# Patient Record
Sex: FEMALE | Race: WHITE | Employment: OTHER | URBAN - METROPOLITAN AREA
[De-identification: names, ages, dates, MRNs, and addresses within clinical notes are randomized per-mention and may not be internally consistent; named-entity substitution may affect disease eponyms.]

---

## 2017-05-06 PROBLEM — F33.2 SEVERE EPISODE OF RECURRENT MAJOR DEPRESSIVE DISORDER (HCC): Status: ACTIVE | Noted: 2017-05-06

## 2017-11-17 PROBLEM — K08.89 PAIN, DENTAL: Status: ACTIVE | Noted: 2017-11-17

## 2017-11-17 PROBLEM — R52 GENERALIZED PAIN: Status: ACTIVE | Noted: 2017-11-17

## 2018-01-04 PROBLEM — R73.09 ELEVATED GLUCOSE: Status: ACTIVE | Noted: 2018-01-04

## 2018-01-23 PROBLEM — E78.2 MIXED HYPERLIPIDEMIA: Status: ACTIVE | Noted: 2018-01-23

## 2018-03-15 ENCOUNTER — TELEPHONE (OUTPATIENT)
Dept: FAMILY MEDICINE CLINIC | Age: 38
End: 2018-03-15

## 2018-03-15 NOTE — TELEPHONE ENCOUNTER
Pt states she would like to go to the hospital to medically detox. She does not wish to go to a treatment center.

## 2018-05-02 ENCOUNTER — OFFICE VISIT (OUTPATIENT)
Dept: FAMILY MEDICINE CLINIC | Age: 38
End: 2018-05-02
Payer: COMMERCIAL

## 2018-05-02 VITALS
TEMPERATURE: 98.5 F | DIASTOLIC BLOOD PRESSURE: 86 MMHG | BODY MASS INDEX: 44.41 KG/M2 | WEIGHT: 293 LBS | SYSTOLIC BLOOD PRESSURE: 120 MMHG | OXYGEN SATURATION: 97 % | HEIGHT: 68 IN | HEART RATE: 107 BPM

## 2018-05-02 DIAGNOSIS — Z72.0 TOBACCO ABUSE: ICD-10-CM

## 2018-05-02 DIAGNOSIS — E66.01 MORBID OBESITY WITH BMI OF 40.0-44.9, ADULT (HCC): ICD-10-CM

## 2018-05-02 DIAGNOSIS — F10.10 ALCOHOL ABUSE: ICD-10-CM

## 2018-05-02 DIAGNOSIS — I49.3 PVC (PREMATURE VENTRICULAR CONTRACTION): ICD-10-CM

## 2018-05-02 DIAGNOSIS — R00.0 SINUS TACHYCARDIA: Primary | ICD-10-CM

## 2018-05-02 PROCEDURE — G8417 CALC BMI ABV UP PARAM F/U: HCPCS | Performed by: FAMILY MEDICINE

## 2018-05-02 PROCEDURE — 4004F PT TOBACCO SCREEN RCVD TLK: CPT | Performed by: FAMILY MEDICINE

## 2018-05-02 PROCEDURE — 99213 OFFICE O/P EST LOW 20 MIN: CPT | Performed by: FAMILY MEDICINE

## 2018-05-02 PROCEDURE — G8427 DOCREV CUR MEDS BY ELIG CLIN: HCPCS | Performed by: FAMILY MEDICINE

## 2018-05-02 RX ORDER — METOPROLOL TARTRATE 50 MG/1
TABLET, FILM COATED ORAL
Qty: 60 TABLET | Refills: 5 | Status: SHIPPED | OUTPATIENT
Start: 2018-05-02 | End: 2018-06-25 | Stop reason: SDUPTHER

## 2018-06-25 DIAGNOSIS — R00.0 SINUS TACHYCARDIA: ICD-10-CM

## 2018-06-25 DIAGNOSIS — I49.3 PVC (PREMATURE VENTRICULAR CONTRACTION): ICD-10-CM

## 2018-06-25 RX ORDER — METOPROLOL TARTRATE 50 MG/1
TABLET, FILM COATED ORAL
Qty: 60 TABLET | Refills: 0 | Status: SHIPPED | OUTPATIENT
Start: 2018-06-25 | End: 2018-07-21 | Stop reason: SDUPTHER

## 2018-06-28 ENCOUNTER — TELEPHONE (OUTPATIENT)
Dept: FAMILY MEDICINE CLINIC | Age: 38
End: 2018-06-28

## 2018-07-21 DIAGNOSIS — R00.0 SINUS TACHYCARDIA: ICD-10-CM

## 2018-07-21 DIAGNOSIS — I49.3 PVC (PREMATURE VENTRICULAR CONTRACTION): ICD-10-CM

## 2018-07-23 RX ORDER — METOPROLOL TARTRATE 50 MG/1
TABLET, FILM COATED ORAL
Qty: 60 TABLET | Refills: 3 | Status: SHIPPED | OUTPATIENT
Start: 2018-07-23 | End: 2018-11-14 | Stop reason: SDUPTHER

## 2018-08-10 ENCOUNTER — HOSPITAL ENCOUNTER (EMERGENCY)
Age: 38
Discharge: HOME | End: 2018-08-10
Attending: EMERGENCY MEDICINE

## 2018-08-10 VITALS
DIASTOLIC BLOOD PRESSURE: 92 MMHG | OXYGEN SATURATION: 98 % | BODY MASS INDEX: 44.41 KG/M2 | RESPIRATION RATE: 16 BRPM | WEIGHT: 293 LBS | HEART RATE: 101 BPM | HEIGHT: 68 IN | SYSTOLIC BLOOD PRESSURE: 138 MMHG | TEMPERATURE: 98.6 F

## 2018-08-10 DIAGNOSIS — H18.822 CORNEAL ABRASION DUE TO CONTACT LENS, LEFT: Primary | ICD-10-CM

## 2018-08-10 PROCEDURE — 6370000000 HC RX 637 (ALT 250 FOR IP): Performed by: NURSE PRACTITIONER

## 2018-08-10 PROCEDURE — 99282 EMERGENCY DEPT VISIT SF MDM: CPT

## 2018-08-10 RX ORDER — TETRACAINE HYDROCHLORIDE 5 MG/ML
2 SOLUTION OPHTHALMIC ONCE
Status: COMPLETED | OUTPATIENT
Start: 2018-08-10 | End: 2018-08-10

## 2018-08-10 RX ORDER — TOBRAMYCIN 3 MG/ML
1 SOLUTION/ DROPS OPHTHALMIC EVERY 4 HOURS
Qty: 1 BOTTLE | Refills: 0 | Status: SHIPPED | OUTPATIENT
Start: 2018-08-10 | End: 2018-08-20

## 2018-08-10 RX ORDER — NAPROXEN 500 MG/1
500 TABLET ORAL 2 TIMES DAILY PRN
Qty: 14 TABLET | Refills: 0 | Status: SHIPPED | OUTPATIENT
Start: 2018-08-10 | End: 2018-11-14

## 2018-08-10 RX ADMIN — TETRACAINE HYDROCHLORIDE 2 DROP: 5 SOLUTION OPHTHALMIC at 16:13

## 2018-08-10 RX ADMIN — FLUORESCEIN SODIUM 1 EACH: 0.6 STRIP OPHTHALMIC at 16:13

## 2018-08-10 ASSESSMENT — PAIN DESCRIPTION - FREQUENCY: FREQUENCY: CONTINUOUS

## 2018-08-10 ASSESSMENT — PAIN DESCRIPTION - LOCATION: LOCATION: EYE

## 2018-08-10 ASSESSMENT — PAIN DESCRIPTION - PAIN TYPE: TYPE: ACUTE PAIN

## 2018-08-10 ASSESSMENT — PAIN DESCRIPTION - DESCRIPTORS: DESCRIPTORS: OTHER (COMMENT)

## 2018-08-10 ASSESSMENT — PAIN DESCRIPTION - ORIENTATION: ORIENTATION: LEFT

## 2018-08-10 ASSESSMENT — PAIN SCALES - GENERAL: PAINLEVEL_OUTOF10: 6

## 2018-08-10 ASSESSMENT — VISUAL ACUITY
OU: 20/25
OS: 20/200
OD: 20/20

## 2018-08-10 NOTE — ED PROVIDER NOTES
Time: [id number] Dr. Walt Earl into examine patient. Consult(s):   None    Procedure(s):     SLIT LAMP EXAM:  Performed By: KORY Baer CNP and Dr. Walt Earl. Left Eye: Cornea: an abrasion is present which is approximately <1mm at the 1 o'clock position. Flourescein stain: Positive. Anterior chamber: no abnormalities were observed, no hyphema and no flair. MDM:   Corneal Abrasion and a contact lens wearer. Patient states her tetanus is up-to-date. She had a 10 point abrasion noted in the left eye which will be treated with tobramycin ophthalmic drops. Patient instructed to follow-up with her eye doctor in the next 24-48 hours if symptoms do not resolve. Patient instructed to keep contact lens out of the eyes for at least 10 days or until she is reevaluated by her ophthalmologist.      Counseling: The emergency provider has spoken with the patient and discussed todays results, in addition to providing specific details for the plan of care and counseling regarding the diagnosis and prognosis. Questions are answered at this time and they are agreeable with the plan. Assessment      1. Corneal abrasion due to contact lens, left      Plan   Discharge to home  Patient condition is good    New Medications     New Prescriptions    NAPROXEN (NAPROSYN) 500 MG TABLET    Take 1 tablet by mouth 2 times daily as needed for Pain (take with food and full glass of water.)    TOBRAMYCIN (TOBREX) 0.3 % OPHTHALMIC SOLUTION    Place 1 drop into the left eye every 4 hours for 10 days     Electronically signed by KORY Baer CNP   DD: 8/10/18  **This report was transcribed using voice recognition software. Every effort was made to ensure accuracy; however, inadvertent computerized transcription errors may be present.   END OF ED PROVIDER NOTE     KORY Baer CNP  08/10/18 8243

## 2018-08-13 ENCOUNTER — CARE COORDINATION (OUTPATIENT)
Dept: CARE COORDINATION | Age: 38
End: 2018-08-13

## 2018-08-13 NOTE — CARE COORDINATION
Ambulatory Care Coordination ED Follow up Call       Reason for ED Visit:  Eye problems  Care Management Risk Score: CMRS 5  How are you feeling? :     improved  Patient Reports the following:  Patient reports her eye feels much better. Using the eye drops as ordered. Declined to be transferred to Pre-Service due to she doesn't have insurance right now. Did you call your PCP prior to going to the ED? Yes          Post Discharge Status:  What health concerns since you left the Emergency Room? None    Do you have wounds that you are caring for at home? No    Do you have a follow up appt scheduled? no     Review of Instructions:                                 Do you have any questions regarding your discharge instructions?:  No  Medications:    What questions do you have about your medications? No  Are you taking your medications as directed? If not - why? Yes   Can you afford your medications? yes  ADLS:  Do you need assistance of any kind at home? no   What assistance is needed?  none      FU appts/Provider:    No future appointments. Health Maintenance Due   Topic Date Due    HIV screen  07/07/1996    Pneumococcal med risk (1 of 1 - PPSV23) 07/07/2000    Cervical cancer screen  07/07/2002     Patient advised to contact PCP office to have HM items/records faxed to PCP Office directly?   N/A

## 2018-09-13 ENCOUNTER — HOSPITAL ENCOUNTER (INPATIENT)
Age: 38
LOS: 4 days | Discharge: HOME | DRG: 885 | End: 2018-09-17
Attending: EMERGENCY MEDICINE | Admitting: PSYCHIATRY & NEUROLOGY

## 2018-09-13 DIAGNOSIS — F10.929 ALCOHOLIC INTOXICATION WITH COMPLICATION (HCC): Primary | ICD-10-CM

## 2018-09-13 PROBLEM — F32.A DEPRESSION: Status: ACTIVE | Noted: 2018-09-13

## 2018-09-13 LAB
ACETAMINOPHEN LEVEL: <5 MCG/ML (ref 10–30)
ALBUMIN SERPL-MCNC: 4.6 G/DL (ref 3.5–5.2)
ALP BLD-CCNC: 74 U/L (ref 35–104)
ALT SERPL-CCNC: 166 U/L (ref 0–32)
AMPHETAMINE SCREEN, URINE: NOT DETECTED
ANION GAP SERPL CALCULATED.3IONS-SCNC: 25 MMOL/L (ref 7–16)
AST SERPL-CCNC: 139 U/L (ref 0–31)
BACTERIA: ABNORMAL /HPF
BARBITURATE SCREEN URINE: NOT DETECTED
BASOPHILS ABSOLUTE: 0.05 E9/L (ref 0–0.2)
BASOPHILS RELATIVE PERCENT: 0.5 % (ref 0–2)
BENZODIAZEPINE SCREEN, URINE: NOT DETECTED
BILIRUB SERPL-MCNC: 0.5 MG/DL (ref 0–1.2)
BILIRUBIN URINE: NEGATIVE
BLOOD, URINE: NEGATIVE
BUN BLDV-MCNC: 6 MG/DL (ref 6–20)
CALCIUM SERPL-MCNC: 9.4 MG/DL (ref 8.6–10.2)
CANNABINOID SCREEN URINE: NOT DETECTED
CHLORIDE BLD-SCNC: 104 MMOL/L (ref 98–107)
CHP ED QC CHECK: YES
CLARITY: CLEAR
CO2: 19 MMOL/L (ref 22–29)
COCAINE METABOLITE SCREEN URINE: NOT DETECTED
COLOR: ABNORMAL
CREAT SERPL-MCNC: 0.8 MG/DL (ref 0.5–1)
EKG ATRIAL RATE: 102 BPM
EKG P AXIS: 55 DEGREES
EKG P-R INTERVAL: 182 MS
EKG Q-T INTERVAL: 366 MS
EKG QRS DURATION: 82 MS
EKG QTC CALCULATION (BAZETT): 477 MS
EKG R AXIS: 50 DEGREES
EKG T AXIS: 49 DEGREES
EKG VENTRICULAR RATE: 102 BPM
EOSINOPHILS ABSOLUTE: 0.15 E9/L (ref 0.05–0.5)
EOSINOPHILS RELATIVE PERCENT: 1.4 % (ref 0–6)
EPITHELIAL CELLS, UA: ABNORMAL /HPF
ETHANOL: 375 MG/DL (ref 0–0.08)
ETHANOL: 66 MG/DL (ref 0–0.08)
GFR AFRICAN AMERICAN: >60
GFR NON-AFRICAN AMERICAN: >60 ML/MIN/1.73
GLUCOSE BLD-MCNC: 119 MG/DL (ref 74–109)
GLUCOSE URINE: NEGATIVE MG/DL
HCT VFR BLD CALC: 51.3 % (ref 34–48)
HEMOGLOBIN: 17.4 G/DL (ref 11.5–15.5)
IMMATURE GRANULOCYTES #: 0.1 E9/L
IMMATURE GRANULOCYTES %: 0.9 % (ref 0–5)
KETONES, URINE: NEGATIVE MG/DL
LEUKOCYTE ESTERASE, URINE: NEGATIVE
LYMPHOCYTES ABSOLUTE: 3 E9/L (ref 1.5–4)
LYMPHOCYTES RELATIVE PERCENT: 28.1 % (ref 20–42)
MCH RBC QN AUTO: 32.3 PG (ref 26–35)
MCHC RBC AUTO-ENTMCNC: 33.9 % (ref 32–34.5)
MCV RBC AUTO: 95.4 FL (ref 80–99.9)
METHADONE SCREEN, URINE: NOT DETECTED
MONOCYTES ABSOLUTE: 0.56 E9/L (ref 0.1–0.95)
MONOCYTES RELATIVE PERCENT: 5.2 % (ref 2–12)
NEUTROPHILS ABSOLUTE: 6.81 E9/L (ref 1.8–7.3)
NEUTROPHILS RELATIVE PERCENT: 63.9 % (ref 43–80)
NITRITE, URINE: NEGATIVE
OPIATE SCREEN URINE: NOT DETECTED
PDW BLD-RTO: 13.8 FL (ref 11.5–15)
PH UA: 6.5 (ref 5–9)
PHENCYCLIDINE SCREEN URINE: NOT DETECTED
PLATELET # BLD: 244 E9/L (ref 130–450)
PMV BLD AUTO: 10 FL (ref 7–12)
POTASSIUM SERPL-SCNC: 3.3 MMOL/L (ref 3.5–5)
PREGNANCY TEST URINE, POC: NORMAL
PROPOXYPHENE SCREEN: NOT DETECTED
PROTEIN UA: NEGATIVE MG/DL
RBC # BLD: 5.38 E12/L (ref 3.5–5.5)
RBC UA: ABNORMAL /HPF (ref 0–2)
SALICYLATE, SERUM: <0.3 MG/DL (ref 0–30)
SODIUM BLD-SCNC: 148 MMOL/L (ref 132–146)
SPECIFIC GRAVITY UA: <=1.005 (ref 1–1.03)
TOTAL PROTEIN: 7.9 G/DL (ref 6.4–8.3)
TRICYCLIC ANTIDEPRESSANTS SCREEN SERUM: NEGATIVE NG/ML
TSH SERPL DL<=0.05 MIU/L-ACNC: 1.16 UIU/ML (ref 0.27–4.2)
UROBILINOGEN, URINE: 0.2 E.U./DL
WBC # BLD: 10.7 E9/L (ref 4.5–11.5)
WBC UA: ABNORMAL /HPF (ref 0–5)

## 2018-09-13 PROCEDURE — 6370000000 HC RX 637 (ALT 250 FOR IP): Performed by: PHYSICIAN ASSISTANT

## 2018-09-13 PROCEDURE — 6370000000 HC RX 637 (ALT 250 FOR IP): Performed by: EMERGENCY MEDICINE

## 2018-09-13 PROCEDURE — 93005 ELECTROCARDIOGRAM TRACING: CPT | Performed by: PHYSICIAN ASSISTANT

## 2018-09-13 PROCEDURE — 6370000000 HC RX 637 (ALT 250 FOR IP): Performed by: NURSE PRACTITIONER

## 2018-09-13 PROCEDURE — 36415 COLL VENOUS BLD VENIPUNCTURE: CPT

## 2018-09-13 PROCEDURE — G0480 DRUG TEST DEF 1-7 CLASSES: HCPCS

## 2018-09-13 PROCEDURE — 80307 DRUG TEST PRSMV CHEM ANLYZR: CPT

## 2018-09-13 PROCEDURE — 81001 URINALYSIS AUTO W/SCOPE: CPT

## 2018-09-13 PROCEDURE — 84443 ASSAY THYROID STIM HORMONE: CPT

## 2018-09-13 PROCEDURE — 1240000000 HC EMOTIONAL WELLNESS R&B

## 2018-09-13 PROCEDURE — 80053 COMPREHEN METABOLIC PANEL: CPT

## 2018-09-13 PROCEDURE — 85025 COMPLETE CBC W/AUTO DIFF WBC: CPT

## 2018-09-13 PROCEDURE — 99284 EMERGENCY DEPT VISIT MOD MDM: CPT

## 2018-09-13 RX ORDER — CLONAZEPAM 1 MG/1
1 TABLET ORAL 2 TIMES DAILY PRN
Status: ON HOLD | COMMUNITY
End: 2018-09-17 | Stop reason: HOSPADM

## 2018-09-13 RX ORDER — MULTIVITAMIN WITH FOLIC ACID 400 MCG
1 TABLET ORAL DAILY
Status: DISCONTINUED | OUTPATIENT
Start: 2018-09-13 | End: 2018-09-17 | Stop reason: HOSPADM

## 2018-09-13 RX ORDER — METOPROLOL TARTRATE 50 MG/1
50 TABLET, FILM COATED ORAL ONCE
Status: COMPLETED | OUTPATIENT
Start: 2018-09-13 | End: 2018-09-13

## 2018-09-13 RX ORDER — PRAZOSIN HYDROCHLORIDE 1 MG/1
1 CAPSULE ORAL NIGHTLY
COMMUNITY
End: 2018-11-14 | Stop reason: ALTCHOICE

## 2018-09-13 RX ORDER — LORAZEPAM 2 MG/ML
1 INJECTION INTRAMUSCULAR
Status: DISCONTINUED | OUTPATIENT
Start: 2018-09-13 | End: 2018-09-17 | Stop reason: HOSPADM

## 2018-09-13 RX ORDER — FOLIC ACID 1 MG/1
1 TABLET ORAL DAILY
Status: DISCONTINUED | OUTPATIENT
Start: 2018-09-13 | End: 2018-09-17 | Stop reason: HOSPADM

## 2018-09-13 RX ORDER — HALOPERIDOL 5 MG/ML
10 INJECTION INTRAMUSCULAR EVERY 6 HOURS PRN
Status: DISCONTINUED | OUTPATIENT
Start: 2018-09-13 | End: 2018-09-17 | Stop reason: HOSPADM

## 2018-09-13 RX ORDER — ALPRAZOLAM 0.5 MG/1
0.5 TABLET ORAL 3 TIMES DAILY PRN
Status: ON HOLD | COMMUNITY
End: 2018-09-17 | Stop reason: HOSPADM

## 2018-09-13 RX ORDER — BENZTROPINE MESYLATE 1 MG/ML
2 INJECTION INTRAMUSCULAR; INTRAVENOUS 2 TIMES DAILY PRN
Status: DISCONTINUED | OUTPATIENT
Start: 2018-09-13 | End: 2018-09-17 | Stop reason: HOSPADM

## 2018-09-13 RX ORDER — LORAZEPAM 1 MG/1
4 TABLET ORAL
Status: DISCONTINUED | OUTPATIENT
Start: 2018-09-13 | End: 2018-09-17 | Stop reason: HOSPADM

## 2018-09-13 RX ORDER — MAGNESIUM HYDROXIDE/ALUMINUM HYDROXICE/SIMETHICONE 120; 1200; 1200 MG/30ML; MG/30ML; MG/30ML
30 SUSPENSION ORAL PRN
Status: DISCONTINUED | OUTPATIENT
Start: 2018-09-13 | End: 2018-09-17 | Stop reason: HOSPADM

## 2018-09-13 RX ORDER — HYDROXYZINE HYDROCHLORIDE 10 MG/1
10 TABLET, FILM COATED ORAL 2 TIMES DAILY
Status: ON HOLD | COMMUNITY
End: 2018-09-17 | Stop reason: HOSPADM

## 2018-09-13 RX ORDER — LORAZEPAM 2 MG/ML
2 INJECTION INTRAMUSCULAR
Status: DISCONTINUED | OUTPATIENT
Start: 2018-09-13 | End: 2018-09-17 | Stop reason: HOSPADM

## 2018-09-13 RX ORDER — NICOTINE 21 MG/24HR
1 PATCH, TRANSDERMAL 24 HOURS TRANSDERMAL DAILY
Status: DISCONTINUED | OUTPATIENT
Start: 2018-09-13 | End: 2018-09-17 | Stop reason: HOSPADM

## 2018-09-13 RX ORDER — PROPRANOLOL HYDROCHLORIDE 20 MG/1
20 TABLET ORAL 2 TIMES DAILY
Status: ON HOLD | COMMUNITY
End: 2018-09-16 | Stop reason: HOSPADM

## 2018-09-13 RX ORDER — LORAZEPAM 2 MG/ML
4 INJECTION INTRAMUSCULAR
Status: DISCONTINUED | OUTPATIENT
Start: 2018-09-13 | End: 2018-09-17 | Stop reason: HOSPADM

## 2018-09-13 RX ORDER — HYDROXYZINE PAMOATE 50 MG/1
50 CAPSULE ORAL EVERY 6 HOURS PRN
Status: DISCONTINUED | OUTPATIENT
Start: 2018-09-13 | End: 2018-09-17 | Stop reason: HOSPADM

## 2018-09-13 RX ORDER — TRAZODONE HYDROCHLORIDE 50 MG/1
50 TABLET ORAL NIGHTLY PRN
Status: DISCONTINUED | OUTPATIENT
Start: 2018-09-13 | End: 2018-09-17 | Stop reason: HOSPADM

## 2018-09-13 RX ORDER — LORAZEPAM 1 MG/1
2 TABLET ORAL
Status: DISCONTINUED | OUTPATIENT
Start: 2018-09-13 | End: 2018-09-17 | Stop reason: HOSPADM

## 2018-09-13 RX ORDER — ACETAMINOPHEN 325 MG/1
650 TABLET ORAL EVERY 4 HOURS PRN
Status: DISCONTINUED | OUTPATIENT
Start: 2018-09-13 | End: 2018-09-15

## 2018-09-13 RX ORDER — LORAZEPAM 1 MG/1
3 TABLET ORAL
Status: DISCONTINUED | OUTPATIENT
Start: 2018-09-13 | End: 2018-09-17 | Stop reason: HOSPADM

## 2018-09-13 RX ORDER — POTASSIUM CHLORIDE 20 MEQ/1
40 TABLET, EXTENDED RELEASE ORAL ONCE
Status: COMPLETED | OUTPATIENT
Start: 2018-09-13 | End: 2018-09-13

## 2018-09-13 RX ORDER — THIAMINE MONONITRATE (VIT B1) 100 MG
100 TABLET ORAL DAILY
Status: DISCONTINUED | OUTPATIENT
Start: 2018-09-13 | End: 2018-09-17 | Stop reason: HOSPADM

## 2018-09-13 RX ORDER — LORAZEPAM 2 MG/ML
3 INJECTION INTRAMUSCULAR
Status: DISCONTINUED | OUTPATIENT
Start: 2018-09-13 | End: 2018-09-17 | Stop reason: HOSPADM

## 2018-09-13 RX ORDER — OLANZAPINE 10 MG/1
10 TABLET ORAL
Status: ACTIVE | OUTPATIENT
Start: 2018-09-13 | End: 2018-09-13

## 2018-09-13 RX ADMIN — MULTIVITAMIN TABLET 1 TABLET: TABLET at 17:37

## 2018-09-13 RX ADMIN — POTASSIUM CHLORIDE 40 MEQ: 20 TABLET, EXTENDED RELEASE ORAL at 02:48

## 2018-09-13 RX ADMIN — FOLIC ACID 1 MG: 1 TABLET ORAL at 17:37

## 2018-09-13 RX ADMIN — METOPROLOL TARTRATE 50 MG: 50 TABLET ORAL at 13:15

## 2018-09-13 RX ADMIN — TRAZODONE HYDROCHLORIDE 50 MG: 50 TABLET ORAL at 21:04

## 2018-09-13 RX ADMIN — HYDROXYZINE PAMOATE 50 MG: 50 CAPSULE ORAL at 21:03

## 2018-09-13 RX ADMIN — THIAMINE HCL TAB 100 MG 100 MG: 100 TAB at 17:37

## 2018-09-13 ASSESSMENT — SLEEP AND FATIGUE QUESTIONNAIRES
DO YOU HAVE DIFFICULTY SLEEPING: YES
DIFFICULTY STAYING ASLEEP: YES
SLEEP PATTERN: DIFFICULTY FALLING ASLEEP;NIGHTMARES/TERRORS;DISTURBED/INTERRUPTED SLEEP
DIFFICULTY FALLING ASLEEP: YES
AVERAGE NUMBER OF SLEEP HOURS: 6
DO YOU USE A SLEEP AID: YES
RESTFUL SLEEP: NO
DIFFICULTY ARISING: YES

## 2018-09-13 ASSESSMENT — PAIN SCALES - GENERAL: PAINLEVEL_OUTOF10: 0

## 2018-09-13 ASSESSMENT — LIFESTYLE VARIABLES: HISTORY_ALCOHOL_USE: YES

## 2018-09-13 NOTE — ED PROVIDER NOTES
that she drinks alcohol. She reports that she does not use drugs. Family History: family history includes Breast Cancer (age of onset: 22) in her maternal aunt; COPD in her maternal grandfather; Cancer (age of onset: 36) in her maternal aunt; Cancer (age of onset: 61) in her father; Heart Attack (age of onset: 48) in her paternal grandfather; Heart Attack (age of onset: 54) in her father. The patients home medications have been reviewed. Allergies: Patient has no known allergies. ---------------------------------------------------PHYSICAL EXAM--------------------------------------    Constitutional/General: Alert and oriented to person and place, anxious on exam, otherwise stable appearing, non toxic in NAD. Obvious smell of alcohol on her breath. Head: NC/AT  Eyes: PERRL, EOMI  Ears and Nose: No discharge or bleeding  Mouth: Oropharynx clear, mucosa moist, handling secretions, no trismus  Neck: Supple, full ROM, non tender to palpation in the midline, non tender in midline posteriorly. Pulmonary: Lungs clear to auscultation bilaterally, no obvious wheezes or rhonchi noted. Not in respiratory distress  Cardiovascular:  Regular rate. Regular rhythm. No obvious murmur noted. 2+ distal pulses  Chest: no chest wall tenderness  Abdomen: Soft. Obese,  Non tender. Non distended. +BS. No rebound or guarding. Musculoskeletal: Moves all extremities x 4. Warm and well perfused, no clubbing, cyanosis, or edema. Capillary refill <3 seconds  Skin: warm and dry. No rashes. Neurologic: GCS 15, CN 2-12 grossly intact, no focal deficits, symmetric strength 5/5 in the upper and lower extremities bilaterally  Psych: Normal Affect    -------------------------------------------------- RESULTS -------------------------------------------------  I have personally reviewed all laboratory and imaging results for this patient. Results are listed below.      LABS:  Results for orders placed or performed during the hospital encounter of 09/13/18   CBC Auto Differential   Result Value Ref Range    WBC 10.7 4.5 - 11.5 E9/L    RBC 5.38 3.50 - 5.50 E12/L    Hemoglobin 17.4 (H) 11.5 - 15.5 g/dL    Hematocrit 51.3 (H) 34.0 - 48.0 %    MCV 95.4 80.0 - 99.9 fL    MCH 32.3 26.0 - 35.0 pg    MCHC 33.9 32.0 - 34.5 %    RDW 13.8 11.5 - 15.0 fL    Platelets 784 757 - 124 E9/L    MPV 10.0 7.0 - 12.0 fL    Neutrophils % 63.9 43.0 - 80.0 %    Immature Granulocytes % 0.9 0.0 - 5.0 %    Lymphocytes % 28.1 20.0 - 42.0 %    Monocytes % 5.2 2.0 - 12.0 %    Eosinophils % 1.4 0.0 - 6.0 %    Basophils % 0.5 0.0 - 2.0 %    Neutrophils # 6.81 1.80 - 7.30 E9/L    Immature Granulocytes # 0.10 E9/L    Lymphocytes # 3.00 1.50 - 4.00 E9/L    Monocytes # 0.56 0.10 - 0.95 E9/L    Eosinophils # 0.15 0.05 - 0.50 E9/L    Basophils # 0.05 0.00 - 0.20 E9/L   Comprehensive Metabolic Panel   Result Value Ref Range    Sodium 148 (H) 132 - 146 mmol/L    Potassium 3.3 (L) 3.5 - 5.0 mmol/L    Chloride 104 98 - 107 mmol/L    CO2 19 (L) 22 - 29 mmol/L    Anion Gap 25 (H) 7 - 16 mmol/L    Glucose 119 (H) 74 - 109 mg/dL    BUN 6 6 - 20 mg/dL    CREATININE 0.8 0.5 - 1.0 mg/dL    GFR Non-African American >60 >=60 mL/min/1.73    GFR African American >60     Calcium 9.4 8.6 - 10.2 mg/dL    Total Protein 7.9 6.4 - 8.3 g/dL    Alb 4.6 3.5 - 5.2 g/dL    Total Bilirubin 0.5 0.0 - 1.2 mg/dL    Alkaline Phosphatase 74 35 - 104 U/L     (H) 0 - 32 U/L     (H) 0 - 31 U/L   Urinalysis with Microscopic   Result Value Ref Range    Color, UA Straw Straw/Yellow    Clarity, UA Clear Clear    Glucose, Ur Negative Negative mg/dL    Bilirubin Urine Negative Negative    Ketones, Urine Negative Negative mg/dL    Specific Gravity, UA <=1.005 1.005 - 1.030    Blood, Urine Negative Negative    pH, UA 6.5 5.0 - 9.0    Protein, UA Negative Negative mg/dL    Urobilinogen, Urine 0.2 <2.0 E.U./dL    Nitrite, Urine Negative Negative    Leukocyte Esterase, Urine Negative Negative    WBC, UA 45.61 kg/m²   Oxygen Saturation Interpretation: Normal    The patients available past medical records and past encounters were reviewed. ------------------------------ ED COURSE/MEDICAL DECISION MAKING----------------------  Medications   potassium chloride (KLOR-CON M) extended release tablet 40 mEq (not administered)         Medical Decision Making:    Patient to ER, pink slipped by [facility] police for apparent ? Homicidal ideation. Re-Evaluations:             Re-evaluation. Patients symptoms stable  Patient labs, urine and EKG noted. Alcohol level 375. Patient cooperative. Pink slip in chart. Will observe. Dose of oral Potassium ordered. Consultations:             Social work to see patient once alcohol level decreases. This patient's ED course included: a personal history and physicial examination, re-evaluation prior to disposition, multiple bedside re-evaluations and complex medical decision making and emergency management    This patient has remained hemodynamically stable during their ED course. Counseling: The emergency provider has spoken with the patient and discussed todays results, in addition to providing specific details for the plan of care and counseling regarding the diagnosis and prognosis. Questions are answered at this time and they are agreeable with the plan.     --------------------------------- IMPRESSION AND DISPOSITION ---------------------------------    IMPRESSION  1.  Alcoholic intoxication with complication ([facility] [address]        DISPOSITION  Disposition: as per social work  Patient condition is stable    Patient seen and discussed with Dr. Rod Jefferson, PACatherineC  09/13/18 [id number]
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
## 2018-09-13 NOTE — ED NOTES
Patient out of room and ambulatory to phone after mother called nurses station.  This rn gave patient phone and then patient proceeded to say she needed to call her \"[place]\" patient offered breakfast she stated that she is fat she is always hungry but she doesn't want to eat     Sarah Bloom RN  09/13/18 [id number]

## 2018-09-13 NOTE — ED NOTES
Pt states \"I feel suicidal, but i'm not suicidal\".  Pt states she has thoughts of killing herself, but would never act on them due to her two children     Ramo Lal RN  09/13/18 [id number]
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
## 2018-09-13 NOTE — ED NOTES
Patient remains sleeping and resting comfortably.  No acute distress noted       Josiane Panchal RN  09/13/18 8235

## 2018-09-13 NOTE — ED NOTES
Pt awake at this time. requesting metoprolol. This RN explained to pt that she cannot have BP meds at this time d/t BP the patient. PT states her heart is racing, VSS at this time.  PT offered PO fluids but declined     Lisbeth Henning, [address] [place] [address]  09/13/18 3024

## 2018-09-14 PROBLEM — F33.2 MAJOR DEPRESSIVE DISORDER, RECURRENT, SEVERE W/O PSYCHOTIC BEHAVIOR (HCC): Status: ACTIVE | Noted: 2018-09-14

## 2018-09-14 PROBLEM — E87.29 HIGH ANION GAP METABOLIC ACIDOSIS: Status: RESOLVED | Noted: 2018-09-14 | Resolved: 2018-09-14

## 2018-09-14 PROBLEM — E87.6 HYPOKALEMIA: Status: ACTIVE | Noted: 2018-09-14

## 2018-09-14 PROBLEM — E87.29 HIGH ANION GAP METABOLIC ACIDOSIS: Status: ACTIVE | Noted: 2018-09-14

## 2018-09-14 PROBLEM — E87.0 HYPERNATREMIA: Status: ACTIVE | Noted: 2018-09-14

## 2018-09-14 PROBLEM — I10 HTN (HYPERTENSION): Status: ACTIVE | Noted: 2018-09-14

## 2018-09-14 PROBLEM — E87.0 HYPERNATREMIA: Status: RESOLVED | Noted: 2018-09-14 | Resolved: 2018-09-14

## 2018-09-14 LAB
ALBUMIN SERPL-MCNC: 3.9 G/DL (ref 3.5–5.2)
ALP BLD-CCNC: 60 U/L (ref 35–104)
ALT SERPL-CCNC: 105 U/L (ref 0–32)
ANION GAP SERPL CALCULATED.3IONS-SCNC: 15 MMOL/L (ref 7–16)
AST SERPL-CCNC: 62 U/L (ref 0–31)
BILIRUB SERPL-MCNC: 1 MG/DL (ref 0–1.2)
BUN BLDV-MCNC: 8 MG/DL (ref 6–20)
CALCIUM SERPL-MCNC: 8.9 MG/DL (ref 8.6–10.2)
CHLORIDE BLD-SCNC: 104 MMOL/L (ref 98–107)
CHOLESTEROL, TOTAL: 169 MG/DL (ref 0–199)
CO2: 24 MMOL/L (ref 22–29)
CREAT SERPL-MCNC: 0.8 MG/DL (ref 0.5–1)
GFR AFRICAN AMERICAN: >60
GFR NON-AFRICAN AMERICAN: >60 ML/MIN/1.73
GLUCOSE BLD-MCNC: 88 MG/DL (ref 74–109)
HBA1C MFR BLD: 5.5 % (ref 4–5.6)
HDLC SERPL-MCNC: 56 MG/DL
LDL CHOLESTEROL CALCULATED: 93 MG/DL (ref 0–99)
MAGNESIUM: 2 MG/DL (ref 1.6–2.6)
POTASSIUM REFLEX MAGNESIUM: 3.4 MMOL/L (ref 3.5–5)
SODIUM BLD-SCNC: 143 MMOL/L (ref 132–146)
TOTAL PROTEIN: 6.3 G/DL (ref 6.4–8.3)
TRIGL SERPL-MCNC: 100 MG/DL (ref 0–149)
VLDLC SERPL CALC-MCNC: 20 MG/DL

## 2018-09-14 PROCEDURE — 1240000000 HC EMOTIONAL WELLNESS R&B

## 2018-09-14 PROCEDURE — 80061 LIPID PANEL: CPT

## 2018-09-14 PROCEDURE — 6370000000 HC RX 637 (ALT 250 FOR IP): Performed by: PSYCHIATRY & NEUROLOGY

## 2018-09-14 PROCEDURE — 6370000000 HC RX 637 (ALT 250 FOR IP): Performed by: CLINICAL NURSE SPECIALIST

## 2018-09-14 PROCEDURE — 83735 ASSAY OF MAGNESIUM: CPT

## 2018-09-14 PROCEDURE — 6370000000 HC RX 637 (ALT 250 FOR IP): Performed by: FAMILY MEDICINE

## 2018-09-14 PROCEDURE — 83036 HEMOGLOBIN GLYCOSYLATED A1C: CPT

## 2018-09-14 PROCEDURE — 80053 COMPREHEN METABOLIC PANEL: CPT

## 2018-09-14 PROCEDURE — 6370000000 HC RX 637 (ALT 250 FOR IP): Performed by: EMERGENCY MEDICINE

## 2018-09-14 PROCEDURE — 99222 1ST HOSP IP/OBS MODERATE 55: CPT | Performed by: PSYCHIATRY & NEUROLOGY

## 2018-09-14 PROCEDURE — 36415 COLL VENOUS BLD VENIPUNCTURE: CPT

## 2018-09-14 PROCEDURE — 6370000000 HC RX 637 (ALT 250 FOR IP): Performed by: NURSE PRACTITIONER

## 2018-09-14 RX ORDER — DIVALPROEX SODIUM 250 MG/1
250 TABLET, DELAYED RELEASE ORAL 2 TIMES DAILY
Status: DISCONTINUED | OUTPATIENT
Start: 2018-09-14 | End: 2018-09-15

## 2018-09-14 RX ORDER — POTASSIUM CHLORIDE 20 MEQ/1
40 TABLET, EXTENDED RELEASE ORAL ONCE
Status: COMPLETED | OUTPATIENT
Start: 2018-09-14 | End: 2018-09-14

## 2018-09-14 RX ORDER — PRAZOSIN HYDROCHLORIDE 1 MG/1
1 CAPSULE ORAL NIGHTLY
Status: DISCONTINUED | OUTPATIENT
Start: 2018-09-14 | End: 2018-09-17 | Stop reason: HOSPADM

## 2018-09-14 RX ORDER — METOPROLOL TARTRATE 50 MG/1
50 TABLET, FILM COATED ORAL 2 TIMES DAILY
Status: DISCONTINUED | OUTPATIENT
Start: 2018-09-14 | End: 2018-09-17 | Stop reason: HOSPADM

## 2018-09-14 RX ADMIN — THIAMINE HCL TAB 100 MG 100 MG: 100 TAB at 09:02

## 2018-09-14 RX ADMIN — DIVALPROEX SODIUM 250 MG: 250 TABLET, DELAYED RELEASE ORAL at 20:05

## 2018-09-14 RX ADMIN — PRAZOSIN HYDROCHLORIDE 1 MG: 1 CAPSULE ORAL at 21:36

## 2018-09-14 RX ADMIN — MULTIVITAMIN TABLET 1 TABLET: TABLET at 09:02

## 2018-09-14 RX ADMIN — DIVALPROEX SODIUM 250 MG: 250 TABLET, DELAYED RELEASE ORAL at 13:18

## 2018-09-14 RX ADMIN — METOPROLOL TARTRATE 50 MG: 50 TABLET, FILM COATED ORAL at 09:02

## 2018-09-14 RX ADMIN — FOLIC ACID 1 MG: 1 TABLET ORAL at 09:02

## 2018-09-14 RX ADMIN — METOPROLOL TARTRATE 50 MG: 50 TABLET, FILM COATED ORAL at 01:41

## 2018-09-14 RX ADMIN — POTASSIUM CHLORIDE 40 MEQ: 20 TABLET, EXTENDED RELEASE ORAL at 17:32

## 2018-09-14 RX ADMIN — METOPROLOL TARTRATE 50 MG: 50 TABLET, FILM COATED ORAL at 20:05

## 2018-09-14 RX ADMIN — LORAZEPAM 2 MG: 1 TABLET ORAL at 00:55

## 2018-09-14 RX ADMIN — TRAZODONE HYDROCHLORIDE 50 MG: 50 TABLET ORAL at 21:36

## 2018-09-14 ASSESSMENT — SLEEP AND FATIGUE QUESTIONNAIRES
DO YOU HAVE DIFFICULTY SLEEPING: YES
DIFFICULTY FALLING ASLEEP: YES
AVERAGE NUMBER OF SLEEP HOURS: 6
DIFFICULTY STAYING ASLEEP: YES
SLEEP PATTERN: DIFFICULTY FALLING ASLEEP;DISTURBED/INTERRUPTED SLEEP;NIGHTMARES/TERRORS
RESTFUL SLEEP: NO
DO YOU USE A SLEEP AID: YES
DIFFICULTY ARISING: YES

## 2018-09-14 ASSESSMENT — PAIN SCALES - GENERAL: PAINLEVEL_OUTOF10: 0

## 2018-09-14 ASSESSMENT — LIFESTYLE VARIABLES: HISTORY_ALCOHOL_USE: YES

## 2018-09-14 NOTE — PLAN OF CARE
Problem: Anger Management/Homicidal Ideation:  Goal: Absence of homicidal ideation  Absence of homicidal ideation   Outcome: Met This Shift  PT. DENIES ACTIVE HOMICIDAL IDEATIONS. Problem: Altered Mood, Depressive Behavior:  Goal: Ability to disclose and discuss suicidal ideas will improve  Ability to disclose and discuss suicidal ideas will improve   Outcome: Met This Shift  PT. DENIED SUICIDAL IDEATIONS ON A.M. ASSESSMENT.

## 2018-09-14 NOTE — PROGRESS NOTES
PT. HAS BEEN UP UNIT. REPORTS MILD WITHDRAWAL SYMPTOMS. PLEASANT AND IN GOOD CONTROL. DENIES SUICIDAL IDEATION. DENIES HOMICIDAL IDEATION. ATTENDS GROUPS. MEDICATION COMPLIANT.
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
## 2018-09-14 NOTE — PLAN OF CARE
[facility]  Initial Interdisciplinary Treatment Plan NOTE    Review Date & Time: 9/14/18 1130    Patient was in treatment team    Admission Type:   Admission Type: Involuntary    Reason for admission:  Reason for Admission: \"I got pink slipped. \" pr pt.       Estimated Length of Stay Update:   3 to 5 days  Estimated Discharge Date Update:  Monday    PATIENT STRENGTHS:  Patient Strengths Strengths: Communication, Positive Support, Connection to output provider, Employment, Social Skills, Medication Compliance  Patient Strengths and Limitations:Limitations: Tendency to isolate self, Inappropriate/potentially harmful leisure interests  Addictive Behavior:Addictive Behavior  In the past 3 months, have you felt or has someone told you that you have a problem with:  : Eating (too much/too little)  Do you have a history of Chemical Use?: No  Do you have a history of Alcohol Use?: Yes  Do you have a history of Street Drug Abuse?: No  Histroy of Prescripton Drug Abuse?: No  Medical Problems:  Past Medical History:   Diagnosis Date    [facility] alcohol intoxication ([facility] [address]     Acute bronchitis     Alcohol abuse     Anxiety     Anxiety and depression     Benzodiazepine abuse     Breast lump in female     Right    Corneal abrasion     Depression     Elevated glucose level 11/3/2016    Elevated liver enzymes     Fatty liver 11/23/2016    From ultrasound of 11/23/16    Gastroenteritis     GERD (gastroesophageal reflux disease) 2010    Heart palpitations     Hypokalemia     Migraine     Miscarriage 2012    Morbid obesity with BMI of 40.0-44.9, adult ([address]     MRSA infection of labia 2011    MVP (mitral valve prolapse) 2004    per DILIA    PCOS (polycystic ovarian syndrome) 2008    Pilonidal cyst     Sinus tachycardia     Tobacco abuse        EDUCATION:   Learner Progress Toward Treatment Goals: Reviewed results and recommendations of this team    Method: Small group    Outcome: Verbalized

## 2018-09-14 NOTE — CONSULTS
found to be negative for contributing factors    REVIEW OF SYSTEMS:     Pertinent positives as noted in the HPI. All other systems reviewed and negative. PHYSICAL EXAM:  BP (!) 133/92   Pulse 84   Temp 98.5 °F (36.9 °C) (Oral)   Resp 18   Ht 5' 8\" (1.727 m)   Wt 300 lb (136.1 kg)   SpO2 96%   BMI 45.61 kg/m²   General appearance: No apparent distress, appears stated age and cooperative. obese  HEENT: Normal cephalic, atraumatic without obvious deformity. Pupils equal, round, and reactive to light. Extra ocular muscles intact. Conjunctivae/corneas clear. Neck: Supple, with full range of motion. No jugular venous distention. Trachea midline. Respiratory:  Normal respiratory effort. Clear to auscultation, bilaterally without Rales/Wheezes/Rhonchi. Cardiovascular: Regular rate and rhythm with normal S1/S2 without murmurs, rubs or gallops. Brisk capillary refill. 2+ lower extremity pulses (dorsalis pedis). Abdomen: Soft, non-tender, non-distended with normal bowel sounds. Musculoskeletal: No clubbing, cyanosis or edema bilaterally. Full range of motion without deformity. Skin: Normal skin color. No rashes or lesions. Neurologic:  Neurovascularly intact without any focal sensory/motor deficits. Psychiatric: Alert and oriented, thought content appropriate, normal insight      Labs:     Recent Labs      09/13/18 0115   WBC  10.7   HGB  17.4*   HCT  51.3*   PLT  244     Recent Labs      09/13/18 0115 09/14/18   0732   NA  148*  143   K  3.3*  3.4*   CL  104  104   CO2  19*  24   BUN  6  8   CREATININE  0.8  0.8   CALCIUM  9.4  8.9     Recent Labs      09/13/18 0115 09/14/18   0732   AST  139*  62*   ALT  166*  105*   BILITOT  0.5  1.0   ALKPHOS  74  60     No results for input(s): INR in the last 72 hours. No results for input(s): Aislinn Doll in the last 72 hours.     Urinalysis:      Lab Results   Component Value Date    NITRU Negative 09/13/2018    WBCUA 0-1 09/13/2018    WBCUA NONE above.       Assessment/Plan  Active Hospital Problems    Diagnosis    HTN (hypertension) [I10]    Major depressive disorder, recurrent, severe w/o psychotic behavior ([facility] [address] [F33.2]    Hypokalemia [E87.6]    Depression [F32.9]    Elevated LFTs [R79.89]    Morbid obesity with BMI of 40.0-44.9, adult (HCC) [E66.01, Z68.41]    Alcohol abuse [F10.10]     HTN  - resume home medications  - monitor BP    Hypernatremia  - monitor labs  - resolved    Hypokalemia  - monitor labs  - supplement PRN    Morbid obesity  - lifestyle modifications    Elevated LFTs  - alcoholic  - fatty liver  - monitor labs    Alcohol abuse  - CIWA protocol    High anion gap metabolic acidosis  - 25 on admission  - resolved    Depression  - management per primary team    Karen Manuel D.O. Chief Hospitalist - [facility]  Please contact me via [employer]    NOTE: This report was transcribed using voice recognition software. Every effort was made to ensure accuracy; however, inadvertent computerized transcription errors may be present.

## 2018-09-14 NOTE — CONSULTS
Es   Seterg    TRANSESOPHAGEAL ECHOCARDIOGRAM  2004    showed MVP         Current Facility-Administered Medications:     metoprolol tartrate (LOPRESSOR) tablet 50 mg, 50 mg, Oral, BID, Mary Jo Moore MD, 50 mg at 09/14/18 0902    prazosin (MINIPRESS) capsule 1 mg, 1 mg, Oral, Nightly, Shirley Alvarez MD    divalproex (DEPAKOTE) DR tablet 250 mg, 250 mg, Oral, BID, Shirley Alvarez MD, 250 mg at 09/14/18 1318    potassium chloride (KLOR-CON M) extended release tablet 40 mEq, 40 mEq, Oral, Once, Charles Triana APRN - CNS    nicotine (NICODERM CQ) 21 MG/24HR 1 patch, 1 patch, Transdermal, Daily, Riley Breeding, DO, 1 patch at 09/14/18 0900    acetaminophen (TYLENOL) tablet 650 mg, 650 mg, Oral, Q4H PRN, [facility], APRN - CNP    hydrOXYzine (VISTARIL) capsule 50 mg, 50 mg, Oral, Q6H PRN, [facility], APRN - CNP, 50 mg at 09/13/18 2103    haloperidol lactate (HALDOL) injection 10 mg, 10 mg, Intramuscular, Q6H PRN, [facility], APRN - CNP    traZODone (DESYREL) tablet 50 mg, 50 mg, Oral, Nightly PRN, [facility] - CNP, 50 mg at 09/13/18 2104    benztropine mesylate (COGENTIN) injection 2 mg, 2 mg, Intramuscular, BID PRN, [facility], APRN - CNP    magnesium hydroxide (MILK OF MAGNESIA) 400 MG/5ML suspension 30 mL, 30 mL, Oral, Daily PRN, [facility], APRN - CNP    aluminum & magnesium hydroxide-simethicone (MAALOX) 200-200-20 MG/5ML suspension 30 mL, 30 mL, Oral, PRN, [facility], APRN - CNP    folic acid (FOLVITE) tablet 1 mg, 1 mg, Oral, Daily, [facility], APRN - CNP, 1 mg at 09/14/18 0902    multivitamin 1 tablet, 1 tablet, Oral, Daily, [facility], APRN - CNP, 1 tablet at 09/14/18 0902    vitamin B-1 (THIAMINE) tablet 100 mg, 100 mg, Oral, Daily, Monae Álvarez, APRN - CNP, 100 mg at 09/14/18 0902    LORazepam (ATIVAN) injection 1 mg, 1 mg, Intramuscular, Q1H PRN **OR** LORazepam (ATIVAN) tablet 2 mg, 2 mg, Oral, Q1H PRN, 2 mg at 09/14/18 0055 **OR** LORazepam (ATIVAN) injection 2 mg, 2 mg, Intramuscular, Q1H PRN **OR** LORazepam (ATIVAN) tablet 3 mg, 3 mg, Oral, Q1H PRN **OR** LORazepam (ATIVAN) injection 3 mg, 3 mg, Intramuscular, Q1H PRN **OR** LORazepam (ATIVAN) tablet 4 mg, 4 mg, Oral, Q1H PRN **OR** LORazepam (ATIVAN) injection 4 mg, 4 mg, Intramuscular, Q1H PRNMonae, APRN - CNP    Allergies as of 09/13/2018    (No Known Allergies)       Social History     Social History    Marital status:      Spouse name: N/A    Number of children: N/A    Years of education: N/A     Occupational History    nursing student      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[place], [place] [address] History Main Topics    Smoking status: Current Every Day Smoker     Packs/day: 0.50     Types: Cigarettes    Smokeless tobacco: Never Used      Comment: started smoking age 14 and smoked up to 2 ppd since    Alcohol use 0.0 oz/week      Comment: 375ml vodka every two days    Drug use: No      Comment: denies    Sexual activity: Yes     Partners: Male     Other Topics Concern    Not on file     Social History Narrative    Lives with daughter, son in [place] for nursing school       Family History   Problem Relation Age of Onset    Heart Attack Father 54    Cancer Father 61        Lung    Heart Attack Paternal Grandfather 48    Cancer Maternal Aunt 36        Bone    Breast Cancer Maternal Aunt 25    COPD Maternal Grandfather        REVIEW OF SYSTEMS:     CONSTITUTIONAL:  negative for  fevers, chills, sweats, Positive fatigue  EYES:  negative for  double vision, blurred vision and blind spots  HEENT:  negative for  tinnitus, earaches, nasal congestion and epistaxis  RESPIRATORY:  negative for  dry cough, cough with sputum, dyspnea, wheezing and hemoptysis  CARDIOVASCULAR: as per HPI  GASTROINTESTINAL:  negative for nausea, vomiting, diarrhea, constipation, pruritus and jaundice  GENITOURINARY:  negative for frequency, dysuria, nocturia, urinary incontinence and hesitancy  HEMATOLOGIC/LYMPHATIC: negative for easy bruising, bleeding, lymphadenopathy and petechiae  ALLERGIC/IMMUNOLOGIC:  negative for urticaria, hay fever and angioedema  ENDOCRINE:  negative for heat intolerance, cold intolerance, tremor, hair loss and diabetic symptoms including neither polyuria nor polydipsia nor blurred vision  MUSCULOSKELETAL:  negative for  myalgias, arthralgias, joint swelling, stiff joints and decreased range of motion  NEUROLOGICAL:  negative for memory problems, speech problems, visual disturbance, dysphagia, weakness and numbness      PHYSICAL EXAM:   CONSTITUTIONAL:  awake, alert, cooperative, no apparent distress, and appears stated age  EYES:  lids and lashes normal and pupils equal, round and reactive to light, anicteric sclerae  HEAD:  normocepalic, without obvious abnormality, atraumatic, pink, moist mucous membranes. NECK:  Supple, symmetrical, trachea midline, no adenopathy, thyroid symmetric, not enlarged and no tenderness, skin normal  HEMATOLOGIC/LYMPHATICS:  no cervical lymphadenopathy and no supraclavicular lymphadenopathy  LUNGS:  No increased work of breathing, good air exchange, clear to auscultation bilaterally, no crackles or wheezing  CARDIOVASCULAR:  Normal apical impulse, regular rate and rhythm, normal S1 and S2, no S3 or S4, and no murmur noted and no JVD, no carotid bruit, no pedal edema, good carotid upstroke bilaterally. ABDOMEN:  Soft, nontender, no masses, no hepatomegaly or splenomegaly, BS+  CHEST: nontender to palpation, expands symmetrically  MUSCULOSKELETAL:  No clubbing no cyanosis. there is no redness, warmth, or swelling of the joints  full range of motion noted  NEUROLOGIC:  Alert, awake,oriented x3, no focal neurologic deficit was appreciated  SKIN:  no bruising or bleeding, normal skin color, texture, turgor and no redness, warmth, or swelling        BP (!) 133/92   Pulse 84   Temp 98.5 °F (36.9 °C) (Oral)   Resp 18   Ht 5' 8\" (1.727 m)   Wt 300 lb (136.1 kg)   SpO2 96%
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
## 2018-09-14 NOTE — PROGRESS NOTES
Patient attended community meeting/morning stretch.  Patient identified goal for the day as: 'Be complacent and compliant\"

## 2018-09-14 NOTE — PLAN OF CARE
Problem: Depressive Behavior With or Without Suicide Precautions:  Goal: Able to verbalize acceptance of life and situations over which he or she has no control  Able to verbalize acceptance of life and situations over which he or she has no control   Outcome: Ongoing    Goal: Absence of self-harm  Absence of self-harm   Outcome: Met This Shift  Up and about. Social with select peers. Pleasant. Receptive to interaction with good eye contact. Denies harmful thoughts or hallucinations. States the doctor took her off all her meds and started her on depakote. Asked for and received written literature on depakote. Doesn't feel she needs a mood stabilizer as she feels her mood is OK til she starts drinking. Plans to attend AA meetings after she is discharged. 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 Presently denies s/s withdrawal, states she feels a little off after taking her first dose of depakote, aware that will change as she adjusts to the med.  Will continue to monitor and assess

## 2018-09-15 LAB
ALBUMIN SERPL-MCNC: 3.8 G/DL (ref 3.5–5.2)
ALP BLD-CCNC: 62 U/L (ref 35–104)
ALT SERPL-CCNC: 116 U/L (ref 0–32)
ANION GAP SERPL CALCULATED.3IONS-SCNC: 17 MMOL/L (ref 7–16)
AST SERPL-CCNC: 85 U/L (ref 0–31)
BILIRUB SERPL-MCNC: 1 MG/DL (ref 0–1.2)
BUN BLDV-MCNC: 8 MG/DL (ref 6–20)
CALCIUM SERPL-MCNC: 9.1 MG/DL (ref 8.6–10.2)
CHLORIDE BLD-SCNC: 103 MMOL/L (ref 98–107)
CO2: 23 MMOL/L (ref 22–29)
CREAT SERPL-MCNC: 0.8 MG/DL (ref 0.5–1)
GFR AFRICAN AMERICAN: >60
GFR NON-AFRICAN AMERICAN: >60 ML/MIN/1.73
GLUCOSE BLD-MCNC: 101 MG/DL (ref 74–109)
POTASSIUM REFLEX MAGNESIUM: 3.7 MMOL/L (ref 3.5–5)
SODIUM BLD-SCNC: 143 MMOL/L (ref 132–146)
TOTAL PROTEIN: 6.3 G/DL (ref 6.4–8.3)

## 2018-09-15 PROCEDURE — 6370000000 HC RX 637 (ALT 250 FOR IP): Performed by: FAMILY MEDICINE

## 2018-09-15 PROCEDURE — 36415 COLL VENOUS BLD VENIPUNCTURE: CPT

## 2018-09-15 PROCEDURE — 6370000000 HC RX 637 (ALT 250 FOR IP): Performed by: NURSE PRACTITIONER

## 2018-09-15 PROCEDURE — 80053 COMPREHEN METABOLIC PANEL: CPT

## 2018-09-15 PROCEDURE — 99232 SBSQ HOSP IP/OBS MODERATE 35: CPT | Performed by: PSYCHIATRY & NEUROLOGY

## 2018-09-15 PROCEDURE — 1240000000 HC EMOTIONAL WELLNESS R&B

## 2018-09-15 PROCEDURE — 6370000000 HC RX 637 (ALT 250 FOR IP): Performed by: PSYCHIATRY & NEUROLOGY

## 2018-09-15 PROCEDURE — 6370000000 HC RX 637 (ALT 250 FOR IP): Performed by: EMERGENCY MEDICINE

## 2018-09-15 RX ORDER — IBUPROFEN 600 MG/1
600 TABLET ORAL EVERY 6 HOURS PRN
Status: DISCONTINUED | OUTPATIENT
Start: 2018-09-15 | End: 2018-09-17 | Stop reason: HOSPADM

## 2018-09-15 RX ORDER — DIVALPROEX SODIUM 250 MG/1
500 TABLET, DELAYED RELEASE ORAL 2 TIMES DAILY
Status: DISCONTINUED | OUTPATIENT
Start: 2018-09-15 | End: 2018-09-16

## 2018-09-15 RX ADMIN — DIVALPROEX SODIUM 500 MG: 250 TABLET, DELAYED RELEASE ORAL at 20:28

## 2018-09-15 RX ADMIN — HYDROXYZINE PAMOATE 50 MG: 50 CAPSULE ORAL at 22:53

## 2018-09-15 RX ADMIN — TRAZODONE HYDROCHLORIDE 50 MG: 50 TABLET ORAL at 20:28

## 2018-09-15 RX ADMIN — DIVALPROEX SODIUM 250 MG: 250 TABLET, DELAYED RELEASE ORAL at 09:08

## 2018-09-15 RX ADMIN — PRAZOSIN HYDROCHLORIDE 1 MG: 1 CAPSULE ORAL at 20:28

## 2018-09-15 RX ADMIN — METOPROLOL TARTRATE 50 MG: 50 TABLET, FILM COATED ORAL at 20:28

## 2018-09-15 RX ADMIN — FOLIC ACID 1 MG: 1 TABLET ORAL at 09:08

## 2018-09-15 RX ADMIN — IBUPROFEN 600 MG: 600 TABLET, FILM COATED ORAL at 20:28

## 2018-09-15 RX ADMIN — METOPROLOL TARTRATE 50 MG: 50 TABLET, FILM COATED ORAL at 09:08

## 2018-09-15 RX ADMIN — MULTIVITAMIN TABLET 1 TABLET: TABLET at 09:08

## 2018-09-15 RX ADMIN — THIAMINE HCL TAB 100 MG 100 MG: 100 TAB at 09:08

## 2018-09-15 RX ADMIN — ACETAMINOPHEN 650 MG: 325 TABLET, FILM COATED ORAL at 13:08

## 2018-09-15 ASSESSMENT — PAIN DESCRIPTION - LOCATION: LOCATION: BACK;HEAD

## 2018-09-15 ASSESSMENT — PAIN SCALES - GENERAL
PAINLEVEL_OUTOF10: 4
PAINLEVEL_OUTOF10: 3
PAINLEVEL_OUTOF10: 6
PAINLEVEL_OUTOF10: 6

## 2018-09-15 ASSESSMENT — PAIN DESCRIPTION - ORIENTATION: ORIENTATION: ANTERIOR;LOWER

## 2018-09-15 ASSESSMENT — PAIN DESCRIPTION - FREQUENCY: FREQUENCY: CONTINUOUS

## 2018-09-15 ASSESSMENT — PAIN DESCRIPTION - PAIN TYPE: TYPE: ACUTE PAIN

## 2018-09-15 ASSESSMENT — PAIN DESCRIPTION - DESCRIPTORS: DESCRIPTORS: ACHING;CRAMPING;DISCOMFORT

## 2018-09-15 ASSESSMENT — PAIN DESCRIPTION - ONSET: ONSET: GRADUAL

## 2018-09-15 NOTE — PROGRESS NOTES
**OR** LORazepam **OR** LORazepam **OR** LORazepam    No intake or output data in the 24 hours ending 09/15/18 1522    Exam:    BP (!) 125/92   Pulse 88   Temp 98.2 °F (36.8 °C) (Temporal)   Resp 16   Ht 5' 8\" (1.727 m)   Wt 300 lb (136.1 kg)   SpO2 96%   BMI 45.61 kg/m²     General appearance: No apparent distress, appears stated age and cooperative. HEENT: Pupils equal, round, and reactive to light. Conjunctivae/corneas clear. Neck: Supple, with full range of motion. No jugular venous distention. Trachea midline. Respiratory:  Normal respiratory effort. Cardiovascular: Regular rate and rhythm   Abdomen: Soft, non-tender, non-distended   Musculoskeletal: No clubbing, cyanosis or edema bilaterally. Full range of motion without deformity. Skin: Skin color, texture, turgor normal.  No rashes or lesions. Neurologic:  Neurovascularly intact without any focal sensory/motor deficits. Psychiatric: Alert and oriented, thought content appropriate, normal insight  Capillary Refill: Brisk,< 3 seconds   Peripheral Pulses: +2 palpable, equal bilaterally       Labs:   Recent Labs      09/13/18 0115   WBC  10.7   HGB  17.4*   HCT  51.3*   PLT  244     Recent Labs      09/13/18 0115 09/14/18   0732  09/15/18   0712   NA  148*  143  143   K  3.3*  3.4*  3.7   CL  104  104  103   CO2  19*  24  23   BUN  6  8  8   CREATININE  0.8  0.8  0.8   CALCIUM  9.4  8.9  9.1     Recent Labs      09/13/18 0115 09/14/18   0732  09/15/18   0712   AST  139*  62*  85*   ALT  166*  105*  116*   BILITOT  0.5  1.0  1.0   ALKPHOS  74  60  62     No results for input(s): INR in the last 72 hours. No results for input(s): Deward Feil in the last 72 hours.     Assessment/Plan:    Active Hospital Problems    Diagnosis Date Noted    HTN (hypertension) [I10] 09/14/2018    Major depressive disorder, recurrent, severe w/o psychotic behavior ([facility] [facility] [F33.2] 09/14/2018    Hypokalemia [E87.6] 09/14/2018    Depression [F32.9]

## 2018-09-15 NOTE — PROGRESS NOTES
DATE OF SERVICE:     9/15/2018    Sangeetha Tejada seen today for the purpose of continuation of care. Nursing, social work reports, laboratory studies and vital signs are reviewed. Patient chief complaint today is:             [x] Depression      [x] Anxiety        [] Psychosis         [] Suicidal/Homicidal                         [] Delusions           [] Aggression          Subjective: Today patient states that \"I feel good. I didn't sleep well last night, but my anxiety is better. I am not needing anything more than what is prescribed. \"  Patient denies SI, HI, or AVH. Sleep:  [] Good [] Fair  [x] Poor  Appetite:  [] Good [x] Fair  [] Poor    Depression:  [] Mild [] Moderate [x] Severe                [x] Constant [] Sporadic     Anxiety: [] Mild [] Moderate [x] Severe    [x] Constant [] Sporadic     Delusions: [] Mild [] Moderate [] Severe     [] Constant [] Sporadic     [] Paranoid [] Somatic [] Grandiose     Hallucinations: [] Mild [] Moderate [] Severe     [] Constant [] Sporadic    [] Auditory  [] Visual [] Tactile       Suicidal: [] Constant [] Sporadic  Homicidal: [] Constant [] Sporadic    Unscheduled Medications     [] Patient Receiving Emergency Medications \" Chemical Restraint\"   [] Requesting PRN medications for anxiety    Medical Review of Systems:     All other than marked systmes have been reviewed and are all negative.     Constitutional Symptoms: []  fever []  Chills  Skin Symptoms: [] rash []  Pruritus   Eye Symptoms: [] Vision unchanged []  recent vision problems[] blurred vision   Respiratory Symptoms:[] shortness of breath [] cough  Cardiovascular Symptoms:  [] chest pain   [] palpitations   Gastrointestinal Symptoms: []  abdominal pain []  nausea []  vomiting []  diarrhea  Genitourinary Symptoms: []  dysuria  []  hematuria   Musculoskeletal Symptoms: []  back pain []  muscle pain []  joint pain  Neurologic Symptoms: []  headache []  dizziness  Hematolymphoid Symptoms: [] Adenopathy [] Bruises   [] Schimosis       Psychiatric Review of systems  Delusions:  [] Denies [] Endorses   Withdrawals:  [] Denies [] Endorses    Hallucinations: [] Denies [] Endorses    Extra Pyramidal Symptoms: [] Denies [] Endorses      BP (!) 125/92   Pulse 88   Temp 98.2 °F (36.8 °C) (Temporal)   Resp 16   Ht 5' 8\" (1.727 m)   Wt 300 lb (136.1 kg)   SpO2 96%   BMI 45.61 kg/m²     Mental Status Examination:    Cognition:      [x] Alert  [x] Awake  [x] Oriented  [x] Person  [x] Place [x] Time      [] drowsy  [] tired  [] lethargic  [] distractable  [] Other     Attention/Concentration:   [x] Attentive  [] Distracted        Memory Recent and Remote: [x] Intact   [] Impaired [] Partially Impaired     Language: [] Able to recognize and name objects          [] Unable to recognize and name Objects    Fund of Knowledge:  [] Poor []  Fair  [] Good    Speech: [x] Normal  [] Soft  [] Slow  [] Fast [] Pressured            [] Loud [] Dysarthria  [] Incoherent    Appearance: [] Well Groomed  [x] Casual Dressed  [] Unkept  [] Disheveled          [] Normal weight[] Thin  [] Overweight  [] Obese           Attitude: [] Positive  [] Hostile  [] Demanding  [] Guarded  [] Defensive         [x] Cooperative  []  Uncooperative      Behavior:  [x] Normal Gait  [] Walks with Assistance  [] Marva Chair    [] Walks with [employer]  [] In Hospital Bed  [] Sitting in Chair    Muscle-Skeletal:  [x] Normal Muscle Tone [] Muscle Atrophy       [] Abnormal Muscle Movement     Eye Contact:  [x] Good eye contact  [] Intermittent Eye Contact  [] Poor Eye Contact     Mood: [x] Depressed  [x] Anxious  [] Irritated  [] Euthymic   [] Angry [] Restless    Affect:  [x] Congruent  [] Incongruent  [] Labile  [] Constricted  [] Flat  [] Bizarre     Thought Process and Association:  [] Logical [] Illogical       [x] Linear and Goal Directed  [] Tangential  [] Circumstantial     Thought Content:  [x] Denies [] Endorses [] Suicidal [] Homicidal  []

## 2018-09-16 PROCEDURE — 6370000000 HC RX 637 (ALT 250 FOR IP): Performed by: EMERGENCY MEDICINE

## 2018-09-16 PROCEDURE — 1240000000 HC EMOTIONAL WELLNESS R&B

## 2018-09-16 PROCEDURE — 6370000000 HC RX 637 (ALT 250 FOR IP): Performed by: PSYCHIATRY & NEUROLOGY

## 2018-09-16 PROCEDURE — 99231 SBSQ HOSP IP/OBS SF/LOW 25: CPT | Performed by: PSYCHIATRY & NEUROLOGY

## 2018-09-16 PROCEDURE — 6370000000 HC RX 637 (ALT 250 FOR IP): Performed by: NURSE PRACTITIONER

## 2018-09-16 PROCEDURE — 6370000000 HC RX 637 (ALT 250 FOR IP): Performed by: FAMILY MEDICINE

## 2018-09-16 RX ORDER — DIVALPROEX SODIUM 500 MG/1
1500 TABLET, EXTENDED RELEASE ORAL NIGHTLY
Status: DISCONTINUED | OUTPATIENT
Start: 2018-09-16 | End: 2018-09-17 | Stop reason: HOSPADM

## 2018-09-16 RX ADMIN — DIVALPROEX SODIUM 1500 MG: 500 TABLET, EXTENDED RELEASE ORAL at 20:30

## 2018-09-16 RX ADMIN — METOPROLOL TARTRATE 50 MG: 50 TABLET, FILM COATED ORAL at 20:30

## 2018-09-16 RX ADMIN — DIVALPROEX SODIUM 500 MG: 250 TABLET, DELAYED RELEASE ORAL at 08:27

## 2018-09-16 RX ADMIN — MULTIVITAMIN TABLET 1 TABLET: TABLET at 08:27

## 2018-09-16 RX ADMIN — PRAZOSIN HYDROCHLORIDE 1 MG: 1 CAPSULE ORAL at 20:30

## 2018-09-16 RX ADMIN — TRAZODONE HYDROCHLORIDE 50 MG: 50 TABLET ORAL at 20:30

## 2018-09-16 RX ADMIN — IBUPROFEN 600 MG: 600 TABLET, FILM COATED ORAL at 09:03

## 2018-09-16 RX ADMIN — HYDROXYZINE PAMOATE 50 MG: 50 CAPSULE ORAL at 20:31

## 2018-09-16 RX ADMIN — IBUPROFEN 600 MG: 600 TABLET, FILM COATED ORAL at 17:16

## 2018-09-16 RX ADMIN — ALUMINUM HYDROXIDE, MAGNESIUM HYDROXIDE, AND SIMETHICONE 30 ML: 200; 200; 20 SUSPENSION ORAL at 09:07

## 2018-09-16 RX ADMIN — METOPROLOL TARTRATE 50 MG: 50 TABLET, FILM COATED ORAL at 08:27

## 2018-09-16 RX ADMIN — THIAMINE HCL TAB 100 MG 100 MG: 100 TAB at 08:27

## 2018-09-16 RX ADMIN — ALUMINUM HYDROXIDE, MAGNESIUM HYDROXIDE, AND SIMETHICONE 30 ML: 200; 200; 20 SUSPENSION ORAL at 17:16

## 2018-09-16 RX ADMIN — FOLIC ACID 1 MG: 1 TABLET ORAL at 08:27

## 2018-09-16 ASSESSMENT — PAIN SCALES - GENERAL
PAINLEVEL_OUTOF10: 4
PAINLEVEL_OUTOF10: 7
PAINLEVEL_OUTOF10: 7

## 2018-09-16 NOTE — PLAN OF CARE
Problem: Anger Management/Homicidal Ideation:  Goal: Able to display appropriate communication and problem solving  Able to display appropriate communication and problem solving   Outcome: Ongoing      Comments: Pt denies suicidal ideations, homicidal ideations and hallucinations. Pt presents brightened on the unit. Pt social with select peers. Pt appetite appropriate. Pt stated \"This food makes me bloated but it is good. \" Pt has no complaints or concerns. Cooperative.
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
## 2018-09-16 NOTE — PROGRESS NOTES
DATE OF SERVICE:     9/16/2018    Shanice Lopez seen today for the purpose of continuation of care. Nursing, social work reports, laboratory studies and vital signs are reviewed. Patient chief complaint today is:             [x] Depression      [x] Anxiety        [] Psychosis         [] Suicidal/Homicidal                         [] Delusions           [] Aggression          Subjective: Today patient states that \"I slept the best I have since I got here last night. \"  Patient states she is doing well, just adjusting to the medications. Denies SI, HI, or AVH. Patient states that she feels the anxiety is improving. Sleep:  [] Good [x] Fair  [] Poor  Appetite:  [] Good [x] Fair  [] Poor    Depression:  [] Mild [x] Moderate [] Severe                [x] Constant [] Sporadic     Anxiety: [] Mild [x] Moderate [] Severe    [x] Constant [] Sporadic     Delusions: [] Mild [] Moderate [] Severe     [] Constant [] Sporadic     [] Paranoid [] Somatic [] Grandiose     Hallucinations: [] Mild [] Moderate [] Severe     [] Constant [] Sporadic    [] Auditory  [] Visual [] Tactile       Suicidal: [] Constant [] Sporadic  Homicidal: [] Constant [] Sporadic    Unscheduled Medications     [] Patient Receiving Emergency Medications \" Chemical Restraint\"   [] Requesting PRN medications for anxiety    Medical Review of Systems:     All other than marked systmes have been reviewed and are all negative.     Constitutional Symptoms: []  fever []  Chills  Skin Symptoms: [] rash []  Pruritus   Eye Symptoms: [] Vision unchanged []  recent vision problems[] blurred vision   Respiratory Symptoms:[] shortness of breath [] cough  Cardiovascular Symptoms:  [] chest pain   [] palpitations   Gastrointestinal Symptoms: []  abdominal pain []  nausea []  vomiting []  diarrhea  Genitourinary Symptoms: []  dysuria  []  hematuria   Musculoskeletal Symptoms: []  back pain []  muscle pain []  joint pain  Neurologic Symptoms: []  headache [] [] Suicidal [] Homicidal  [] Delusional      [] Paranoid  [] Somatic  [] Grandiose    Perception: [x]  None  [] Auditory   [] Visual  [] tactile   [] olfactory  [] Illusions         Insight: [] Intact  [] Fair  [x] Limited    Judgement:  [] Intact  [] Fair  [x] Limited      Assessment/Plan:        Patient Active Problem List   Diagnosis Code    Tobacco abuse Z72.0    Anxiety and depression F41.9, F32.9    Sinus tachycardia R00.0    Morbid obesity with BMI of 40.0-44.9, adult (HCC) E66.01, Z68.41    Alcohol abuse F10.10    GERD (gastroesophageal reflux disease) K21.9    Elevated LFTs R79.89    PVC (premature ventricular contraction) I49.3    PAC (premature atrial contraction) I49.1    Fatty liver K76.0    Severe episode of recurrent major depressive disorder, without psychotic features ([facility]) F33.2    Pain, dental K08.89    Generalized pain R52    Elevated glucose R73.09    Mixed hyperlipidemia E78.2    Depression F32.9    HTN (hypertension) I10    Major depressive disorder, recurrent, severe w/o psychotic behavior ([facility] [address] F33.2    Hypokalemia E87.6         Plan:    []  Patient is refusing medications  [x] Improving as expected   [] Not improving as expected   [] Worsening    []  At Baseline       Reason for more than one antipsychotic:  [x] N/A  [] 3 failed monotherapy(drugs tried):  [] Cross over to a new antipsychotic  [] Taper to monotherapy from polypharmacy  [] Augmentation of Clozapine therapy due to treatment resistance to single therapy      Signed:  Abbie Staples  9/16/2018  9:49 AM

## 2018-09-17 VITALS
SYSTOLIC BLOOD PRESSURE: 129 MMHG | HEART RATE: 86 BPM | TEMPERATURE: 98.4 F | HEIGHT: 68 IN | WEIGHT: 293 LBS | BODY MASS INDEX: 44.41 KG/M2 | OXYGEN SATURATION: 97 % | RESPIRATION RATE: 16 BRPM | DIASTOLIC BLOOD PRESSURE: 86 MMHG

## 2018-09-17 PROCEDURE — 6370000000 HC RX 637 (ALT 250 FOR IP): Performed by: EMERGENCY MEDICINE

## 2018-09-17 PROCEDURE — 6370000000 HC RX 637 (ALT 250 FOR IP): Performed by: FAMILY MEDICINE

## 2018-09-17 PROCEDURE — 6370000000 HC RX 637 (ALT 250 FOR IP): Performed by: NURSE PRACTITIONER

## 2018-09-17 PROCEDURE — 99238 HOSP IP/OBS DSCHRG MGMT 30/<: CPT | Performed by: PSYCHIATRY & NEUROLOGY

## 2018-09-17 RX ORDER — NICOTINE 21 MG/24HR
1 PATCH, TRANSDERMAL 24 HOURS TRANSDERMAL DAILY
Qty: 30 PATCH | Refills: 0 | Status: SHIPPED | OUTPATIENT
Start: 2018-09-17 | End: 2018-11-14

## 2018-09-17 RX ORDER — DIVALPROEX SODIUM 500 MG/1
1500 TABLET, EXTENDED RELEASE ORAL NIGHTLY
Qty: 30 TABLET | Refills: 0 | Status: SHIPPED | OUTPATIENT
Start: 2018-09-17 | End: 2018-11-14 | Stop reason: ALTCHOICE

## 2018-09-17 RX ADMIN — METOPROLOL TARTRATE 50 MG: 50 TABLET, FILM COATED ORAL at 10:01

## 2018-09-17 RX ADMIN — FOLIC ACID 1 MG: 1 TABLET ORAL at 10:02

## 2018-09-17 RX ADMIN — THIAMINE HCL TAB 100 MG 100 MG: 100 TAB at 10:02

## 2018-09-17 RX ADMIN — MULTIVITAMIN TABLET 1 TABLET: TABLET at 10:02

## 2018-09-17 RX ADMIN — IBUPROFEN 600 MG: 600 TABLET, FILM COATED ORAL at 10:03

## 2018-09-17 ASSESSMENT — PAIN SCALES - GENERAL
PAINLEVEL_OUTOF10: 2
PAINLEVEL_OUTOF10: 0

## 2018-09-17 NOTE — DISCHARGE SUMMARY
R73.09    Mixed hyperlipidemia E78.2    Depression F32.9    HTN (hypertension) I10    Major depressive disorder, recurrent, severe w/o psychotic behavior ([facility] [address] F33.2    Hypokalemia E87.6       Education and Follow-up:  Counseled:  [x] Patient     [] Family    [] Guardian      SignedMable Bowen   9/17/2018   9:55 AM

## 2018-09-17 NOTE — PROGRESS NOTES
Patient attended community meeting/morning stretch.  Patient identified goal for the day as: \"Make others smile and laugh\"

## 2018-09-17 NOTE — PROGRESS NOTES
[facility]  Discharge Note    Pt discharged with followings belongings:   Dentures: None  Vision - Corrective Lenses: None  Hearing Aid: None  Jewelry: None  Body Piercings Removed: N/A  Clothing: Pants, Shirt, Undergarments (Comment), Other (Comment) (bra,ankle bracelet or some type of bracelet, hosp. slippers    .)  Were All Patient Medications Collected?: Not Applicable  Other Valuables: Other (Comment)   Valuables sent home with pt. From locker along with filled medication. Patient left department with Departure Mode: By self via Mobility at Departure: Ambulatory, discharged to Discharged to: Private Residence. Patient education on aftercare instructions:  Give, along with suicide crisis management plan. Information faxed to [address] [place] [address] by . Patient verbalize understanding of AVS:   Yes with stated potential to comply to same.    Status EXAM upon discharge:  Status and Exam  Normal: yes  Facial Expression: Appropriate  Affect: Bright  Level of Consciousness: Alert  Mood:Normal: yes  Mood: pleasant, calm, cooperative  Motor Activity:Normal: Yes  Interview Behavior: Cooperative  Preception: Sharon to Person, Ramiro Peon to Time, Sharon to Place, Sharon to Situation  Attention:Normal: Yes  Thought Processes: organized  Thought Content:Normal: Yes  Hallucinations: None  Delusions: No  Memory:Normal: Yes  Insight and Judgment: improved  Present Suicidal Ideation: No  Present Homicidal Ideation: No    Sebastian Amor, RN

## 2018-09-17 NOTE — PLAN OF CARE
Problem: Altered Mood, Depressive Behavior:  Goal: Able to verbalize and/or display a decrease in depressive symptoms  Able to verbalize and/or display a decrease in depressive symptoms   Outcome: Ongoing  Pt resting in bed apparently asleep with easy even respirations at HS q 15 min electronic rounding. RN hourly rounds provided to pt this shift.

## 2018-09-27 LAB
EKG ATRIAL RATE: 68 BPM
EKG P AXIS: 27 DEGREES
EKG P-R INTERVAL: 178 MS
EKG Q-T INTERVAL: 434 MS
EKG QRS DURATION: 82 MS
EKG QTC CALCULATION (BAZETT): 461 MS
EKG R AXIS: 26 DEGREES
EKG T AXIS: 36 DEGREES
EKG VENTRICULAR RATE: 68 BPM

## 2018-11-14 ENCOUNTER — OFFICE VISIT (OUTPATIENT)
Dept: FAMILY MEDICINE CLINIC | Age: 38
End: 2018-11-14
Payer: COMMERCIAL

## 2018-11-14 VITALS
OXYGEN SATURATION: 98 % | TEMPERATURE: 99.2 F | HEART RATE: 92 BPM | SYSTOLIC BLOOD PRESSURE: 120 MMHG | HEIGHT: 68 IN | DIASTOLIC BLOOD PRESSURE: 76 MMHG | WEIGHT: 293 LBS | BODY MASS INDEX: 44.41 KG/M2

## 2018-11-14 DIAGNOSIS — I49.3 PVC (PREMATURE VENTRICULAR CONTRACTION): Primary | ICD-10-CM

## 2018-11-14 DIAGNOSIS — Z86.69 HISTORY OF MIGRAINE HEADACHES: ICD-10-CM

## 2018-11-14 DIAGNOSIS — R00.0 SINUS TACHYCARDIA: ICD-10-CM

## 2018-11-14 PROCEDURE — 99213 OFFICE O/P EST LOW 20 MIN: CPT | Performed by: FAMILY MEDICINE

## 2018-11-14 RX ORDER — METOPROLOL TARTRATE 50 MG/1
TABLET, FILM COATED ORAL
Qty: 60 TABLET | Refills: 6 | Status: SHIPPED | OUTPATIENT
Start: 2018-11-14 | End: 2019-05-10

## 2018-11-14 RX ORDER — ELETRIPTAN HYDROBROMIDE 40 MG/1
40 TABLET, FILM COATED ORAL
Qty: 6 TABLET | Refills: 1 | Status: SHIPPED | OUTPATIENT
Start: 2018-11-14 | End: 2019-12-14

## 2018-11-14 NOTE — PROGRESS NOTES
Allergies:  Patient has no known allergies. Social History:   TOBACCO:   reports that she has been smoking Cigarettes. She has been smoking about 0.50 packs per day. She has never used smokeless tobacco.  ETOH:   reports that she drinks alcohol. Family History:   Family History   Problem Relation Age of Onset    Heart Attack Father 54    Cancer Father 61        Lung    Heart Attack Paternal Grandfather 48    Cancer Maternal Aunt 36        Bone    Breast Cancer Maternal Aunt 25    COPD Maternal Grandfather        REVIEW OF SYSTEMS:     Review of Systems - General ROS: negative for - fever  Psychological ROS: denies depression and denies suicidal ideation--states she stopped drinking alcohol   Cardiovascular ROS: denies chest pain or palpitations   Neurological ROS: no TIA or stroke symptoms; she has chronic migraines - usually in the back of her head   Relieved with medication and rest.  Denies any associated neurological symptoms with the migraines       Physical Exam   /76 (Site: Right Upper Arm, Position: Sitting, Cuff Size: Large Adult)   Pulse 92   Temp 99.2 °F (37.3 °C) (Oral)   Ht 5' 8\" (1.727 m)   Wt (!) 305 lb (138.3 kg)   SpO2 98%   BMI 46.38 kg/m²   Physical Examination: General appearance - alert, well appearing, and in no distress  Mental status - normal mood, behavior, speech, dress, motor activity, and thought processes  Eyes - pupils equal and reactive, extraocular eye movements intact  Ears - bilateral TM's and external ear canals normal  Nose - normal and patent, no erythema, discharge or polyps  Mouth - mucous membranes moist, pharynx normal without lesions  Chest - clear to auscultation, no wheezes, rales or rhonchi, symmetric air entry  Heart - normal rate and regular rhythm, S1 and S2 normal      Dougherty was seen today for migraine medication. she has used the Relpax for some time with relief of her migraine symptoms.  She denies any side effects with use of the

## 2018-11-25 DIAGNOSIS — Z86.69 HISTORY OF MIGRAINE HEADACHES: ICD-10-CM

## 2018-11-26 RX ORDER — ELETRIPTAN HYDROBROMIDE 40 MG/1
TABLET, FILM COATED ORAL
Qty: 6 TABLET | Refills: 0 | OUTPATIENT
Start: 2018-11-26

## 2018-12-19 ENCOUNTER — INITIAL CONSULT (OUTPATIENT)
Dept: BARIATRICS/WEIGHT MGMT | Age: 38
End: 2018-12-19
Payer: COMMERCIAL

## 2018-12-19 VITALS
HEART RATE: 77 BPM | RESPIRATION RATE: 18 BRPM | WEIGHT: 293 LBS | SYSTOLIC BLOOD PRESSURE: 115 MMHG | TEMPERATURE: 97.7 F | HEIGHT: 68 IN | DIASTOLIC BLOOD PRESSURE: 77 MMHG | BODY MASS INDEX: 44.41 KG/M2

## 2018-12-19 DIAGNOSIS — E46 MALNUTRITION, UNSPECIFIED TYPE (HCC): ICD-10-CM

## 2018-12-19 DIAGNOSIS — K21.9 GASTROESOPHAGEAL REFLUX DISEASE WITHOUT ESOPHAGITIS: ICD-10-CM

## 2018-12-19 DIAGNOSIS — K30 INDIGESTION: ICD-10-CM

## 2018-12-19 DIAGNOSIS — E66.01 MORBID OBESITY DUE TO EXCESS CALORIES (HCC): Primary | ICD-10-CM

## 2018-12-19 PROCEDURE — 99204 OFFICE O/P NEW MOD 45 MIN: CPT | Performed by: SURGERY

## 2018-12-19 PROCEDURE — 99203 OFFICE O/P NEW LOW 30 MIN: CPT

## 2018-12-19 NOTE — PATIENT INSTRUCTIONS
What is the next step to proceed with weight loss surgery? Please be aware that any co-pays or deductibles may be requested prior to testing and / or procedures. You will need to schedule a psychological evaluation for weight loss surgery. Patients will be required to complete all psychological testing as required by the psychologist. Patients must follow all of the psychologist's recommendations before weight loss surgery can be scheduled. The evaluation must be done a standard way for weight loss surgery. We strongly recommend that you contact one of our preferred providers listed below to arrange this:    Dr. Jun James, PhD [address]        [phone]      Dr. Viktor Foote, PhD   [facility]. [place], [place]         [phone]    You will also need to plan on attending a 2 hour nutrition class at the [facility] prior to your surgery. We will schedule this for you when we schedule your surgery. Please remember to have your labs drawn prior to your scheduled appointment to review the results of your testing. Please remember, that while we will submit your case to insurance for surgery authorization, it is your responsibility to know if your plan covers weight loss surgery and keep up-to-date with changes to your insurance coverage. We will do everything possible to help you get approved for weight loss surgery, but cannot guarantee an approval.     Please note that you will not be submitted to your insurance company until all   pre-operative testing requirements are met.

## 2018-12-20 ENCOUNTER — TELEPHONE (OUTPATIENT)
Dept: FAMILY MEDICINE CLINIC | Age: 38
End: 2018-12-20

## 2018-12-26 ENCOUNTER — INITIAL CONSULT (OUTPATIENT)
Dept: BARIATRICS/WEIGHT MGMT | Age: 38
End: 2018-12-26
Payer: COMMERCIAL

## 2018-12-26 ENCOUNTER — PREP FOR PROCEDURE (OUTPATIENT)
Dept: BARIATRICS/WEIGHT MGMT | Age: 38
End: 2018-12-26

## 2018-12-26 VITALS — HEIGHT: 68 IN | BODY MASS INDEX: 44.41 KG/M2 | WEIGHT: 293 LBS

## 2018-12-26 DIAGNOSIS — Z71.3 NUTRITIONAL COUNSELING: Primary | ICD-10-CM

## 2018-12-26 PROCEDURE — 99999 PR OFFICE/OUTPT VISIT,PROCEDURE ONLY: CPT

## 2018-12-26 RX ORDER — 0.9 % SODIUM CHLORIDE 0.9 %
10 VIAL (ML) INJECTION PRN
Status: CANCELLED | OUTPATIENT
Start: 2018-12-26

## 2018-12-26 RX ORDER — SODIUM CHLORIDE, SODIUM LACTATE, POTASSIUM CHLORIDE, CALCIUM CHLORIDE 600; 310; 30; 20 MG/100ML; MG/100ML; MG/100ML; MG/100ML
INJECTION, SOLUTION INTRAVENOUS CONTINUOUS
Status: CANCELLED | OUTPATIENT
Start: 2018-12-26

## 2018-12-26 RX ORDER — 0.9 % SODIUM CHLORIDE 0.9 %
10 VIAL (ML) INJECTION EVERY 12 HOURS SCHEDULED
Status: CANCELLED | OUTPATIENT
Start: 2018-12-26

## 2018-12-26 NOTE — PATIENT INSTRUCTIONS
[facility]  Dietary Initial Appointment Patient Instructions    By: Harjeet Hartman RD / MOHAN  [phone]      At your initial dietary appointment you have received the following information packets:    Please be aware at each visit you have been instructed that in order for your insurance company to approve your surgery you must show a consistent weight loss of 2 lbs or greater at each visit. We can not guarantee an approval by your insurance company we can only provide the information given to us it is up to you the patient to show compliancy to your insurance company. If you do gain weight during your supervised weight loss counseling sessions insurance companies starting in 2018 are denying patients for not showing consistent weight loss results when part of a supervised weight loss counseling program. Pt was instructed on 12/26/18. Pt verbalized understanding. · Low-Fat Diet  - Please be sure to begin your low-fat diet from today's date until your scheduled surgery date. If you are not at goal weight by your history and physical appointment with your surgeon your surgery will be cancelled. · Goal Weight: 284 lb  (BMI 43.1)  · Low-Fat Diet Contract - Patient Acknowledge and Signed  · Supplement List - Please be sure to be saving to purchase your 3 month supply of supplements. If you do not bring supplements to your history and physical appointment your surgery will be cancelled. · Supplement Contract - Patient Acknowledge and Signed  · Please be sure to take a daily Multi-vitamin from now until surgery to reduce your incidence of infection. · If your insurance company requires additional dietary counseling you will be scheduled to see the dietitian the following month. ·  If your psychological evaluation is completed and all your dietary requirements for your individual insurance company have been completed you will be submitted to insurance.  Please make

## 2018-12-28 ENCOUNTER — ANESTHESIA EVENT (OUTPATIENT)
Dept: ENDOSCOPY | Age: 38
End: 2018-12-28
Payer: COMMERCIAL

## 2018-12-28 ENCOUNTER — HOSPITAL ENCOUNTER (OUTPATIENT)
Age: 38
Setting detail: OUTPATIENT SURGERY
Discharge: HOME | End: 2018-12-28
Attending: SURGERY | Admitting: SURGERY
Payer: COMMERCIAL

## 2018-12-28 ENCOUNTER — ANESTHESIA (OUTPATIENT)
Dept: ENDOSCOPY | Age: 38
End: 2018-12-28
Payer: COMMERCIAL

## 2018-12-28 ENCOUNTER — TELEPHONE (OUTPATIENT)
Dept: FAMILY MEDICINE CLINIC | Age: 38
End: 2018-12-28

## 2018-12-28 ENCOUNTER — HOSPITAL ENCOUNTER (OUTPATIENT)
Dept: ULTRASOUND IMAGING | Age: 38
Discharge: HOME | End: 2018-12-28
Payer: COMMERCIAL

## 2018-12-28 VITALS
SYSTOLIC BLOOD PRESSURE: 107 MMHG | HEIGHT: 68 IN | WEIGHT: 293 LBS | BODY MASS INDEX: 44.41 KG/M2 | OXYGEN SATURATION: 98 % | HEART RATE: 70 BPM | DIASTOLIC BLOOD PRESSURE: 64 MMHG | RESPIRATION RATE: 18 BRPM | TEMPERATURE: 97.4 F

## 2018-12-28 VITALS
SYSTOLIC BLOOD PRESSURE: 113 MMHG | OXYGEN SATURATION: 92 % | DIASTOLIC BLOOD PRESSURE: 81 MMHG | RESPIRATION RATE: 12 BRPM

## 2018-12-28 DIAGNOSIS — E55.9 VITAMIN D DEFICIENCY: Primary | ICD-10-CM

## 2018-12-28 DIAGNOSIS — K30 INDIGESTION: ICD-10-CM

## 2018-12-28 DIAGNOSIS — E46 MALNUTRITION, UNSPECIFIED TYPE (HCC): ICD-10-CM

## 2018-12-28 DIAGNOSIS — E66.01 MORBID OBESITY DUE TO EXCESS CALORIES (HCC): ICD-10-CM

## 2018-12-28 LAB
ALBUMIN SERPL-MCNC: 4.2 G/DL (ref 3.5–5.2)
ALP BLD-CCNC: 67 U/L (ref 35–104)
ALT SERPL-CCNC: 25 U/L (ref 0–32)
ANION GAP SERPL CALCULATED.3IONS-SCNC: 10 MMOL/L (ref 7–16)
AST SERPL-CCNC: 17 U/L (ref 0–31)
BILIRUB SERPL-MCNC: 0.9 MG/DL (ref 0–1.2)
BUN BLDV-MCNC: 16 MG/DL (ref 6–20)
CALCIUM SERPL-MCNC: 9.2 MG/DL (ref 8.6–10.2)
CHLORIDE BLD-SCNC: 103 MMOL/L (ref 98–107)
CHOLESTEROL, TOTAL: 180 MG/DL (ref 0–199)
CO2: 29 MMOL/L (ref 22–29)
CREAT SERPL-MCNC: 0.8 MG/DL (ref 0.5–1)
FERRITIN: 101 NG/ML
FOLATE: 4.9 NG/ML (ref 4.8–24.2)
GFR AFRICAN AMERICAN: >60
GFR NON-AFRICAN AMERICAN: >60 ML/MIN/1.73
GLUCOSE BLD-MCNC: 92 MG/DL (ref 74–99)
HBA1C MFR BLD: 5.1 % (ref 4–5.6)
HCG(URINE) PREGNANCY TEST: NEGATIVE
HCT VFR BLD CALC: 45.1 % (ref 34–48)
HEMOGLOBIN: 15.1 G/DL (ref 11.5–15.5)
MCH RBC QN AUTO: 31.3 PG (ref 26–35)
MCHC RBC AUTO-ENTMCNC: 33.5 % (ref 32–34.5)
MCV RBC AUTO: 93.4 FL (ref 80–99.9)
PDW BLD-RTO: 13.2 FL (ref 11.5–15)
PLATELET # BLD: 223 E9/L (ref 130–450)
PMV BLD AUTO: 10.6 FL (ref 7–12)
POTASSIUM SERPL-SCNC: 3.8 MMOL/L (ref 3.5–5)
RBC # BLD: 4.83 E12/L (ref 3.5–5.5)
SODIUM BLD-SCNC: 142 MMOL/L (ref 132–146)
TOTAL PROTEIN: 7.1 G/DL (ref 6.4–8.3)
TRIGL SERPL-MCNC: 127 MG/DL (ref 0–149)
VITAMIN B-12: 272 PG/ML (ref 211–946)
VITAMIN D 25-HYDROXY: 19 NG/ML (ref 30–100)
WBC # BLD: 10.3 E9/L (ref 4.5–11.5)

## 2018-12-28 PROCEDURE — 83036 HEMOGLOBIN GLYCOSYLATED A1C: CPT

## 2018-12-28 PROCEDURE — 3609012400 HC EGD TRANSORAL BIOPSY SINGLE/MULTIPLE: Performed by: SURGERY

## 2018-12-28 PROCEDURE — 84630 ASSAY OF ZINC: CPT

## 2018-12-28 PROCEDURE — 6360000002 HC RX W HCPCS: Performed by: NURSE ANESTHETIST, CERTIFIED REGISTERED

## 2018-12-28 PROCEDURE — 3700000000 HC ANESTHESIA ATTENDED CARE: Performed by: SURGERY

## 2018-12-28 PROCEDURE — 82746 ASSAY OF FOLIC ACID SERUM: CPT

## 2018-12-28 PROCEDURE — 7100000010 HC PHASE II RECOVERY - FIRST 15 MIN: Performed by: SURGERY

## 2018-12-28 PROCEDURE — 2709999900 HC NON-CHARGEABLE SUPPLY: Performed by: SURGERY

## 2018-12-28 PROCEDURE — 81025 URINE PREGNANCY TEST: CPT

## 2018-12-28 PROCEDURE — 82465 ASSAY BLD/SERUM CHOLESTEROL: CPT

## 2018-12-28 PROCEDURE — 82728 ASSAY OF FERRITIN: CPT

## 2018-12-28 PROCEDURE — 7100000011 HC PHASE II RECOVERY - ADDTL 15 MIN: Performed by: SURGERY

## 2018-12-28 PROCEDURE — 99024 POSTOP FOLLOW-UP VISIT: CPT | Performed by: SURGERY

## 2018-12-28 PROCEDURE — 80053 COMPREHEN METABOLIC PANEL: CPT

## 2018-12-28 PROCEDURE — 2580000003 HC RX 258: Performed by: SURGERY

## 2018-12-28 PROCEDURE — 84478 ASSAY OF TRIGLYCERIDES: CPT

## 2018-12-28 PROCEDURE — 82607 VITAMIN B-12: CPT

## 2018-12-28 PROCEDURE — 84425 ASSAY OF VITAMIN B-1: CPT

## 2018-12-28 PROCEDURE — 36415 COLL VENOUS BLD VENIPUNCTURE: CPT

## 2018-12-28 PROCEDURE — 88305 TISSUE EXAM BY PATHOLOGIST: CPT

## 2018-12-28 PROCEDURE — 43239 EGD BIOPSY SINGLE/MULTIPLE: CPT | Performed by: SURGERY

## 2018-12-28 PROCEDURE — 82306 VITAMIN D 25 HYDROXY: CPT

## 2018-12-28 PROCEDURE — 3700000001 HC ADD 15 MINUTES (ANESTHESIA): Performed by: SURGERY

## 2018-12-28 PROCEDURE — 76705 ECHO EXAM OF ABDOMEN: CPT

## 2018-12-28 PROCEDURE — 85027 COMPLETE CBC AUTOMATED: CPT

## 2018-12-28 RX ORDER — 0.9 % SODIUM CHLORIDE 0.9 %
10 VIAL (ML) INJECTION EVERY 12 HOURS SCHEDULED
Status: DISCONTINUED | OUTPATIENT
Start: 2018-12-28 | End: 2018-12-28 | Stop reason: HOSPADM

## 2018-12-28 RX ORDER — PROPOFOL 10 MG/ML
INJECTION, EMULSION INTRAVENOUS CONTINUOUS PRN
Status: DISCONTINUED | OUTPATIENT
Start: 2018-12-28 | End: 2018-12-28 | Stop reason: SDUPTHER

## 2018-12-28 RX ORDER — FENTANYL CITRATE 50 UG/ML
INJECTION, SOLUTION INTRAMUSCULAR; INTRAVENOUS PRN
Status: DISCONTINUED | OUTPATIENT
Start: 2018-12-28 | End: 2018-12-28 | Stop reason: SDUPTHER

## 2018-12-28 RX ORDER — 0.9 % SODIUM CHLORIDE 0.9 %
10 VIAL (ML) INJECTION PRN
Status: DISCONTINUED | OUTPATIENT
Start: 2018-12-28 | End: 2018-12-28 | Stop reason: HOSPADM

## 2018-12-28 RX ORDER — SODIUM CHLORIDE, SODIUM LACTATE, POTASSIUM CHLORIDE, CALCIUM CHLORIDE 600; 310; 30; 20 MG/100ML; MG/100ML; MG/100ML; MG/100ML
INJECTION, SOLUTION INTRAVENOUS CONTINUOUS
Status: DISCONTINUED | OUTPATIENT
Start: 2018-12-28 | End: 2018-12-28 | Stop reason: HOSPADM

## 2018-12-28 RX ADMIN — PROPOFOL 100 MCG/KG/MIN: 10 INJECTION, EMULSION INTRAVENOUS at 09:22

## 2018-12-28 RX ADMIN — SODIUM CHLORIDE, POTASSIUM CHLORIDE, SODIUM LACTATE AND CALCIUM CHLORIDE: 600; 310; 30; 20 INJECTION, SOLUTION INTRAVENOUS at 08:43

## 2018-12-28 RX ADMIN — FENTANYL CITRATE 100 MCG: 50 INJECTION, SOLUTION INTRAMUSCULAR; INTRAVENOUS at 09:16

## 2018-12-28 ASSESSMENT — ENCOUNTER SYMPTOMS: SHORTNESS OF BREATH: 1

## 2018-12-28 ASSESSMENT — PAIN SCALES - GENERAL
PAINLEVEL_OUTOF10: 0
PAINLEVEL_OUTOF10: 0

## 2018-12-28 ASSESSMENT — PAIN - FUNCTIONAL ASSESSMENT: PAIN_FUNCTIONAL_ASSESSMENT: 0-10

## 2018-12-28 NOTE — ANESTHESIA PRE PROCEDURE
Last 3 Encounters:   12/27/18 297 lb (134.7 kg)   12/26/18 297 lb (134.7 kg)   12/19/18 297 lb (134.7 kg)     Body mass index is 45.16 kg/m². CBC:   Lab Results   Component Value Date    WBC 10.7 09/13/2018    RBC 5.38 09/13/2018    HGB 17.4 09/13/2018    HCT 51.3 09/13/2018    MCV 95.4 09/13/2018    RDW 13.8 09/13/2018     09/13/2018       CMP:   Lab Results   Component Value Date     09/15/2018    K 3.7 09/15/2018     09/15/2018    CO2 23 09/15/2018    BUN 8 09/15/2018    CREATININE 0.8 09/15/2018    GFRAA >60 09/15/2018    LABGLOM >60 09/15/2018    GLUCOSE 101 09/15/2018    GLUCOSE 94 08/19/2011    PROT 6.3 09/15/2018    CALCIUM 9.1 09/15/2018    BILITOT 1.0 09/15/2018    ALKPHOS 62 09/15/2018    AST 85 09/15/2018     09/15/2018       POC Tests: No results for input(s): POCGLU, POCNA, POCK, POCCL, POCBUN, POCHEMO, POCHCT in the last 72 hours. Coags:   Lab Results   Component Value Date    PROTIME 10.2 08/03/2011    INR 0.8 08/03/2011    APTT 27.5 08/03/2011       HCG (If Applicable):   Lab Results   Component Value Date    PREGTESTUR Neg 09/13/2018    PREGSERUM NEGATIVE 04/25/2011    HCG <2.0 02/19/2011        ABGs: No results found for: PHART, PO2ART, YTY2GRR, DFP7FOY, BEART, W7QXFRRK     Type & Screen (If Applicable):  No results found for: LABABO, [address]    Anesthesia Evaluation  Patient summary reviewed  Airway: Mallampati: I  TM distance: >3 FB   Neck ROM: full  Mouth opening: > = 3 FB Dental:          Pulmonary: breath sounds clear to auscultation  (+) shortness of breath:                            ROS comment: H/O Acute Bronchitis. Current Smoker . 5 ppd  Tobacco abuse. Abuse of ETOH with acute intoxication. Abuse of Benzodiazepine.    Cardiovascular:    (+) hypertension:, valvular problems/murmurs: MVP, dysrhythmias (Irregular Heart Beats with palpitation.): ventricular tachycardia, BECERRA:,       ECG reviewed  Rhythm: regular  Rate: normal  Echocardiogram reviewed

## 2018-12-30 LAB — ZINC: 86 UG/DL (ref 60–120)

## 2018-12-31 LAB — VITAMIN B1 WHOLE BLOOD: 103 NMOL/L (ref 70–180)

## 2019-01-12 ENCOUNTER — HOSPITAL ENCOUNTER (EMERGENCY)
Dept: HOSPITAL 83 - ED | Age: 39
Discharge: HOME | End: 2019-01-12
Payer: COMMERCIAL

## 2019-01-12 VITALS — WEIGHT: 290 LBS | BODY MASS INDEX: 43.95 KG/M2 | HEIGHT: 67.99 IN

## 2019-01-12 DIAGNOSIS — G43.909: Primary | ICD-10-CM

## 2019-01-12 DIAGNOSIS — Z88.8: ICD-10-CM

## 2019-01-12 LAB
ALBUMIN SERPL-MCNC: 3.8 GM/DL (ref 3.1–4.5)
ALP SERPL-CCNC: 87 U/L (ref 45–117)
ALT SERPL W P-5'-P-CCNC: 32 U/L (ref 12–78)
AST SERPL-CCNC: 16 IU/L (ref 3–35)
BASOPHILS # BLD AUTO: 0 10*3/UL (ref 0–0.1)
BASOPHILS NFR BLD AUTO: 0.2 % (ref 0–1)
BUN SERPL-MCNC: 17 MG/DL (ref 7–24)
CHLORIDE SERPL-SCNC: 106 MMOL/L (ref 98–107)
CREAT SERPL-MCNC: 0.92 MG/DL (ref 0.55–1.02)
EOSINOPHIL # BLD AUTO: 0.3 10*3/UL (ref 0–0.4)
EOSINOPHIL # BLD AUTO: 2.4 % (ref 1–4)
ERYTHROCYTE [DISTWIDTH] IN BLOOD BY AUTOMATED COUNT: 13.2 % (ref 0–14.5)
HCT VFR BLD AUTO: 46.5 % (ref 37–47)
HGB BLD-MCNC: 15.6 G/DL (ref 12–16)
LYMPHOCYTES # BLD AUTO: 2.3 10*3/UL (ref 1.3–4.4)
LYMPHOCYTES NFR BLD AUTO: 21.7 % (ref 27–41)
MCH RBC QN AUTO: 31.1 PG (ref 27–31)
MCHC RBC AUTO-ENTMCNC: 33.5 G/DL (ref 33–37)
MCV RBC AUTO: 92.8 FL (ref 81–99)
MONOCYTES # BLD AUTO: 0.8 10*3/UL (ref 0.1–1)
MONOCYTES NFR BLD MANUAL: 7.2 % (ref 3–9)
NEUT #: 7.1 10*3/UL (ref 2.3–7.9)
NEUT %: 68 % (ref 47–73)
NRBC BLD QL AUTO: 0 % (ref 0–0)
PLATELET # BLD AUTO: 250 10*3/UL (ref 130–400)
PMV BLD AUTO: 10.2 FL (ref 9.6–12.3)
POTASSIUM SERPL-SCNC: 3.8 MMOL/L (ref 3.5–5.1)
PROT SERPL-MCNC: 7.4 GM/DL (ref 6.4–8.2)
RBC # BLD AUTO: 5.01 10*6/UL (ref 4.1–5.1)
SODIUM SERPL-SCNC: 141 MMOL/L (ref 136–145)
WBC NRBC COR # BLD AUTO: 10.4 10*3/UL (ref 4.8–10.8)

## 2019-01-29 ENCOUNTER — INITIAL CONSULT (OUTPATIENT)
Dept: BARIATRICS/WEIGHT MGMT | Age: 39
End: 2019-01-29
Payer: COMMERCIAL

## 2019-01-29 VITALS — WEIGHT: 293 LBS | BODY MASS INDEX: 44.55 KG/M2

## 2019-01-29 DIAGNOSIS — Z71.3 DIETARY COUNSELING: Primary | ICD-10-CM

## 2019-01-29 PROCEDURE — 99999 PR OFFICE/OUTPT VISIT,PROCEDURE ONLY: CPT | Performed by: SURGERY

## 2019-02-22 ENCOUNTER — TELEPHONE (OUTPATIENT)
Dept: FAMILY MEDICINE CLINIC | Age: 39
End: 2019-02-22

## 2019-02-22 DIAGNOSIS — N30.90 CYSTITIS: Primary | ICD-10-CM

## 2019-02-22 RX ORDER — CEFDINIR 300 MG/1
300 CAPSULE ORAL 2 TIMES DAILY
Qty: 10 CAPSULE | Refills: 0 | Status: SHIPPED | OUTPATIENT
Start: 2019-02-22 | End: 2019-02-27

## 2019-02-26 ENCOUNTER — INITIAL CONSULT (OUTPATIENT)
Dept: BARIATRICS/WEIGHT MGMT | Age: 39
End: 2019-02-26
Payer: COMMERCIAL

## 2019-02-26 DIAGNOSIS — Z71.3 DIETARY COUNSELING: Primary | ICD-10-CM

## 2019-02-26 DIAGNOSIS — Z01.818 PRE-OP EXAMINATION: Primary | ICD-10-CM

## 2019-02-26 PROCEDURE — 99999 PR OFFICE/OUTPT VISIT,PROCEDURE ONLY: CPT | Performed by: SURGERY

## 2019-02-27 ENCOUNTER — TELEPHONE (OUTPATIENT)
Dept: BARIATRICS/WEIGHT MGMT | Age: 39
End: 2019-02-27

## 2019-02-28 ENCOUNTER — TELEPHONE (OUTPATIENT)
Dept: ADMINISTRATIVE | Age: 39
End: 2019-02-28

## 2019-03-01 VITALS — WEIGHT: 292 LBS | BODY MASS INDEX: 44.4 KG/M2

## 2019-03-05 ENCOUNTER — TELEPHONE (OUTPATIENT)
Dept: BARIATRICS/WEIGHT MGMT | Age: 39
End: 2019-03-05

## 2019-03-08 ENCOUNTER — TELEPHONE (OUTPATIENT)
Dept: BARIATRICS/WEIGHT MGMT | Age: 39
End: 2019-03-08

## 2019-03-22 PROBLEM — L40.9 PSORIASIS OF VULVA: Status: ACTIVE | Noted: 2019-03-22

## 2019-03-26 ENCOUNTER — INITIAL CONSULT (OUTPATIENT)
Dept: BARIATRICS/WEIGHT MGMT | Age: 39
End: 2019-03-26
Payer: COMMERCIAL

## 2019-03-26 DIAGNOSIS — Z71.3 NUTRITIONAL COUNSELING: Primary | ICD-10-CM

## 2019-03-26 PROCEDURE — 99999 PR OFFICE/OUTPT VISIT,PROCEDURE ONLY: CPT | Performed by: SURGERY

## 2019-04-05 VITALS — BODY MASS INDEX: 44.7 KG/M2 | WEIGHT: 293 LBS

## 2019-04-05 NOTE — PROGRESS NOTES
WEIGHT:  294 lb (133.4 kg)    Pt was in th office for his/her 4th weight Loss appointment. Pt is aware he/she must meet his/her pre-op weight loss goal in order to proceed with weight loss surgery. Hugo / Luke faxed a copy of what was reviewed with the pt to her PCP. Pt verbalized understanding. Hugo// Luke enc the following at today's visit for successful post-surgical Weight Maintenance    1. Weigh yourself daily and record it. 2. Keep documented food records daily   3. Just be more active in day to day routine   4. Higher protein intake and a higher fiber intake. Not a high protein or a high fiber diet just a higher intake. 5.Eliminate empty calorie consumption    Hugo Butler addressed the following with the pt:  - Hugo Butler enc pt to comply with nutrition recommendations  - Hugo / Ld enc Participation in support group meetings  - Hugo Butler enc pt to go back to maintenance of food records and weight monitoring records   - Hugo Butler reviewed the importance of adequate sleep and stress management  - Hugo Butler reviewed nonfood strategies to cope with emotions and stress  - Hugo Butler encouraged pt to practice the following: Mindful eating: Eating slowly: Focusing   on the eating experience without distraction  - Hugo Butler enc. pt to pay attention to hunger and fullness  - Hugo / Luke enc meal planning  - Hugo Rios pt to chose nutrient dense whole foods instead of soft, high calorie foods  - Hugo / Luke enc not dr Shen Brandon large amounts of fluids with or immediately after meals    Portion control ,meal planning and avoiding empty calorie consumption. Hugo / Luke reviewed insurance company weight loss requirement on 3/26/19. Pt verbalized understanding. Please be aware at each visit you have been instructed that in order for your insurance company to approve your surgery you must show a consistent weight loss of 2 lbs or greater at each visit.  We can not guarantee an approval by your insurance company we can only provide the information given to us

## 2019-04-19 ENCOUNTER — OFFICE VISIT (OUTPATIENT)
Dept: BARIATRICS/WEIGHT MGMT | Age: 39
End: 2019-04-19
Payer: COMMERCIAL

## 2019-04-19 DIAGNOSIS — E55.9 VITAMIN D DEFICIENCY: Primary | ICD-10-CM

## 2019-04-19 PROCEDURE — G8417 CALC BMI ABV UP PARAM F/U: HCPCS | Performed by: INTERNAL MEDICINE

## 2019-04-19 PROCEDURE — G8428 CUR MEDS NOT DOCUMENT: HCPCS | Performed by: INTERNAL MEDICINE

## 2019-04-19 PROCEDURE — 99211 OFF/OP EST MAY X REQ PHY/QHP: CPT

## 2019-04-19 PROCEDURE — 99201 PR OFFICE OUTPATIENT NEW 10 MINUTES: CPT | Performed by: INTERNAL MEDICINE

## 2019-04-19 PROCEDURE — 4004F PT TOBACCO SCREEN RCVD TLK: CPT | Performed by: INTERNAL MEDICINE

## 2019-04-19 RX ORDER — CHOLECALCIFEROL (VITAMIN D3) 1250 MCG
CAPSULE ORAL
Qty: 12 CAPSULE | Refills: 0 | Status: SHIPPED | OUTPATIENT
Start: 2019-04-19 | End: 2019-12-14 | Stop reason: ALTCHOICE

## 2019-04-19 NOTE — PATIENT INSTRUCTIONS
Begin taking Vitamin D3 50,000 IU, one capsule once each week for 12 weeks. Return to see Dr. Jimbo Matthews in 13 weeks  Repeat Vit D 25OH level 2-3 days prior to appointment with Dr. Jimbo Matthews  Do not have the above lab drawn on the same day as you take one of the vitamin D capsules.

## 2019-04-19 NOTE — PROGRESS NOTES
Date of Encounter: 4/19/19    Chief Complaint: Low vitamin D    HPI:     Duncan Willis presents to the clinic today for evaluation and treatment of a low vitamin D level. She is preparing for a bariatric procedure and needs her level normalized prior to proceeding. Lab and treatment history are as follows:   Date     Vit D-OH level    Treatment prescribed    12/28/2018     19   None yet    PE:     LMP 03/05/2019     Pt is WN, WD, and in NAD    A/P:   1. Low Vitamin D - uncontrolled   Vitamin D3 50k IU once each week for 12 weeks.     2.  Follow up          Return to see me in 13 weeks          Repeat Vit D 25OH level 2-3 days prior to appt with me    Diego Thomas MD  Endocrinology/Obesity  4/19/19

## 2019-04-30 ENCOUNTER — TELEPHONE (OUTPATIENT)
Dept: BARIATRICS/WEIGHT MGMT | Age: 39
End: 2019-04-30

## 2019-04-30 NOTE — TELEPHONE ENCOUNTER
Pt was a no show at the nutrition class on 4/23/19.   Called and spoke with pt and scheduled her for 5/21/19 at 1:30 PM.

## 2019-05-01 ENCOUNTER — HOSPITAL ENCOUNTER (EMERGENCY)
Age: 39
Discharge: HOME | End: 2019-05-01
Attending: EMERGENCY MEDICINE
Payer: COMMERCIAL

## 2019-05-01 ENCOUNTER — APPOINTMENT (OUTPATIENT)
Dept: GENERAL RADIOLOGY | Age: 39
End: 2019-05-01
Payer: COMMERCIAL

## 2019-05-01 VITALS
BODY MASS INDEX: 43.19 KG/M2 | HEART RATE: 79 BPM | DIASTOLIC BLOOD PRESSURE: 83 MMHG | WEIGHT: 285 LBS | SYSTOLIC BLOOD PRESSURE: 125 MMHG | RESPIRATION RATE: 14 BRPM | HEIGHT: 68 IN | OXYGEN SATURATION: 98 % | TEMPERATURE: 99.5 F

## 2019-05-01 DIAGNOSIS — R42 DIZZINESS: Primary | ICD-10-CM

## 2019-05-01 LAB
ALBUMIN SERPL-MCNC: 4.4 G/DL (ref 3.5–5.2)
ALP BLD-CCNC: 70 U/L (ref 35–104)
ALT SERPL-CCNC: 78 U/L (ref 0–32)
ANION GAP SERPL CALCULATED.3IONS-SCNC: 12 MMOL/L (ref 7–16)
AST SERPL-CCNC: 40 U/L (ref 0–31)
BASOPHILS ABSOLUTE: 0.05 E9/L (ref 0–0.2)
BASOPHILS RELATIVE PERCENT: 0.5 % (ref 0–2)
BILIRUB SERPL-MCNC: 0.6 MG/DL (ref 0–1.2)
BUN BLDV-MCNC: 12 MG/DL (ref 6–20)
CALCIUM SERPL-MCNC: 9.7 MG/DL (ref 8.6–10.2)
CHLORIDE BLD-SCNC: 104 MMOL/L (ref 98–107)
CO2: 25 MMOL/L (ref 22–29)
CREAT SERPL-MCNC: 0.8 MG/DL (ref 0.5–1)
EOSINOPHILS ABSOLUTE: 0.23 E9/L (ref 0.05–0.5)
EOSINOPHILS RELATIVE PERCENT: 2.1 % (ref 0–6)
GFR AFRICAN AMERICAN: >60
GFR NON-AFRICAN AMERICAN: >60 ML/MIN/1.73
GLUCOSE BLD-MCNC: 104 MG/DL (ref 74–99)
HCT VFR BLD CALC: 48.1 % (ref 34–48)
HEMOGLOBIN: 15.8 G/DL (ref 11.5–15.5)
IMMATURE GRANULOCYTES #: 0.07 E9/L
IMMATURE GRANULOCYTES %: 0.7 % (ref 0–5)
LYMPHOCYTES ABSOLUTE: 2.09 E9/L (ref 1.5–4)
LYMPHOCYTES RELATIVE PERCENT: 19.5 % (ref 20–42)
MCH RBC QN AUTO: 31.9 PG (ref 26–35)
MCHC RBC AUTO-ENTMCNC: 32.8 % (ref 32–34.5)
MCV RBC AUTO: 97.2 FL (ref 80–99.9)
MONOCYTES ABSOLUTE: 1.04 E9/L (ref 0.1–0.95)
MONOCYTES RELATIVE PERCENT: 9.7 % (ref 2–12)
NEUTROPHILS ABSOLUTE: 7.24 E9/L (ref 1.8–7.3)
NEUTROPHILS RELATIVE PERCENT: 67.5 % (ref 43–80)
PDW BLD-RTO: 14.8 FL (ref 11.5–15)
PLATELET # BLD: 213 E9/L (ref 130–450)
PMV BLD AUTO: 10.3 FL (ref 7–12)
POTASSIUM SERPL-SCNC: 4.1 MMOL/L (ref 3.5–5)
RBC # BLD: 4.95 E12/L (ref 3.5–5.5)
SODIUM BLD-SCNC: 141 MMOL/L (ref 132–146)
TOTAL PROTEIN: 7.2 G/DL (ref 6.4–8.3)
TROPONIN: <0.01 NG/ML (ref 0–0.03)
WBC # BLD: 10.7 E9/L (ref 4.5–11.5)

## 2019-05-01 PROCEDURE — 71045 X-RAY EXAM CHEST 1 VIEW: CPT

## 2019-05-01 PROCEDURE — 2580000003 HC RX 258: Performed by: EMERGENCY MEDICINE

## 2019-05-01 PROCEDURE — 93005 ELECTROCARDIOGRAM TRACING: CPT | Performed by: EMERGENCY MEDICINE

## 2019-05-01 PROCEDURE — 85025 COMPLETE CBC W/AUTO DIFF WBC: CPT

## 2019-05-01 PROCEDURE — 84484 ASSAY OF TROPONIN QUANT: CPT

## 2019-05-01 PROCEDURE — 80053 COMPREHEN METABOLIC PANEL: CPT

## 2019-05-01 PROCEDURE — 99284 EMERGENCY DEPT VISIT MOD MDM: CPT

## 2019-05-01 RX ORDER — 0.9 % SODIUM CHLORIDE 0.9 %
1000 INTRAVENOUS SOLUTION INTRAVENOUS ONCE
Status: COMPLETED | OUTPATIENT
Start: 2019-05-01 | End: 2019-05-01

## 2019-05-01 RX ORDER — SODIUM CHLORIDE 0.9 % (FLUSH) 0.9 %
10 SYRINGE (ML) INJECTION PRN
Status: DISCONTINUED | OUTPATIENT
Start: 2019-05-01 | End: 2019-05-01 | Stop reason: HOSPADM

## 2019-05-01 RX ORDER — THIAMINE HYDROCHLORIDE 100 MG/ML
100 INJECTION, SOLUTION INTRAMUSCULAR; INTRAVENOUS ONCE
Status: DISCONTINUED | OUTPATIENT
Start: 2019-05-01 | End: 2019-05-01

## 2019-05-01 RX ORDER — FOLIC ACID 5 MG/ML
1 INJECTION, SOLUTION INTRAMUSCULAR; INTRAVENOUS; SUBCUTANEOUS ONCE
Status: DISCONTINUED | OUTPATIENT
Start: 2019-05-01 | End: 2019-05-01

## 2019-05-01 RX ADMIN — SODIUM CHLORIDE 1000 ML: 9 INJECTION, SOLUTION INTRAVENOUS at 17:44

## 2019-05-01 ASSESSMENT — ENCOUNTER SYMPTOMS
BLOOD IN STOOL: 0
NAUSEA: 0
VISUAL CHANGE: 0
DIARRHEA: 0

## 2019-05-01 NOTE — ED PROVIDER NOTES
67-year-old female who is known history of alcohol abuse. She had been sober up until March 7. And then states she drank 4 bottles of vodka per week until 5 days ago. She was concerned because she is alert grocery store and became dizzy and then tried to drive to class in [place] and did not feel right she wanted to come for further evaluation at the emergency department. She denies chest pain denies shortness of breath denies nausea vomiting diarrhea or other symptoms. She states she wants to make sure that she is safe and not having acute withdrawals. The history is provided by the patient. Dizziness   Quality:  Lightheadedness  Severity:  Mild  Onset quality:  Gradual  Duration:  2 hours  Timing:  Intermittent  Progression:  Waxing and waning  Chronicity:  New  Context: physical activity    Relieved by:  Nothing  Worsened by:  Nothing  Ineffective treatments:  None tried  Associated symptoms: no blood in stool, no chest pain, no diarrhea, no headaches, no nausea, no palpitations, no syncope, no tinnitus, no vision changes and no weakness        Review of Systems   HENT: Negative for tinnitus. Cardiovascular: Negative for chest pain, palpitations and syncope. Gastrointestinal: Negative for blood in stool, diarrhea and nausea. Neurological: Positive for dizziness. Negative for weakness and headaches. Physical Exam    Procedures    MDM    ED Course as of May 01 1827   Wed May 01, 2019   1826 Patient seen and examined workup was initiated patient's symptoms do not appear to be alcohol withdrawal like symptoms. Orthostatics were checked and found to be negative the patient had reassuring vitals. She was provided him intravenous fluids and patient says symptoms were improved.  She is felt safe for discharge this time and will follow up with her primary care physician.    [CF]      ED Course User Index  [CF] Maricarmen Keys DO     --------------------------------------------- PAST HISTORY ---------------------------------------------  Past Medical History:  has a past medical history of Acute alcohol intoxication ([facility] Acute bronchitis, Alcohol abuse, Anxiety and depression, Benzodiazepine abuse ([facility] Breast lump in female, Corneal abrasion, Ear ache, Elevated glucose level, Elevated liver enzymes, Fatty liver, Fatty liver disease, nonalcoholic, Gastroenteritis, GERD (gastroesophageal reflux disease), Heart palpitations, History of stress test, Hypokalemia, Irregular heartbeat, Migraine, Migraines, Miscarriage, Morbid obesity with BMI of 40.0-44.9, adult ([facility] MRSA infection of labia, MVP (mitral valve prolapse), Panic attacks, PCOS (polycystic ovarian syndrome), Pilonidal cyst, Rapid heart beat, Sinus tachycardia, SOBOE (shortness of breath on exertion), Tobacco abuse, and Vitamin D deficiency. Past Surgical History:  has a past surgical history that includes Tonsillectomy (1991); Breast lumpectomy (Right, 1995); Pilonidal cyst excision (2000); transesophageal echocardiogram (2004); Colonoscopy (11/02/2011); and Upper gastrointestinal endoscopy (N/A, 12/28/2018). Social History:  reports that she has been smoking cigarettes. She has been smoking about 0.50 packs per day. She has never used smokeless tobacco. She reports that she drinks alcohol. She reports that she does not use drugs. Family History: family history includes Breast Cancer (age of onset: 22) in her maternal aunt; COPD in her maternal grandfather; Cancer (age of onset: 36) in her maternal aunt; Cancer (age of onset: 61) in her father; Heart Attack (age of onset: 48) in her paternal grandfather; Heart Attack (age of onset: 54) in her father. The patients home medications have been reviewed.     Allergies: Reglan [metoclopramide]    -------------------------------------------------- RESULTS -------------------------------------------------  Labs:  Results for orders placed or performed during the hospital encounter of 05/01/19   CBC Auto Differential   Result Value Ref Range    WBC 10.7 4.5 - 11.5 E9/L    RBC 4.95 3.50 - 5.50 E12/L    Hemoglobin 15.8 (H) 11.5 - 15.5 g/dL    Hematocrit 48.1 (H) 34.0 - 48.0 %    MCV 97.2 80.0 - 99.9 fL    MCH 31.9 26.0 - 35.0 pg    MCHC 32.8 32.0 - 34.5 %    RDW 14.8 11.5 - 15.0 fL    Platelets 025 471 - 444 E9/L    MPV 10.3 7.0 - 12.0 fL    Neutrophils % 67.5 43.0 - 80.0 %    Immature Granulocytes % 0.7 0.0 - 5.0 %    Lymphocytes % 19.5 (L) 20.0 - 42.0 %    Monocytes % 9.7 2.0 - 12.0 %    Eosinophils % 2.1 0.0 - 6.0 %    Basophils % 0.5 0.0 - 2.0 %    Neutrophils # 7.24 1.80 - 7.30 E9/L    Immature Granulocytes # 0.07 E9/L    Lymphocytes # 2.09 1.50 - 4.00 E9/L    Monocytes # 1.04 (H) 0.10 - 0.95 E9/L    Eosinophils # 0.23 0.05 - 0.50 E9/L    Basophils # 0.05 0.00 - 0.20 E9/L   Comprehensive Metabolic Panel   Result Value Ref Range    Sodium 141 132 - 146 mmol/L    Potassium 4.1 3.5 - 5.0 mmol/L    Chloride 104 98 - 107 mmol/L    CO2 25 22 - 29 mmol/L    Anion Gap 12 7 - 16 mmol/L    Glucose 104 (H) 74 - 99 mg/dL    BUN 12 6 - 20 mg/dL    CREATININE 0.8 0.5 - 1.0 mg/dL    GFR Non-African American >60 >=60 mL/min/1.73    GFR African American >60     Calcium 9.7 8.6 - 10.2 mg/dL    Total Protein 7.2 6.4 - 8.3 g/dL    Alb 4.4 3.5 - 5.2 g/dL    Total Bilirubin 0.6 0.0 - 1.2 mg/dL    Alkaline Phosphatase 70 35 - 104 U/L    ALT 78 (H) 0 - 32 U/L    AST 40 (H) 0 - 31 U/L   Troponin   Result Value Ref Range    Troponin <0.01 0.00 - 0.03 ng/mL   EKG 12 Lead   Result Value Ref Range    Ventricular Rate 76 BPM    Atrial Rate 76 BPM    P-R Interval 148 ms    QRS Duration 80 ms    Q-T Interval 394 ms    QTc Calculation (Bazett) 443 ms    P Axis -5 degrees    R Axis 21 degrees    T Axis 9 degrees       Radiology:  XR CHEST PORTABLE   Final Result   Normal chest.          ------------------------- NURSING NOTES AND VITALS REVIEWED ---------------------------  Date / Time Roomed:  5/1/2019  4:44 PM  ED Bed

## 2019-05-01 NOTE — LETTER
[facility] Emergency Department  [address] [place] [id number]  Phone: [phone]               May 1, 2019    Patient: Cassie Hawkins   YOB: 1980   Date of Visit: 5/1/2019       To Whom It May Concern:    Laurice Dakins was seen and treated in our emergency department on 5/1/2019. She may return to school on 5/2/19.       Sincerely,       Lavone Sandifer, DO         Signature:__________________________________

## 2019-05-02 LAB
EKG ATRIAL RATE: 76 BPM
EKG P AXIS: -5 DEGREES
EKG P-R INTERVAL: 148 MS
EKG Q-T INTERVAL: 394 MS
EKG QRS DURATION: 80 MS
EKG QTC CALCULATION (BAZETT): 443 MS
EKG R AXIS: 21 DEGREES
EKG T AXIS: 9 DEGREES
EKG VENTRICULAR RATE: 76 BPM

## 2019-05-02 PROCEDURE — 93010 ELECTROCARDIOGRAM REPORT: CPT | Performed by: INTERNAL MEDICINE

## 2019-05-10 ENCOUNTER — OFFICE VISIT (OUTPATIENT)
Dept: CARDIOLOGY CLINIC | Age: 39
End: 2019-05-10
Payer: COMMERCIAL

## 2019-05-10 VITALS
WEIGHT: 292.8 LBS | HEART RATE: 81 BPM | SYSTOLIC BLOOD PRESSURE: 120 MMHG | DIASTOLIC BLOOD PRESSURE: 60 MMHG | HEIGHT: 68 IN | BODY MASS INDEX: 44.38 KG/M2 | RESPIRATION RATE: 16 BRPM

## 2019-05-10 DIAGNOSIS — R00.0 SINUS TACHYCARDIA: ICD-10-CM

## 2019-05-10 DIAGNOSIS — I49.3 PVC (PREMATURE VENTRICULAR CONTRACTION): ICD-10-CM

## 2019-05-10 DIAGNOSIS — I51.9 HEART PROBLEM: Primary | ICD-10-CM

## 2019-05-10 PROCEDURE — 4004F PT TOBACCO SCREEN RCVD TLK: CPT | Performed by: INTERNAL MEDICINE

## 2019-05-10 PROCEDURE — 93000 ELECTROCARDIOGRAM COMPLETE: CPT | Performed by: INTERNAL MEDICINE

## 2019-05-10 PROCEDURE — G8417 CALC BMI ABV UP PARAM F/U: HCPCS | Performed by: INTERNAL MEDICINE

## 2019-05-10 PROCEDURE — G8427 DOCREV CUR MEDS BY ELIG CLIN: HCPCS | Performed by: INTERNAL MEDICINE

## 2019-05-10 PROCEDURE — 99204 OFFICE O/P NEW MOD 45 MIN: CPT | Performed by: INTERNAL MEDICINE

## 2019-05-10 RX ORDER — METOPROLOL TARTRATE 75 MG/1
TABLET, FILM COATED ORAL
Qty: 270 TABLET | Refills: 3 | Status: SHIPPED | OUTPATIENT
Start: 2019-05-10

## 2019-05-10 NOTE — PROGRESS NOTES
CHIEF COMPLAINT: Pre-op    HISTORY OF PRESENT ILLNESS: Patient is a 41 y.o. female seen at the request of No primary care provider on file. .      For bariatric surgery. No CP or SOB.      Past Medical History:   Diagnosis Date    Acute alcohol intoxication ([address]     Acute bronchitis     Alcohol abuse     Anxiety and depression     Benzodiazepine abuse ([address]     Breast lump in female     Right    Corneal abrasion     Ear ache     Elevated glucose level 11/3/2016    Elevated liver enzymes     Fatty liver 11/23/2016    From ultrasound of 11/23/16    Fatty liver disease, nonalcoholic 81/76/6503    Gastroenteritis     GERD (gastroesophageal reflux disease) 2010    Heart palpitations     History of stress test 02/08/2018    Hypokalemia     Irregular heartbeat     Migraine     Migraines     With Menstruation cycles    Miscarriage 2012    Morbid obesity with BMI of 40.0-44.9, adult ([address]     MRSA infection of labia 2011    MVP (mitral valve prolapse) 2004    per [facility]    Panic attacks     PCOS (polycystic ovarian syndrome) 2008    Pilonidal cyst     Rapid heart beat     Sinus tachycardia     SOBOE (shortness of breath on exertion)     Tobacco abuse     Vitamin D deficiency        Patient Active Problem List   Diagnosis    Tobacco abuse    Anxiety and depression    Sinus tachycardia    Morbid obesity with BMI of 40.0-44.9, adult (HCC)    Alcohol abuse    GERD (gastroesophageal reflux disease)    Elevated LFTs    PVC (premature ventricular contraction)    PAC (premature atrial contraction)    Fatty liver    Severe episode of recurrent major depressive disorder, without psychotic features ([address]    Pain, dental    Generalized pain    Elevated glucose    Mixed hyperlipidemia    Depression    HTN (hypertension)    Major depressive disorder, recurrent, severe w/o psychotic behavior ([address]    Hypokalemia    History of migraine headaches    Vitamin D deficiency    Psoriasis of vulva    Vitamin D deficiency       Allergies   Allergen Reactions    Reglan [Metoclopramide]      Makes pt feel intense rage       Current Outpatient Medications   Medication Sig Dispense Refill    Cholecalciferol (VITAMIN D3) 99086 units CAPS Take one capsule weekly for 12 weeks 12 capsule 0    clocortolone pivalate (CLODERM) 0.1 % CREA Apply bid for 2 weeks then qd for 2 weeks; 1 Tube 1    NONFORMULARY Take by mouth daily Oil of Oregano       eletriptan (RELPAX) 40 MG tablet Take 1 tablet by mouth once as needed (migraine) may repeat in 2 hours if necessary. 6 tablet 1    metoprolol tartrate (LOPRESSOR) 50 MG tablet TAKE 1 TABLET BY MOUTH TWICE DAILY 60 tablet 6    clonazePAM (KLONOPIN) 1 MG tablet TK 1 T PO BID PRN  0     No current facility-administered medications for this visit. Social History     Socioeconomic History    Marital status:      Spouse name: Not on file    Number of children: Not on file    Years of education: Not on file    Highest education level: Not on file   Occupational History    Occupation: nursing student     Comment: Going to school for RN in [employer] [address] resource strain: Not on file    Food insecurity:     Worry: Not on file     Inability: Not on file   [employer] needs:     Medical: Not on file     Non-medical: Not on file   Tobacco Use    Smoking status: Current Every Day Smoker     Packs/day: 0.50     Types: Cigarettes    Smokeless tobacco: Never Used    Tobacco comment: started smoking age 14 and smoked up to 2 ppd since   Substance and Sexual Activity    Alcohol use:  Yes     Alcohol/week: 0.0 oz     Comment: vodka, 3 bottles a week    Drug use: No     Comment: denies    Sexual activity: Yes     Partners: Male   Lifestyle    Physical activity:     Days per week: Not on file     Minutes per session: Not on file    Stress: Not on file   Relationships    Social connections:     Talks on phone: Not on file Gets together: Not on file     Attends [employer] service: Not on file     Active member of club or organization: Not on file     Attends meetings of clubs or organizations: Not on file     Relationship status: Not on file    Intimate partner violence:     Fear of current or ex partner: Not on file     Emotionally abused: Not on file     Physically abused: Not on file     Forced sexual activity: Not on file   Other Topics Concern    Not on file   Social History Narrative    Lives with daughter, son in [place] for nursing school       Family History   Problem Relation Age of Onset    Heart Attack Father 54    Cancer Father 61        Lung    Heart Attack Paternal Grandfather 48    Cancer Maternal Aunt 36        Bone    Breast Cancer Maternal Aunt 22    COPD Maternal Grandfather        Review of Systems:  Heart: as above   Lungs: as above   Eyes: denies changes in vision or discharge. Ears: denies changes in hearing or pain. Nose: denies epistaxis or masses   Throat: denies sore throat or trouble swallowing. Neuro: denies numbness, tingling, tremors. Skin: denies rashes or itching. : denies hematuria, dysuria   GI: denies vomiting, diarrhea   Psych: denies mood changed, anxiety, depression. All other systems negative. Physical Exam   /60   Pulse 81   Resp 16   Ht 5' 8\" (1.727 m)   Wt 292 lb 12.8 oz (132.8 kg)   BMI 44.52 kg/m²   Constitutional: Oriented to person, place, and time. Well-developed and well-nourished. No distress. Head: Normocephalic and atraumatic. Eyes: EOM are normal. Pupils are equal, round, and reactive to light. Neck: Normal range of motion. Neck supple. No hepatojugular reflux and no JVD present. Carotid bruit is not present. No tracheal deviation present. No thyromegaly present. Cardiovascular: Normal rate, regular rhythm, normal heart sounds and intact distal pulses. Exam reveals no gallop and no friction rub. No murmur heard.   Pulmonary/Chest: Effort normal and breath sounds normal. No respiratory distress. No wheezes. No rales. No tenderness. Abdominal: Soft. Bowel sounds are normal. No distension and no mass. No tenderness. No rebound and no guarding. Musculoskeletal: Normal range of motion. No edema and no tenderness. Lymphadenopathy:   No cervical adenopathy. No groin adenopathy. Neurological: Alert and oriented to person, place, and time. Skin: Skin is warm and dry. No rash noted. Not diaphoretic. No erythema. Psychiatric: Normal mood and affect. Behavior is normal.     EKG:  normal sinus rhythm, nonspecific ST and T waves changes. ASSESSMENT AND PLAN:  Patient Active Problem List   Diagnosis    Tobacco abuse    Anxiety and depression    Sinus tachycardia    Morbid obesity with BMI of 40.0-44.9, adult ([address]    Alcohol abuse    GERD (gastroesophageal reflux disease)    Elevated LFTs    PVC (premature ventricular contraction)    PAC (premature atrial contraction)    Fatty liver    Severe episode of recurrent major depressive disorder, without psychotic features ([address]    Pain, dental    Generalized pain    Elevated glucose    Mixed hyperlipidemia    Depression    HTN (hypertension)    Major depressive disorder, recurrent, severe w/o psychotic behavior ([address]    Hypokalemia    History of migraine headaches    Vitamin D deficiency    Psoriasis of vulva    Vitamin D deficiency     1. Pre-op: Asymptomatic. Normal stress 2/8/18. Patient is an acceptable risk to proceed without further cardiac testing. 2. Hx MVP: Stable. 3. HTN: Observe. Ayala Sparks D.O.   Cardiologist  Cardiology, [facility]

## 2019-05-15 ENCOUNTER — HOSPITAL ENCOUNTER (EMERGENCY)
Age: 39
Discharge: HOME | End: 2019-05-15
Attending: EMERGENCY MEDICINE
Payer: COMMERCIAL

## 2019-05-15 VITALS
HEIGHT: 68 IN | HEART RATE: 86 BPM | TEMPERATURE: 98 F | BODY MASS INDEX: 43.8 KG/M2 | WEIGHT: 289 LBS | DIASTOLIC BLOOD PRESSURE: 76 MMHG | SYSTOLIC BLOOD PRESSURE: 112 MMHG | RESPIRATION RATE: 16 BRPM | OXYGEN SATURATION: 96 %

## 2019-05-15 DIAGNOSIS — K52.9 GASTROENTERITIS: Primary | ICD-10-CM

## 2019-05-15 DIAGNOSIS — R19.7 NAUSEA VOMITING AND DIARRHEA: ICD-10-CM

## 2019-05-15 DIAGNOSIS — R11.2 NAUSEA VOMITING AND DIARRHEA: ICD-10-CM

## 2019-05-15 LAB
ALBUMIN SERPL-MCNC: 4.1 G/DL (ref 3.5–5.2)
ALP BLD-CCNC: 67 U/L (ref 35–104)
ALT SERPL-CCNC: 43 U/L (ref 0–32)
ANION GAP SERPL CALCULATED.3IONS-SCNC: 10 MMOL/L (ref 7–16)
AST SERPL-CCNC: 38 U/L (ref 0–31)
BASOPHILS ABSOLUTE: 0.04 E9/L (ref 0–0.2)
BASOPHILS RELATIVE PERCENT: 0.4 % (ref 0–2)
BILIRUB SERPL-MCNC: 0.6 MG/DL (ref 0–1.2)
BILIRUBIN URINE: NEGATIVE
BLOOD, URINE: NEGATIVE
BUN BLDV-MCNC: 10 MG/DL (ref 6–20)
CALCIUM SERPL-MCNC: 9.4 MG/DL (ref 8.6–10.2)
CHLORIDE BLD-SCNC: 104 MMOL/L (ref 98–107)
CLARITY: CLEAR
CO2: 25 MMOL/L (ref 22–29)
COLOR: YELLOW
CREAT SERPL-MCNC: 0.9 MG/DL (ref 0.5–1)
EOSINOPHILS ABSOLUTE: 0.23 E9/L (ref 0.05–0.5)
EOSINOPHILS RELATIVE PERCENT: 2.3 % (ref 0–6)
GFR AFRICAN AMERICAN: >60
GFR NON-AFRICAN AMERICAN: >60 ML/MIN/1.73
GLUCOSE BLD-MCNC: 102 MG/DL (ref 74–99)
GLUCOSE URINE: NEGATIVE MG/DL
HCG, URINE, POC: NEGATIVE
HCT VFR BLD CALC: 47.5 % (ref 34–48)
HEMOGLOBIN: 16.1 G/DL (ref 11.5–15.5)
IMMATURE GRANULOCYTES #: 0.06 E9/L
IMMATURE GRANULOCYTES %: 0.6 % (ref 0–5)
KETONES, URINE: NEGATIVE MG/DL
LEUKOCYTE ESTERASE, URINE: NEGATIVE
LIPASE: 27 U/L (ref 13–60)
LYMPHOCYTES ABSOLUTE: 2.4 E9/L (ref 1.5–4)
LYMPHOCYTES RELATIVE PERCENT: 23.8 % (ref 20–42)
Lab: NORMAL
MCH RBC QN AUTO: 32.4 PG (ref 26–35)
MCHC RBC AUTO-ENTMCNC: 33.9 % (ref 32–34.5)
MCV RBC AUTO: 95.6 FL (ref 80–99.9)
MONOCYTES ABSOLUTE: 0.81 E9/L (ref 0.1–0.95)
MONOCYTES RELATIVE PERCENT: 8 % (ref 2–12)
NEGATIVE QC PASS/FAIL: NORMAL
NEUTROPHILS ABSOLUTE: 6.56 E9/L (ref 1.8–7.3)
NEUTROPHILS RELATIVE PERCENT: 64.9 % (ref 43–80)
NITRITE, URINE: NEGATIVE
PDW BLD-RTO: 14.1 FL (ref 11.5–15)
PH UA: 6 (ref 5–9)
PLATELET # BLD: 233 E9/L (ref 130–450)
PMV BLD AUTO: 10.2 FL (ref 7–12)
POSITIVE QC PASS/FAIL: NORMAL
POTASSIUM SERPL-SCNC: 4.8 MMOL/L (ref 3.5–5)
PROTEIN UA: NEGATIVE MG/DL
RBC # BLD: 4.97 E12/L (ref 3.5–5.5)
SODIUM BLD-SCNC: 139 MMOL/L (ref 132–146)
SPECIFIC GRAVITY UA: 1.02 (ref 1–1.03)
TOTAL PROTEIN: 7 G/DL (ref 6.4–8.3)
UROBILINOGEN, URINE: 0.2 E.U./DL
WBC # BLD: 10.1 E9/L (ref 4.5–11.5)

## 2019-05-15 PROCEDURE — 2580000003 HC RX 258: Performed by: EMERGENCY MEDICINE

## 2019-05-15 PROCEDURE — 83690 ASSAY OF LIPASE: CPT

## 2019-05-15 PROCEDURE — 96374 THER/PROPH/DIAG INJ IV PUSH: CPT

## 2019-05-15 PROCEDURE — 6360000002 HC RX W HCPCS: Performed by: EMERGENCY MEDICINE

## 2019-05-15 PROCEDURE — 81003 URINALYSIS AUTO W/O SCOPE: CPT

## 2019-05-15 PROCEDURE — 36415 COLL VENOUS BLD VENIPUNCTURE: CPT

## 2019-05-15 PROCEDURE — 80053 COMPREHEN METABOLIC PANEL: CPT

## 2019-05-15 PROCEDURE — 99284 EMERGENCY DEPT VISIT MOD MDM: CPT

## 2019-05-15 PROCEDURE — 85025 COMPLETE CBC W/AUTO DIFF WBC: CPT

## 2019-05-15 RX ORDER — ONDANSETRON 4 MG/1
4 TABLET, FILM COATED ORAL EVERY 8 HOURS PRN
Qty: 12 TABLET | Refills: 0 | Status: SHIPPED | OUTPATIENT
Start: 2019-05-15 | End: 2019-05-20

## 2019-05-15 RX ORDER — DICYCLOMINE HYDROCHLORIDE 10 MG/1
10 CAPSULE ORAL
Qty: 12 CAPSULE | Refills: 0 | Status: SHIPPED | OUTPATIENT
Start: 2019-05-15 | End: 2019-07-07

## 2019-05-15 RX ORDER — 0.9 % SODIUM CHLORIDE 0.9 %
1000 INTRAVENOUS SOLUTION INTRAVENOUS ONCE
Status: COMPLETED | OUTPATIENT
Start: 2019-05-15 | End: 2019-05-15

## 2019-05-15 RX ORDER — ONDANSETRON 2 MG/ML
4 INJECTION INTRAMUSCULAR; INTRAVENOUS ONCE
Status: COMPLETED | OUTPATIENT
Start: 2019-05-15 | End: 2019-05-15

## 2019-05-15 RX ADMIN — SODIUM CHLORIDE 1000 ML: 9 INJECTION, SOLUTION INTRAVENOUS at 10:51

## 2019-05-15 RX ADMIN — ONDANSETRON 4 MG: 2 INJECTION INTRAMUSCULAR; INTRAVENOUS at 10:51

## 2019-05-15 NOTE — ED PROVIDER NOTES
6.0 %    Basophils % 0.4 0.0 - 2.0 %    Neutrophils # 6.56 1.80 - 7.30 E9/L    Immature Granulocytes # 0.06 E9/L    Lymphocytes # 2.40 1.50 - 4.00 E9/L    Monocytes # 0.81 0.10 - 0.95 E9/L    Eosinophils # 0.23 0.05 - 0.50 E9/L    Basophils # 0.04 0.00 - 0.20 E9/L   Urinalysis   Result Value Ref Range    Color, UA Yellow Straw/Yellow    Clarity, UA Clear Clear    Glucose, Ur Negative Negative mg/dL    Bilirubin Urine Negative Negative    Ketones, Urine Negative Negative mg/dL    Specific Gravity, UA 1.020 1.005 - 1.030    Blood, Urine Negative Negative    pH, UA 6.0 5.0 - 9.0    Protein, UA Negative Negative mg/dL    Urobilinogen, Urine 0.2 <2.0 E.U./dL    Nitrite, Urine Negative Negative    Leukocyte Esterase, Urine Negative Negative   POC Pregnancy Urine Qual   Result Value Ref Range    HCG, Urine, POC Negative Negative    Lot Number [id number]     Positive QC Pass/Fail Pass     Negative QC Pass/Fail Pass        RADIOLOGY:  Interpreted by Radiologist.  No orders to display       ------------------------- NURSING NOTES AND VITALS REVIEWED ---------------------------   The nursing notes within the ED encounter and vital signs as below have been reviewed. /76   Pulse 86   Temp 98 °F (36.7 °C) (Oral)   Resp 16   Ht 5' 8\" (1.727 m)   Wt 289 lb (131.1 kg)   SpO2 96%   BMI 43.94 kg/m²   Oxygen Saturation Interpretation: Normal      ---------------------------------------------------PHYSICAL EXAM--------------------------------------      Constitutional/General: Alert and oriented x3, well appearing, non toxic in NAD  Head: NC/AT  Eyes: PERRL, EOMI  Mouth: Oropharynx clear, handling secretions, no trismus  Neck: Supple, full ROM, no meningeal signs  Pulmonary: Lungs clear to auscultation bilaterally, no wheezes, rales, or rhonchi. Not in respiratory distress  Cardiovascular:  Regular rate and rhythm, no murmurs, gallops, or rubs.  2+ distal pulses  Abdomen: Soft, non tender, non distended,   Extremities: Moves all extremities x 4. Warm and well perfused  Skin: warm and dry without rash  Neurologic: GCS 15,  Psych: Normal Affect      ------------------------------ ED COURSE/MEDICAL DECISION MAKING----------------------  Medications   0.9 % sodium chloride bolus (0 mLs Intravenous Stopped 5/15/19 1141)   ondansetron (ZOFRAN) injection 4 mg (4 mg Intravenous Given 5/15/19 1051)         Medical Decision Making:    Review of labs and reexamination of the patient after receiving IV fluids and Zofran. Reexamination 11:45 AM patient doing well patient did not vomit in the ER patient would like to be discharged. We discharged on Zofran and Bentyl  Counseling: The emergency provider has spoken with the patient and discussed todays results, in addition to providing specific details for the plan of care and counseling regarding the diagnosis and prognosis. Questions are answered at this time and they are agreeable with the plan.      --------------------------------- IMPRESSION AND DISPOSITION ---------------------------------    IMPRESSION  1. Gastroenteritis    2.  Nausea vomiting and diarrhea        DISPOSITION  Disposition: Discharge to home  Patient condition is fair                  Audelia Geller MD  05/15/19 7534

## 2019-05-28 ENCOUNTER — TELEPHONE (OUTPATIENT)
Dept: BARIATRICS/WEIGHT MGMT | Age: 39
End: 2019-05-28

## 2019-05-28 NOTE — TELEPHONE ENCOUNTER
Rd / Ld called patient due to patient being a no show for his / her scheduled 5th dietary appointment on 5/21/19. Patient stated she has Shingles. Hugo Ld rescheduled pt for 6/18/19 at 1:30.

## 2019-07-03 ENCOUNTER — TELEPHONE (OUTPATIENT)
Dept: BARIATRICS/WEIGHT MGMT | Age: 39
End: 2019-07-03

## 2019-07-07 ENCOUNTER — HOSPITAL ENCOUNTER (EMERGENCY)
Age: 39
Discharge: HOME | End: 2019-07-07
Attending: EMERGENCY MEDICINE
Payer: COMMERCIAL

## 2019-07-07 VITALS
BODY MASS INDEX: 43.95 KG/M2 | WEIGHT: 290 LBS | TEMPERATURE: 98.1 F | SYSTOLIC BLOOD PRESSURE: 134 MMHG | HEART RATE: 77 BPM | OXYGEN SATURATION: 96 % | RESPIRATION RATE: 16 BRPM | DIASTOLIC BLOOD PRESSURE: 88 MMHG | HEIGHT: 68 IN

## 2019-07-07 DIAGNOSIS — F41.1 ANXIETY STATE: Primary | ICD-10-CM

## 2019-07-07 PROCEDURE — 6370000000 HC RX 637 (ALT 250 FOR IP): Performed by: NURSE PRACTITIONER

## 2019-07-07 PROCEDURE — 6370000000 HC RX 637 (ALT 250 FOR IP): Performed by: EMERGENCY MEDICINE

## 2019-07-07 PROCEDURE — 99283 EMERGENCY DEPT VISIT LOW MDM: CPT

## 2019-07-07 RX ORDER — POTASSIUM CHLORIDE 20 MEQ/1
20 TABLET, EXTENDED RELEASE ORAL ONCE
Status: COMPLETED | OUTPATIENT
Start: 2019-07-07 | End: 2019-07-07

## 2019-07-07 RX ORDER — ALPRAZOLAM 1 MG/1
1 TABLET ORAL PRN
COMMUNITY

## 2019-07-07 RX ORDER — ACETAMINOPHEN 500 MG
1000 TABLET ORAL ONCE
Status: COMPLETED | OUTPATIENT
Start: 2019-07-07 | End: 2019-07-07

## 2019-07-07 RX ORDER — ALPRAZOLAM 1 MG/1
1 TABLET ORAL ONCE
Status: COMPLETED | OUTPATIENT
Start: 2019-07-07 | End: 2019-07-07

## 2019-07-07 RX ORDER — HYDROXYZINE PAMOATE 25 MG/1
25 CAPSULE ORAL 3 TIMES DAILY PRN
Qty: 15 CAPSULE | Refills: 0 | Status: SHIPPED | OUTPATIENT
Start: 2019-07-07 | End: 2019-07-21

## 2019-07-07 RX ADMIN — ACETAMINOPHEN 1000 MG: 500 TABLET ORAL at 05:02

## 2019-07-07 RX ADMIN — ALPRAZOLAM 1 MG: 1 TABLET ORAL at 03:55

## 2019-07-07 RX ADMIN — POTASSIUM CHLORIDE 20 MEQ: 20 TABLET, EXTENDED RELEASE ORAL at 04:15

## 2019-07-07 ASSESSMENT — PAIN SCALES - GENERAL: PAINLEVEL_OUTOF10: 5

## 2019-07-07 NOTE — ED PROVIDER NOTES
EOMI  Mouth: Oropharynx clear, handling secretions, no trismus  Neck: Supple, full ROM, no meningeal signs  Pulmonary: Lungs clear to auscultation bilaterally, no wheezes, rales, or rhonchi. Not in respiratory distress  Cardiovascular:  Regular rate and rhythm, no murmurs, gallops, or rubs. 2+ distal pulses  Abdomen: Soft, non tender, non distended,   Extremities: Moves all extremities x 4. Warm and well perfused  Skin: warm and dry without rash  Neurologic: GCS 15,  Psych: Normal Affect, anxious      ------------------------------ ED COURSE/MEDICAL DECISION MAKING----------------------  Medications   ALPRAZolam (XANAX) tablet 1 mg (1 mg Oral Given 7/7/19 0215)   potassium chloride (KLOR-CON M) extended release tablet 20 mEq (20 mEq Oral Given 7/7/19 2405)   acetaminophen (TYLENOL) tablet 1,000 mg (1,000 mg Oral Given 7/7/19 0502)         Medical Decision Making: Will be medicated and continue to watch and re-evaluate. 0400- sign out to Dr. Sarkis Martinez, awaiting re-evaluate    Counseling: The emergency provider has spoken with the patient and discussed todays results, in addition to providing specific details for the plan of care and counseling regarding the diagnosis and prognosis. Questions are answered at this time and they are agreeable with the plan.      --------------------------------- IMPRESSION AND DISPOSITION ---------------------------------    IMPRESSION  1.  Anxiety state        DISPOSITION  Disposition: Discharge to home  Patient condition is good                 KORY Blanc CNP  07/07/19 [address] KORY Paul CNP  07/07/19 [id number]

## 2019-07-07 NOTE — ED NOTES
Pt stated that she overdrank this past week and that she usually takes potassium when she does this.  Sly Teresa NP.     Kaz Delgado RN  07/07/19 0158

## 2019-09-01 ENCOUNTER — APPOINTMENT (OUTPATIENT)
Dept: GENERAL RADIOLOGY | Age: 39
End: 2019-09-01
Payer: COMMERCIAL

## 2019-09-01 ENCOUNTER — HOSPITAL ENCOUNTER (EMERGENCY)
Age: 39
Discharge: HOME | End: 2019-09-01
Payer: COMMERCIAL

## 2019-09-01 VITALS
WEIGHT: 293 LBS | HEART RATE: 106 BPM | HEIGHT: 68 IN | OXYGEN SATURATION: 98 % | SYSTOLIC BLOOD PRESSURE: 171 MMHG | BODY MASS INDEX: 44.41 KG/M2 | RESPIRATION RATE: 16 BRPM | TEMPERATURE: 98.4 F | DIASTOLIC BLOOD PRESSURE: 87 MMHG

## 2019-09-01 DIAGNOSIS — J02.9 VIRAL PHARYNGITIS: Primary | ICD-10-CM

## 2019-09-01 DIAGNOSIS — S80.211A ABRASION OF RIGHT KNEE, INITIAL ENCOUNTER: ICD-10-CM

## 2019-09-01 DIAGNOSIS — S83.91XS SPRAIN OF RIGHT KNEE, UNSPECIFIED LIGAMENT, SEQUELA: ICD-10-CM

## 2019-09-01 LAB — STREP GRP A PCR: NEGATIVE

## 2019-09-01 PROCEDURE — 73562 X-RAY EXAM OF KNEE 3: CPT

## 2019-09-01 PROCEDURE — 87880 STREP A ASSAY W/OPTIC: CPT

## 2019-09-01 PROCEDURE — 99283 EMERGENCY DEPT VISIT LOW MDM: CPT

## 2019-09-01 RX ORDER — IBUPROFEN 800 MG/1
800 TABLET ORAL EVERY 6 HOURS PRN
Qty: 20 TABLET | Refills: 0 | Status: SHIPPED | OUTPATIENT
Start: 2019-09-01 | End: 2020-01-18 | Stop reason: ALTCHOICE

## 2019-09-01 RX ORDER — IBUPROFEN 800 MG/1
800 TABLET ORAL ONCE
Status: DISCONTINUED | OUTPATIENT
Start: 2019-09-01 | End: 2019-09-02 | Stop reason: HOSPADM

## 2019-09-03 ENCOUNTER — TELEPHONE (OUTPATIENT)
Dept: ORTHOPEDIC SURGERY | Age: 39
End: 2019-09-03

## 2019-09-23 ENCOUNTER — OFFICE VISIT (OUTPATIENT)
Dept: ORTHOPEDIC SURGERY | Age: 39
End: 2019-09-23
Payer: COMMERCIAL

## 2019-09-23 VITALS
HEART RATE: 93 BPM | HEIGHT: 68 IN | SYSTOLIC BLOOD PRESSURE: 142 MMHG | DIASTOLIC BLOOD PRESSURE: 99 MMHG | WEIGHT: 293 LBS | BODY MASS INDEX: 44.41 KG/M2

## 2019-09-23 DIAGNOSIS — M25.562 LEFT KNEE PAIN, UNSPECIFIED CHRONICITY: Primary | ICD-10-CM

## 2019-09-23 PROCEDURE — 99213 OFFICE O/P EST LOW 20 MIN: CPT | Performed by: ORTHOPAEDIC SURGERY

## 2019-09-23 PROCEDURE — G8417 CALC BMI ABV UP PARAM F/U: HCPCS | Performed by: ORTHOPAEDIC SURGERY

## 2019-09-23 PROCEDURE — G8428 CUR MEDS NOT DOCUMENT: HCPCS | Performed by: ORTHOPAEDIC SURGERY

## 2019-09-23 PROCEDURE — 4004F PT TOBACCO SCREEN RCVD TLK: CPT | Performed by: ORTHOPAEDIC SURGERY

## 2019-09-23 NOTE — LETTER
[facility]  [address] [place] [address]  Phone: [phone]  Fax: [phone]    Michael Levy MD        September 23, 2019     Patient: Jigna Ascencio   YOB: 1980   Date of Visit: 9/23/2019       To Whom It May Concern: It is my medical opinion that Eloise Pete Can proceed with clinical rotations. She has no limitations in terms of her left knee. If you have any questions or concerns, please don't hesitate to call.     Sincerely,          Michael Levy MD

## 2019-11-05 ENCOUNTER — HOSPITAL ENCOUNTER (EMERGENCY)
Age: 39
Discharge: HOME | End: 2019-11-05

## 2019-11-05 VITALS
HEART RATE: 90 BPM | RESPIRATION RATE: 16 BRPM | TEMPERATURE: 98.6 F | SYSTOLIC BLOOD PRESSURE: 124 MMHG | OXYGEN SATURATION: 97 % | WEIGHT: 293 LBS | BODY MASS INDEX: 44.41 KG/M2 | DIASTOLIC BLOOD PRESSURE: 81 MMHG | HEIGHT: 68 IN

## 2019-11-05 DIAGNOSIS — R51.9 NONINTRACTABLE HEADACHE, UNSPECIFIED CHRONICITY PATTERN, UNSPECIFIED HEADACHE TYPE: Primary | ICD-10-CM

## 2019-11-05 PROCEDURE — 96361 HYDRATE IV INFUSION ADD-ON: CPT

## 2019-11-05 PROCEDURE — 96374 THER/PROPH/DIAG INJ IV PUSH: CPT

## 2019-11-05 PROCEDURE — 96375 TX/PRO/DX INJ NEW DRUG ADDON: CPT

## 2019-11-05 PROCEDURE — 6360000002 HC RX W HCPCS: Performed by: PHYSICIAN ASSISTANT

## 2019-11-05 PROCEDURE — 2580000003 HC RX 258: Performed by: PHYSICIAN ASSISTANT

## 2019-11-05 PROCEDURE — 99283 EMERGENCY DEPT VISIT LOW MDM: CPT

## 2019-11-05 RX ORDER — ONDANSETRON 2 MG/ML
4 INJECTION INTRAMUSCULAR; INTRAVENOUS ONCE
Status: COMPLETED | OUTPATIENT
Start: 2019-11-05 | End: 2019-11-05

## 2019-11-05 RX ORDER — KETOROLAC TROMETHAMINE 30 MG/ML
30 INJECTION, SOLUTION INTRAMUSCULAR; INTRAVENOUS ONCE
Status: COMPLETED | OUTPATIENT
Start: 2019-11-05 | End: 2019-11-05

## 2019-11-05 RX ORDER — SODIUM CHLORIDE 9 MG/ML
1000 INJECTION, SOLUTION INTRAVENOUS ONCE
Status: COMPLETED | OUTPATIENT
Start: 2019-11-05 | End: 2019-11-05

## 2019-11-05 RX ORDER — ONDANSETRON 4 MG/1
4 TABLET, ORALLY DISINTEGRATING ORAL EVERY 8 HOURS PRN
Qty: 24 TABLET | Refills: 0 | Status: SHIPPED | OUTPATIENT
Start: 2019-11-05 | End: 2019-12-14

## 2019-11-05 RX ADMIN — KETOROLAC TROMETHAMINE 30 MG: 30 INJECTION, SOLUTION INTRAMUSCULAR; INTRAVENOUS at 14:32

## 2019-11-05 RX ADMIN — SODIUM CHLORIDE 1000 ML: 9 INJECTION, SOLUTION INTRAVENOUS at 14:32

## 2019-11-05 RX ADMIN — ONDANSETRON 4 MG: 2 INJECTION INTRAMUSCULAR; INTRAVENOUS at 14:32

## 2019-11-05 ASSESSMENT — PAIN SCALES - GENERAL
PAINLEVEL_OUTOF10: 8
PAINLEVEL_OUTOF10: 2

## 2019-12-14 ENCOUNTER — HOSPITAL ENCOUNTER (EMERGENCY)
Age: 39
Discharge: HOME | End: 2019-12-14
Attending: EMERGENCY MEDICINE

## 2019-12-14 VITALS
HEART RATE: 89 BPM | BODY MASS INDEX: 44.41 KG/M2 | DIASTOLIC BLOOD PRESSURE: 64 MMHG | HEIGHT: 68 IN | SYSTOLIC BLOOD PRESSURE: 109 MMHG | WEIGHT: 293 LBS | TEMPERATURE: 98.5 F | OXYGEN SATURATION: 98 % | RESPIRATION RATE: 16 BRPM

## 2019-12-14 DIAGNOSIS — G43.809 OTHER MIGRAINE WITHOUT STATUS MIGRAINOSUS, NOT INTRACTABLE: Primary | ICD-10-CM

## 2019-12-14 PROCEDURE — 96375 TX/PRO/DX INJ NEW DRUG ADDON: CPT

## 2019-12-14 PROCEDURE — 6360000002 HC RX W HCPCS: Performed by: STUDENT IN AN ORGANIZED HEALTH CARE EDUCATION/TRAINING PROGRAM

## 2019-12-14 PROCEDURE — 96374 THER/PROPH/DIAG INJ IV PUSH: CPT

## 2019-12-14 PROCEDURE — 99283 EMERGENCY DEPT VISIT LOW MDM: CPT

## 2019-12-14 PROCEDURE — 2580000003 HC RX 258: Performed by: STUDENT IN AN ORGANIZED HEALTH CARE EDUCATION/TRAINING PROGRAM

## 2019-12-14 RX ORDER — PROCHLORPERAZINE EDISYLATE 5 MG/ML
10 INJECTION INTRAMUSCULAR; INTRAVENOUS ONCE
Status: COMPLETED | OUTPATIENT
Start: 2019-12-14 | End: 2019-12-14

## 2019-12-14 RX ORDER — 0.9 % SODIUM CHLORIDE 0.9 %
1000 INTRAVENOUS SOLUTION INTRAVENOUS ONCE
Status: COMPLETED | OUTPATIENT
Start: 2019-12-14 | End: 2019-12-14

## 2019-12-14 RX ORDER — KETOROLAC TROMETHAMINE 30 MG/ML
15 INJECTION, SOLUTION INTRAMUSCULAR; INTRAVENOUS ONCE
Status: COMPLETED | OUTPATIENT
Start: 2019-12-14 | End: 2019-12-14

## 2019-12-14 RX ORDER — DIPHENHYDRAMINE HYDROCHLORIDE 50 MG/ML
25 INJECTION INTRAMUSCULAR; INTRAVENOUS ONCE
Status: COMPLETED | OUTPATIENT
Start: 2019-12-14 | End: 2019-12-14

## 2019-12-14 RX ORDER — ONDANSETRON 2 MG/ML
4 INJECTION INTRAMUSCULAR; INTRAVENOUS ONCE
Status: COMPLETED | OUTPATIENT
Start: 2019-12-14 | End: 2019-12-14

## 2019-12-14 RX ADMIN — DIPHENHYDRAMINE HYDROCHLORIDE 25 MG: 50 INJECTION, SOLUTION INTRAMUSCULAR; INTRAVENOUS at 09:22

## 2019-12-14 RX ADMIN — PROCHLORPERAZINE EDISYLATE 10 MG: 5 INJECTION INTRAMUSCULAR; INTRAVENOUS at 09:23

## 2019-12-14 RX ADMIN — SODIUM CHLORIDE 1000 ML: 9 INJECTION, SOLUTION INTRAVENOUS at 09:24

## 2019-12-14 RX ADMIN — KETOROLAC TROMETHAMINE 15 MG: 30 INJECTION, SOLUTION INTRAMUSCULAR; INTRAVENOUS at 09:18

## 2019-12-14 RX ADMIN — ONDANSETRON 4 MG: 2 INJECTION INTRAMUSCULAR; INTRAVENOUS at 09:15

## 2019-12-14 ASSESSMENT — PAIN DESCRIPTION - DESCRIPTORS: DESCRIPTORS: HEADACHE

## 2019-12-14 ASSESSMENT — ENCOUNTER SYMPTOMS
NAUSEA: 1
SHORTNESS OF BREATH: 0
ALLERGIC/IMMUNOLOGIC NEGATIVE: 1
WHEEZING: 0
COLOR CHANGE: 0
CONSTIPATION: 0
VOMITING: 0
ABDOMINAL PAIN: 0
SORE THROAT: 0
CHEST TIGHTNESS: 0
COUGH: 0
BLOOD IN STOOL: 0
EYE PAIN: 0
DIARRHEA: 0
CHOKING: 0
BACK PAIN: 0

## 2019-12-14 ASSESSMENT — PAIN DESCRIPTION - ORIENTATION: ORIENTATION: RIGHT;LEFT

## 2019-12-14 ASSESSMENT — PAIN - FUNCTIONAL ASSESSMENT: PAIN_FUNCTIONAL_ASSESSMENT: PREVENTS OR INTERFERES SOME ACTIVE ACTIVITIES AND ADLS

## 2019-12-14 ASSESSMENT — PAIN SCALES - GENERAL
PAINLEVEL_OUTOF10: 0
PAINLEVEL_OUTOF10: 9
PAINLEVEL_OUTOF10: 9

## 2019-12-14 ASSESSMENT — PAIN DESCRIPTION - FREQUENCY: FREQUENCY: CONTINUOUS

## 2019-12-14 ASSESSMENT — PAIN DESCRIPTION - LOCATION: LOCATION: HEAD

## 2019-12-14 ASSESSMENT — PAIN DESCRIPTION - PAIN TYPE: TYPE: ACUTE PAIN

## 2020-01-18 ENCOUNTER — HOSPITAL ENCOUNTER (EMERGENCY)
Age: 40
Discharge: HOME | End: 2020-01-18

## 2020-01-18 ENCOUNTER — APPOINTMENT (OUTPATIENT)
Dept: GENERAL RADIOLOGY | Age: 40
End: 2020-01-18

## 2020-01-18 VITALS
RESPIRATION RATE: 16 BRPM | WEIGHT: 293 LBS | HEART RATE: 96 BPM | SYSTOLIC BLOOD PRESSURE: 126 MMHG | DIASTOLIC BLOOD PRESSURE: 81 MMHG | OXYGEN SATURATION: 96 % | TEMPERATURE: 98.2 F | HEIGHT: 68 IN | BODY MASS INDEX: 44.41 KG/M2

## 2020-01-18 LAB
HCG, URINE, POC: NEGATIVE
Lab: NORMAL
NEGATIVE QC PASS/FAIL: NORMAL
POSITIVE QC PASS/FAIL: NORMAL

## 2020-01-18 PROCEDURE — 72072 X-RAY EXAM THORAC SPINE 3VWS: CPT

## 2020-01-18 PROCEDURE — 99284 EMERGENCY DEPT VISIT MOD MDM: CPT

## 2020-01-18 RX ORDER — CYCLOBENZAPRINE HCL 10 MG
10 TABLET ORAL 3 TIMES DAILY PRN
Qty: 21 TABLET | Refills: 0 | Status: SHIPPED | OUTPATIENT
Start: 2020-01-18 | End: 2020-01-28

## 2020-01-18 RX ORDER — ACETAMINOPHEN 500 MG
1000 TABLET ORAL 3 TIMES DAILY
Qty: 42 TABLET | Refills: 0 | Status: SHIPPED | OUTPATIENT
Start: 2020-01-18 | End: 2020-01-25

## 2020-01-18 RX ORDER — IBUPROFEN 600 MG/1
600 TABLET ORAL 3 TIMES DAILY PRN
Qty: 21 TABLET | Refills: 0 | Status: SHIPPED | OUTPATIENT
Start: 2020-01-18 | End: 2020-01-25

## 2020-01-18 RX ORDER — LIDOCAINE 50 MG/G
1 PATCH TOPICAL EVERY 24 HOURS
Qty: 10 PATCH | Refills: 0 | Status: SHIPPED | OUTPATIENT
Start: 2020-01-18 | End: 2020-01-28

## 2020-01-18 ASSESSMENT — PAIN DESCRIPTION - FREQUENCY: FREQUENCY: CONTINUOUS

## 2020-01-18 ASSESSMENT — PAIN DESCRIPTION - PROGRESSION: CLINICAL_PROGRESSION: GRADUALLY WORSENING

## 2020-01-18 ASSESSMENT — PAIN DESCRIPTION - ONSET: ONSET: GRADUAL

## 2020-01-18 ASSESSMENT — PAIN DESCRIPTION - LOCATION: LOCATION: BACK

## 2020-01-18 ASSESSMENT — PAIN DESCRIPTION - PAIN TYPE: TYPE: ACUTE PAIN

## 2020-01-18 ASSESSMENT — PAIN DESCRIPTION - DESCRIPTORS: DESCRIPTORS: ACHING;CONSTANT

## 2020-01-18 ASSESSMENT — PAIN SCALES - GENERAL: PAINLEVEL_OUTOF10: 6

## 2020-01-18 ASSESSMENT — PAIN DESCRIPTION - ORIENTATION: ORIENTATION: UPPER

## 2020-01-18 NOTE — ED PROVIDER NOTES
[facility]     Department of Emergency Medicine   ED  Provider Note  Admit Date/RoomTime: 1/18/2020  2:10 PM  ED Room: /  Chief Complaint:   Fall (0300 today ) and Back Pain (upper )    History of Present Illness   Source of history provided by:  patient. History/Exam Limitations: none. Gerri Sanchez is a 46 y.o. old female who has a past medical history of:   Past Medical History:   Diagnosis Date    Acute alcohol intoxication ([address]     Acute bronchitis     Alcohol abuse     Anxiety and depression     Benzodiazepine abuse ([address]     Breast lump in female     Right    Corneal abrasion     Ear ache     Elevated glucose level 11/3/2016    Elevated liver enzymes     Fatty liver 11/23/2016    From ultrasound of 11/23/16    Fatty liver disease, nonalcoholic 64/16/4028    Gastroenteritis     GERD (gastroesophageal reflux disease) 2010    Heart palpitations     History of stress test 02/08/2018    Hypokalemia     Irregular heartbeat     Migraine     Migraines     With Menstruation cycles    Miscarriage 2012    Morbid obesity with BMI of 40.0-44.9, adult ([facility] [address]     MRSA infection of labia 2011    MVP (mitral valve prolapse) 2004    per [facility]    Panic attacks     PCOS (polycystic ovarian syndrome) 2008    Pilonidal cyst     Rapid heart beat     Sinus tachycardia     SOBOE (shortness of breath on exertion)     Tobacco abuse     Vitamin D deficiency       presents to the emergency department by private vehicle, for acute, aching and stiffness right sided, midline upper and middle thoracic spine pain without radiation, for several hour(s) prior to arrival.  The pain was caused by slipping on ice at her house and falling down 4 steps. She has a history of no prior back problems. Since onset the symptoms have been constant and unchanged since onset and mild-moderate in severity. There has been no bowel or bladder incontinence associated with the pain.   There have been associated symptoms of nothing additional and denies any weakness, numbness, incontinence, dysuria, hematuria, abdominal pain, fever, abdominal swelling, chest pain, pelvic pain or perianal numbness. The the pain is aggraveated by any movement and relieved by nothing. ROS   Pertinent positives and negatives are stated within HPI, all other systems reviewed and are negative. Past Surgical History:   Procedure Laterality Date    BREAST LUMPECTOMY Right 1995    Benign    COLONOSCOPY  11/02/2011    Roro Vincent    PILONIDAL CYST EXCISION  [address] [place] [address] [address]   [facility] [address] [place] [address] [address]    TRANSESOPHAGEAL ECHOCARDIOGRAM  2004    showed MVP    UPPER GASTROINTESTINAL ENDOSCOPY N/A 12/28/2018    EGD BIOPSY performed by Neli Pérez MD at [address] History:  reports that she has been smoking cigarettes. She has been smoking about 1.00 pack per day. She has never used smokeless tobacco. She reports current alcohol use. She reports that she does not use drugs. Family History: family history includes Breast Cancer (age of onset: 22) in her maternal aunt; COPD in her maternal grandfather; Cancer (age of onset: 36) in her maternal aunt; Cancer (age of onset: 61) in her father; Heart Attack (age of onset: 48) in her paternal grandfather; Heart Attack (age of onset: 54) in her father. Allergies: Reglan [metoclopramide]    Physical Exam         ED Triage Vitals [01/18/20 1410]   BP Temp Temp Source Pulse Resp SpO2 Height Weight   126/81 98.2 °F (36.8 °C) Oral 106 16 96 % 5' 8\" (1.727 m) (!) 305 lb (138.3 kg)      Oxygen Saturation Interpretation: Normal.    Constitutional:  Alert, development consistent with age. HEENT:  NC/NT. Airway patent. Neck:  Normal ROM. Supple. Respiratory:  Clear to auscultation and breath sounds equal.  CV:  Regular rate and rhythm, normal heart sounds, without pathological murmurs, ectopy, gallops, or rubs. GI:  Abdomen Soft, nontender, good bowel sounds.   No firm or pulsatile mass.  Back: upper and middle thoracic spine right sided and midline. Tenderness: Severe. Swelling: no.              Range of Motion: full range with pain. CVA Tenderness: No.            Straight leg raising:  Bilateral negative. Skin:  Superficial abrasions, otherwise normal.  Distal Function:              Motor deficit: none. Sensory deficit: none. Pulse deficit: none. Calf Tenderness:  No Bilateral.               Edema:  none Both lower extremity(s). Reflexes: Bilateral patellar normal.  Gait:  Normal, patient seen walking briskly to x-ray department. Integument:  Normal turgor. Warm, dry, without visible rash. Lymphatics: No lymphangitis or adenopathy noted. Neurological:  Oriented. Motor functions intact. Lab / Imaging Results   (All laboratory and radiology results have been personally reviewed by myself)  Labs:  Results for orders placed or performed during the hospital encounter of 01/18/20   POC Pregnancy Urine Qual   Result Value Ref Range    HCG, Urine, POC Negative Negative    Lot Number [id number]     Positive QC Pass/Fail Pass     Negative QC Pass/Fail Pass      Imaging: All Radiology results interpreted by Radiologist unless otherwise noted. XR THORACIC SPINE (3 VIEWS)   Final Result      No fracture or dislocation of the thoracic spine. ED Course / Medical Decision Making   Medications - No data to display     Re-examination:  1/18/20       Time: 1500    Patients symptoms show no change. Results discussed. Consult(s):   None    Procedure(s):   none    Medical Decision Making:    Films were obtained based on positive suspicion for bony injury as per history/physical findings . Dee Dee Umana At this time the patient is without objective evidence of an acute process requiring inpatient management.   Patient received the prescriptions below and understands that she should not drive or do anything that may put her at risk while she is taking Flexeril as it may cause drowsiness. Specific conditions for emergent return have been discussed and the patient verbalized understanding to return immediately for new or worsening symptoms. Counseling: The emergency provider has spoken with the patient and discussed todays results, in addition to providing specific details for the plan of care and counseling regarding the diagnosis and prognosis. Questions are answered at this time and they are agreeable with the plan. Assessment      1. Acute right-sided thoracic back pain         Plan   Discharge to home  Patient condition is good    New Medications     New Prescriptions    ACETAMINOPHEN (TYLENOL) 500 MG TABLET    Take 2 tablets by mouth 3 times daily for 7 days    CYCLOBENZAPRINE (FLEXERIL) 10 MG TABLET    Take 1 tablet by mouth 3 times daily as needed for Muscle spasms    IBUPROFEN (ADVIL;MOTRIN) 600 MG TABLET    Take 1 tablet by mouth 3 times daily as needed for Pain or Fever    LIDOCAINE (LIDODERM) 5 %    Place 1 patch onto the skin every 24 hours for 10 days 12 hours on, 12 hours off. Electronically signed by Santiago Wiley PA-C   DD: 1/18/20  **This report was transcribed using voice recognition software. Every effort was made to ensure accuracy; however, inadvertent computerized transcription errors may be present.   END OF ED PROVIDER NOTE        Santiago Wiley PA-C  01/18/20 [address] Maria Luisa Stevens PA-C  01/19/20 [id number]

## 2020-03-25 PROBLEM — E55.9 VITAMIN D DEFICIENCY: Status: RESOLVED | Noted: 2018-12-28 | Resolved: 2020-03-24

## 2020-06-25 ENCOUNTER — HOSPITAL ENCOUNTER (EMERGENCY)
Age: 40
Discharge: HOME | End: 2020-06-25
Attending: EMERGENCY MEDICINE
Payer: MEDICAID

## 2020-06-25 ENCOUNTER — APPOINTMENT (OUTPATIENT)
Dept: GENERAL RADIOLOGY | Age: 40
End: 2020-06-25
Payer: MEDICAID

## 2020-06-25 VITALS
WEIGHT: 293 LBS | RESPIRATION RATE: 16 BRPM | DIASTOLIC BLOOD PRESSURE: 80 MMHG | BODY MASS INDEX: 44.41 KG/M2 | SYSTOLIC BLOOD PRESSURE: 130 MMHG | HEIGHT: 68 IN | TEMPERATURE: 97.9 F | HEART RATE: 85 BPM | OXYGEN SATURATION: 97 %

## 2020-06-25 LAB
ALBUMIN SERPL-MCNC: 4.1 G/DL (ref 3.5–5.2)
ALP BLD-CCNC: 78 U/L (ref 35–104)
ALT SERPL-CCNC: 88 U/L (ref 0–32)
ANION GAP SERPL CALCULATED.3IONS-SCNC: 15 MMOL/L (ref 7–16)
AST SERPL-CCNC: 128 U/L (ref 0–31)
BACTERIA: ABNORMAL /HPF
BASOPHILS ABSOLUTE: 0.04 E9/L (ref 0–0.2)
BASOPHILS RELATIVE PERCENT: 0.5 % (ref 0–2)
BILIRUB SERPL-MCNC: 0.8 MG/DL (ref 0–1.2)
BILIRUBIN URINE: ABNORMAL
BLOOD, URINE: ABNORMAL
BUN BLDV-MCNC: 6 MG/DL (ref 6–20)
CALCIUM SERPL-MCNC: 9.5 MG/DL (ref 8.6–10.2)
CHLORIDE BLD-SCNC: 99 MMOL/L (ref 98–107)
CLARITY: CLEAR
CO2: 24 MMOL/L (ref 22–29)
COLOR: YELLOW
CREAT SERPL-MCNC: 0.8 MG/DL (ref 0.5–1)
EOSINOPHILS ABSOLUTE: 0.17 E9/L (ref 0.05–0.5)
EOSINOPHILS RELATIVE PERCENT: 1.9 % (ref 0–6)
EPITHELIAL CELLS, UA: ABNORMAL /HPF
GFR AFRICAN AMERICAN: >60
GFR NON-AFRICAN AMERICAN: >60 ML/MIN/1.73
GLUCOSE BLD-MCNC: 128 MG/DL (ref 74–99)
GLUCOSE URINE: NEGATIVE MG/DL
HCG, URINE, POC: NEGATIVE
HCT VFR BLD CALC: 47.8 % (ref 34–48)
HEMOGLOBIN: 15.9 G/DL (ref 11.5–15.5)
HYALINE CASTS: ABNORMAL /LPF (ref 0–2)
IMMATURE GRANULOCYTES #: 0.05 E9/L
IMMATURE GRANULOCYTES %: 0.6 % (ref 0–5)
KETONES, URINE: NEGATIVE MG/DL
LEUKOCYTE ESTERASE, URINE: NEGATIVE
LIPASE: 36 U/L (ref 13–60)
LYMPHOCYTES ABSOLUTE: 1.13 E9/L (ref 1.5–4)
LYMPHOCYTES RELATIVE PERCENT: 12.9 % (ref 20–42)
Lab: NORMAL
MCH RBC QN AUTO: 33.5 PG (ref 26–35)
MCHC RBC AUTO-ENTMCNC: 33.3 % (ref 32–34.5)
MCV RBC AUTO: 100.8 FL (ref 80–99.9)
MONOCYTES ABSOLUTE: 0.75 E9/L (ref 0.1–0.95)
MONOCYTES RELATIVE PERCENT: 8.6 % (ref 2–12)
NEGATIVE QC PASS/FAIL: NORMAL
NEUTROPHILS ABSOLUTE: 6.59 E9/L (ref 1.8–7.3)
NEUTROPHILS RELATIVE PERCENT: 75.5 % (ref 43–80)
NITRITE, URINE: NEGATIVE
PDW BLD-RTO: 13.6 FL (ref 11.5–15)
PH UA: 6 (ref 5–9)
PLATELET # BLD: 174 E9/L (ref 130–450)
PMV BLD AUTO: 10.2 FL (ref 7–12)
POSITIVE QC PASS/FAIL: NORMAL
POTASSIUM REFLEX MAGNESIUM: 4.1 MMOL/L (ref 3.5–5)
PROTEIN UA: >=300 MG/DL
RBC # BLD: 4.74 E12/L (ref 3.5–5.5)
RBC UA: ABNORMAL /HPF (ref 0–2)
SODIUM BLD-SCNC: 138 MMOL/L (ref 132–146)
SPECIFIC GRAVITY UA: >=1.03 (ref 1–1.03)
TOTAL PROTEIN: 7.3 G/DL (ref 6.4–8.3)
TROPONIN: <0.01 NG/ML (ref 0–0.03)
UROBILINOGEN, URINE: 0.2 E.U./DL
WBC # BLD: 8.7 E9/L (ref 4.5–11.5)
WBC UA: ABNORMAL /HPF (ref 0–5)

## 2020-06-25 PROCEDURE — 2580000003 HC RX 258: Performed by: STUDENT IN AN ORGANIZED HEALTH CARE EDUCATION/TRAINING PROGRAM

## 2020-06-25 PROCEDURE — 71045 X-RAY EXAM CHEST 1 VIEW: CPT

## 2020-06-25 PROCEDURE — 84484 ASSAY OF TROPONIN QUANT: CPT

## 2020-06-25 PROCEDURE — 96374 THER/PROPH/DIAG INJ IV PUSH: CPT

## 2020-06-25 PROCEDURE — 6360000002 HC RX W HCPCS: Performed by: STUDENT IN AN ORGANIZED HEALTH CARE EDUCATION/TRAINING PROGRAM

## 2020-06-25 PROCEDURE — 85025 COMPLETE CBC W/AUTO DIFF WBC: CPT

## 2020-06-25 PROCEDURE — 81001 URINALYSIS AUTO W/SCOPE: CPT

## 2020-06-25 PROCEDURE — 6370000000 HC RX 637 (ALT 250 FOR IP): Performed by: STUDENT IN AN ORGANIZED HEALTH CARE EDUCATION/TRAINING PROGRAM

## 2020-06-25 PROCEDURE — 93005 ELECTROCARDIOGRAM TRACING: CPT | Performed by: STUDENT IN AN ORGANIZED HEALTH CARE EDUCATION/TRAINING PROGRAM

## 2020-06-25 PROCEDURE — 83690 ASSAY OF LIPASE: CPT

## 2020-06-25 PROCEDURE — 80053 COMPREHEN METABOLIC PANEL: CPT

## 2020-06-25 PROCEDURE — 99284 EMERGENCY DEPT VISIT MOD MDM: CPT

## 2020-06-25 RX ORDER — ONDANSETRON 4 MG/1
4 TABLET, ORALLY DISINTEGRATING ORAL EVERY 12 HOURS PRN
Qty: 4 TABLET | Refills: 0 | Status: SHIPPED | OUTPATIENT
Start: 2020-06-25 | End: 2020-06-27

## 2020-06-25 RX ORDER — ONDANSETRON 4 MG/1
4 TABLET, ORALLY DISINTEGRATING ORAL EVERY 12 HOURS PRN
Qty: 4 TABLET | Refills: 0 | Status: SHIPPED | OUTPATIENT
Start: 2020-06-25 | End: 2020-06-25 | Stop reason: SDUPTHER

## 2020-06-25 RX ORDER — PROMETHAZINE HYDROCHLORIDE 25 MG/1
25 TABLET ORAL DAILY PRN
Qty: 2 TABLET | Refills: 0 | Status: SHIPPED | OUTPATIENT
Start: 2020-06-25 | End: 2020-06-25

## 2020-06-25 RX ORDER — 0.9 % SODIUM CHLORIDE 0.9 %
1000 INTRAVENOUS SOLUTION INTRAVENOUS ONCE
Status: COMPLETED | OUTPATIENT
Start: 2020-06-25 | End: 2020-06-25

## 2020-06-25 RX ORDER — KETOROLAC TROMETHAMINE 30 MG/ML
15 INJECTION, SOLUTION INTRAMUSCULAR; INTRAVENOUS ONCE
Status: COMPLETED | OUTPATIENT
Start: 2020-06-25 | End: 2020-06-25

## 2020-06-25 RX ADMIN — SODIUM CHLORIDE 1000 ML: 9 INJECTION, SOLUTION INTRAVENOUS at 16:58

## 2020-06-25 RX ADMIN — KETOROLAC TROMETHAMINE 15 MG: 30 INJECTION, SOLUTION INTRAMUSCULAR at 19:02

## 2020-06-25 RX ADMIN — LIDOCAINE HYDROCHLORIDE: 20 SOLUTION ORAL; TOPICAL at 17:13

## 2020-06-25 ASSESSMENT — ENCOUNTER SYMPTOMS
EYE PAIN: 0
EYE REDNESS: 0
BACK PAIN: 0
DIARRHEA: 1
CHOKING: 0
COUGH: 0
TROUBLE SWALLOWING: 0
ABDOMINAL DISTENTION: 0
STRIDOR: 0
NAUSEA: 0
SHORTNESS OF BREATH: 0
EYE ITCHING: 0
VOMITING: 1
APNEA: 0
VOICE CHANGE: 0
EYE DISCHARGE: 0
COLOR CHANGE: 0
WHEEZING: 0
SINUS PRESSURE: 0
CHEST TIGHTNESS: 0
ABDOMINAL PAIN: 0
PHOTOPHOBIA: 0
SORE THROAT: 0

## 2020-06-25 ASSESSMENT — PAIN SCALES - GENERAL: PAINLEVEL_OUTOF10: 5

## 2020-06-25 NOTE — ED PROVIDER NOTES
80-year-old female with a past medical history of PVCs on metoprolol presents with complaints of diarrhea and vomiting. Patient states that she ate a pizza roll 2 hours prior to arrival.  States after she ate the pizza rolls she felt nauseous and vomited 4 times. Patient states that this food was not refrigerated and left out on the counter. She has no other associated symptoms. No one else in her family has similar symptoms. But no one else in the family ate the [place] rolls. \"  Denies any fevers, chills, difficulty swallowing, sore throat, chest pain, shortness of breath, wheezing, abdominal pain, flank pain, dysuria, hematuria, and constipation. Review of Systems   Constitutional: Negative for activity change, appetite change, chills and fever. HENT: Negative for congestion, dental problem, ear pain, sinus pressure, sore throat, trouble swallowing and voice change. Eyes: Negative for photophobia, pain, discharge, redness, itching and visual disturbance. Respiratory: Negative for apnea, cough, choking, chest tightness, shortness of breath, wheezing and stridor. Cardiovascular: Negative for chest pain, palpitations and leg swelling. Gastrointestinal: Positive for diarrhea and vomiting. Negative for abdominal distention, abdominal pain and nausea. Endocrine: Negative for polyphagia and polyuria. Genitourinary: Negative for difficulty urinating, dysuria and frequency. Musculoskeletal: Negative for arthralgias, back pain, neck pain and neck stiffness. Skin: Negative for color change, pallor, rash and wound. Neurological: Negative for dizziness, tremors, seizures, syncope, facial asymmetry, speech difficulty, weakness, light-headedness, numbness and headaches. Hematological: Negative for adenopathy. Does not bruise/bleed easily. Psychiatric/Behavioral: Negative for agitation and behavioral problems. All other systems reviewed and are negative.        Physical Exam  Vitals Coordination: Coordination normal.          Procedures     MDM  Number of Diagnoses or Management Options  Vomiting and diarrhea:   Diagnosis management comments: 76-year-old female presents with complaints of nausea, vomiting, and diarrhea after eating a unrefrigerated \"pizza roll. \"  Patient is being evaluated for but not limited to gastritis, enteritis, allergic reaction. On physical exam patient is in no acute distress, speaking full sentences but is uncomfortable. She has no signs of face or neck swelling, difficulty breathing, no stridorous breath sounds were heard, no wheezes rales or rhonchi with auscultation. She was treated for her symptoms with Zofran and IV fluids with relief. Patient's diagnostic labs indicate elevated hepatic function. Patient states that she is a daily drinker. States that she is not concerned with this. States that Ambar Laughlin is drinking because of quarantine. \"  She states that she does not want any help with quitting alcohol. And she will ask for help when she is ready. She was very pleasant throughout the entire exchange. She is in no acute distress. All her symptoms have resolved. Her symptoms likely due to bacteria from refrigerated deli meat. She was given Zofran for home. Patient was informed of all the results of her evaluation. She is agreeable with plan for discharge. ED Course as of Jun 28 1110 Thu Jun 25, 2020   1822 She was reevaluated bedside. States that she feels much better. Still states that she has a headache. She is given 15 mg of Toradol IV. [JV]   1847 Tolerated oral challenge. [JV]      ED Course User Index  [JV] Jerrell Benavides MD            ED Course as of Jun 28 1110 Thu Jun 25, 2020   5930 She was reevaluated bedside. States that she feels much better. Still states that she has a headache. She is given 15 mg of Toradol IV. [JV]   1847 Tolerated oral challenge.      [JV]      ED Course User Index  [JV] Jerrell Benavides orders placed or performed during the hospital encounter of 06/25/20   CBC Auto Differential   Result Value Ref Range    WBC 8.7 4.5 - 11.5 E9/L    RBC 4.74 3.50 - 5.50 E12/L    Hemoglobin 15.9 (H) 11.5 - 15.5 g/dL    Hematocrit 47.8 34.0 - 48.0 %    .8 (H) 80.0 - 99.9 fL    MCH 33.5 26.0 - 35.0 pg    MCHC 33.3 32.0 - 34.5 %    RDW 13.6 11.5 - 15.0 fL    Platelets 976 608 - 928 E9/L    MPV 10.2 7.0 - 12.0 fL    Neutrophils % 75.5 43.0 - 80.0 %    Immature Granulocytes % 0.6 0.0 - 5.0 %    Lymphocytes % 12.9 (L) 20.0 - 42.0 %    Monocytes % 8.6 2.0 - 12.0 %    Eosinophils % 1.9 0.0 - 6.0 %    Basophils % 0.5 0.0 - 2.0 %    Neutrophils Absolute 6.59 1.80 - 7.30 E9/L    Immature Granulocytes # 0.05 E9/L    Lymphocytes Absolute 1.13 (L) 1.50 - 4.00 E9/L    Monocytes Absolute 0.75 0.10 - 0.95 E9/L    Eosinophils Absolute 0.17 0.05 - 0.50 E9/L    Basophils Absolute 0.04 0.00 - 0.20 E9/L   Comprehensive Metabolic Panel w/ Reflex to MG   Result Value Ref Range    Sodium 138 132 - 146 mmol/L    Potassium reflex Magnesium 4.1 3.5 - 5.0 mmol/L    Chloride 99 98 - 107 mmol/L    CO2 24 22 - 29 mmol/L    Anion Gap 15 7 - 16 mmol/L    Glucose 128 (H) 74 - 99 mg/dL    BUN 6 6 - 20 mg/dL    CREATININE 0.8 0.5 - 1.0 mg/dL    GFR Non-African American >60 >=60 mL/min/1.73    GFR African American >60     Calcium 9.5 8.6 - 10.2 mg/dL    Total Protein 7.3 6.4 - 8.3 g/dL    Alb 4.1 3.5 - 5.2 g/dL    Total Bilirubin 0.8 0.0 - 1.2 mg/dL    Alkaline Phosphatase 78 35 - 104 U/L    ALT 88 (H) 0 - 32 U/L     (H) 0 - 31 U/L   Lipase   Result Value Ref Range    Lipase 36 13 - 60 U/L   Urinalysis, reflex to microscopic   Result Value Ref Range    Color, UA Yellow Straw/Yellow    Clarity, UA Clear Clear    Glucose, Ur Negative Negative mg/dL    Bilirubin Urine SMALL (A) Negative    Ketones, Urine Negative Negative mg/dL    Specific Gravity, UA >=1.030 1.005 - 1.030    Blood, Urine SMALL (A) Negative    pH, UA 6.0 5.0 - 9.0    Protein, UA >=300 (A) Negative mg/dL    Urobilinogen, Urine 0.2 <2.0 E.U./dL    Nitrite, Urine Negative Negative    Leukocyte Esterase, Urine Negative Negative   Troponin   Result Value Ref Range    Troponin <0.01 0.00 - 0.03 ng/mL   Microscopic Urinalysis   Result Value Ref Range    Hyaline Casts, UA 0-2 0 - 2 /LPF    WBC, UA 2-5 0 - 5 /HPF    RBC, UA 1-3 0 - 2 /HPF    Epithelial Cells, UA FEW /HPF    Bacteria, UA MODERATE (A) None Seen /HPF   POC Pregnancy Urine   Result Value Ref Range    HCG, Urine, POC Negative Negative    Lot Number [id number]     Positive QC Pass/Fail Pass     Negative QC Pass/Fail Pass    EKG 12 Lead   Result Value Ref Range    Ventricular Rate 98 BPM    Atrial Rate 98 BPM    P-R Interval 174 ms    QRS Duration 82 ms    Q-T Interval 378 ms    QTc Calculation (Bazett) 482 ms    P Axis 49 degrees    R Axis 91 degrees    T Axis 13 degrees       Radiology:  XR CHEST PORTABLE   Final Result   normal chest                         ------------------------- NURSING NOTES AND VITALS REVIEWED ---------------------------  Date / Time Roomed:  6/25/2020  4:01 PM  ED Bed Assignment:  14/14    The nursing notes within the ED encounter and vital signs as below have been reviewed. /80   Pulse 85   Temp 97.9 °F (36.6 °C) (Oral)   Resp 16   Ht 5' 8\" (1.727 m)   Wt (!) 315 lb (142.9 kg)   LMP 06/01/2020   SpO2 97%   BMI 47.90 kg/m²   Oxygen Saturation Interpretation: Normal      ------------------------------------------ PROGRESS NOTES ------------------------------------------  6:00 PM EDT  I have spoken with the patient and discussed todays results, in addition to providing specific details for the plan of care and counseling regarding the diagnosis and prognosis. Their questions are answered at this time and they are agreeable with the plan. I discussed at length with them reasons for immediate return here for re evaluation.  They will followup with their primary care physician by calling their office

## 2020-06-25 NOTE — ED NOTES
Pt given d/c instructions. Pt to f.u with pcp in 3 days. Pt given instructions on when to return to ED. Pt verbalized understanding of instructions. Pt left in stable and ambulatory condition. Pt IV d/c without any complications.      Jeff Solano RN  06/25/20 1927

## 2020-06-26 LAB
EKG ATRIAL RATE: 98 BPM
EKG P AXIS: 49 DEGREES
EKG P-R INTERVAL: 174 MS
EKG Q-T INTERVAL: 378 MS
EKG QRS DURATION: 82 MS
EKG QTC CALCULATION (BAZETT): 482 MS
EKG R AXIS: 91 DEGREES
EKG T AXIS: 13 DEGREES
EKG VENTRICULAR RATE: 98 BPM

## 2020-06-26 PROCEDURE — 93010 ELECTROCARDIOGRAM REPORT: CPT | Performed by: INTERNAL MEDICINE

## 2020-12-01 ENCOUNTER — HOSPITAL ENCOUNTER (EMERGENCY)
Age: 40
Discharge: HOME | End: 2020-12-02
Payer: MEDICAID

## 2020-12-01 PROCEDURE — 99285 EMERGENCY DEPT VISIT HI MDM: CPT

## 2020-12-02 ENCOUNTER — APPOINTMENT (OUTPATIENT)
Dept: GENERAL RADIOLOGY | Age: 40
End: 2020-12-02
Payer: MEDICAID

## 2020-12-02 VITALS
WEIGHT: 293 LBS | TEMPERATURE: 98 F | HEART RATE: 60 BPM | HEIGHT: 68 IN | BODY MASS INDEX: 44.41 KG/M2 | RESPIRATION RATE: 14 BRPM | OXYGEN SATURATION: 98 % | DIASTOLIC BLOOD PRESSURE: 76 MMHG | SYSTOLIC BLOOD PRESSURE: 121 MMHG

## 2020-12-02 LAB
ALBUMIN SERPL-MCNC: 3.9 G/DL (ref 3.5–5.2)
ALP BLD-CCNC: 90 U/L (ref 35–104)
ALT SERPL-CCNC: 167 U/L (ref 0–32)
ANION GAP SERPL CALCULATED.3IONS-SCNC: 10 MMOL/L (ref 7–16)
AST SERPL-CCNC: 184 U/L (ref 0–31)
BACTERIA: ABNORMAL /HPF
BASOPHILS ABSOLUTE: 0.02 E9/L (ref 0–0.2)
BASOPHILS RELATIVE PERCENT: 0.2 % (ref 0–2)
BILIRUB SERPL-MCNC: 0.6 MG/DL (ref 0–1.2)
BILIRUBIN URINE: ABNORMAL
BLOOD, URINE: NEGATIVE
BUN BLDV-MCNC: 10 MG/DL (ref 6–20)
CALCIUM SERPL-MCNC: 9.2 MG/DL (ref 8.6–10.2)
CHLORIDE BLD-SCNC: 101 MMOL/L (ref 98–107)
CLARITY: ABNORMAL
CO2: 26 MMOL/L (ref 22–29)
COLOR: YELLOW
CREAT SERPL-MCNC: 0.7 MG/DL (ref 0.5–1)
EKG ATRIAL RATE: 54 BPM
EKG P AXIS: -11 DEGREES
EKG P-R INTERVAL: 184 MS
EKG Q-T INTERVAL: 474 MS
EKG QRS DURATION: 88 MS
EKG QTC CALCULATION (BAZETT): 449 MS
EKG R AXIS: 18 DEGREES
EKG T AXIS: 28 DEGREES
EKG VENTRICULAR RATE: 54 BPM
EOSINOPHILS ABSOLUTE: 0.22 E9/L (ref 0.05–0.5)
EOSINOPHILS RELATIVE PERCENT: 2.6 % (ref 0–6)
EPITHELIAL CELLS, UA: ABNORMAL /HPF
GFR AFRICAN AMERICAN: >60
GFR NON-AFRICAN AMERICAN: >60 ML/MIN/1.73
GLUCOSE BLD-MCNC: 115 MG/DL (ref 74–99)
GLUCOSE URINE: NEGATIVE MG/DL
HCT VFR BLD CALC: 43.5 % (ref 34–48)
HEMOGLOBIN: 14.8 G/DL (ref 11.5–15.5)
IMMATURE GRANULOCYTES #: 0.06 E9/L
IMMATURE GRANULOCYTES %: 0.7 % (ref 0–5)
KETONES, URINE: NEGATIVE MG/DL
LACTIC ACID: 1.5 MMOL/L (ref 0.5–2.2)
LEUKOCYTE ESTERASE, URINE: NEGATIVE
LIPASE: 39 U/L (ref 13–60)
LYMPHOCYTES ABSOLUTE: 1.77 E9/L (ref 1.5–4)
LYMPHOCYTES RELATIVE PERCENT: 20.6 % (ref 20–42)
MCH RBC QN AUTO: 34.7 PG (ref 26–35)
MCHC RBC AUTO-ENTMCNC: 34 % (ref 32–34.5)
MCV RBC AUTO: 102.1 FL (ref 80–99.9)
MONOCYTES ABSOLUTE: 1.01 E9/L (ref 0.1–0.95)
MONOCYTES RELATIVE PERCENT: 11.7 % (ref 2–12)
NEUTROPHILS ABSOLUTE: 5.53 E9/L (ref 1.8–7.3)
NEUTROPHILS RELATIVE PERCENT: 64.2 % (ref 43–80)
NITRITE, URINE: NEGATIVE
PDW BLD-RTO: 13.8 FL (ref 11.5–15)
PH UA: 6 (ref 5–9)
PLATELET # BLD: 177 E9/L (ref 130–450)
PMV BLD AUTO: 10.7 FL (ref 7–12)
POTASSIUM SERPL-SCNC: 3.6 MMOL/L (ref 3.5–5)
PROTEIN UA: 30 MG/DL
RBC # BLD: 4.26 E12/L (ref 3.5–5.5)
RBC UA: ABNORMAL /HPF (ref 0–2)
SODIUM BLD-SCNC: 137 MMOL/L (ref 132–146)
SPECIFIC GRAVITY UA: 1.02 (ref 1–1.03)
TOTAL PROTEIN: 6.8 G/DL (ref 6.4–8.3)
TROPONIN: <0.01 NG/ML (ref 0–0.03)
UROBILINOGEN, URINE: 0.2 E.U./DL
WBC # BLD: 8.6 E9/L (ref 4.5–11.5)
WBC UA: ABNORMAL /HPF (ref 0–5)

## 2020-12-02 PROCEDURE — 93005 ELECTROCARDIOGRAM TRACING: CPT | Performed by: PHYSICIAN ASSISTANT

## 2020-12-02 PROCEDURE — 81001 URINALYSIS AUTO W/SCOPE: CPT

## 2020-12-02 PROCEDURE — 83690 ASSAY OF LIPASE: CPT

## 2020-12-02 PROCEDURE — 85025 COMPLETE CBC W/AUTO DIFF WBC: CPT

## 2020-12-02 PROCEDURE — 83605 ASSAY OF LACTIC ACID: CPT

## 2020-12-02 PROCEDURE — 71045 X-RAY EXAM CHEST 1 VIEW: CPT

## 2020-12-02 PROCEDURE — 84484 ASSAY OF TROPONIN QUANT: CPT

## 2020-12-02 PROCEDURE — U0003 INFECTIOUS AGENT DETECTION BY NUCLEIC ACID (DNA OR RNA); SEVERE ACUTE RESPIRATORY SYNDROME CORONAVIRUS 2 (SARS-COV-2) (CORONAVIRUS DISEASE [COVID-19]), AMPLIFIED PROBE TECHNIQUE, MAKING USE OF HIGH THROUGHPUT TECHNOLOGIES AS DESCRIBED BY CMS-2020-01-R: HCPCS

## 2020-12-02 PROCEDURE — 80053 COMPREHEN METABOLIC PANEL: CPT

## 2020-12-02 NOTE — ED PROVIDER NOTES
[facility]  Department of Emergency Medicine   ED  Encounter Note  Admit Date/RoomTime: 2020 11:11 PM  ED Room:     NAME: Nisha Jefferson  : 1980  MRN: [id number]     Chief Complaint:  Shortness of Breath (since this morning, states she had vivitrol shot 7 days ago) and Hypertension (states her bp has been high and hr has been low )    History of Present Illness        Nisha Jefferson is a 45 y.o. female who presents to the ED by private vehicle for high blood pressure but her heart rate is lower than usual and she feels short of breath, beginning today ago. The complaint has been stable and are mild in severity. 9 days ago patient reports having a low-grade fever 99.8. At that time she had no other symptoms including body aches, upper respiratory infection, dysuria, abdominal pain, chest pain. 2 days later she had a Vivitrol shot for alcohol abuse. Since that time she reports she has not had any alcohol at all. At first she was having some alcohol withdrawal anxiety and tremors but she had some alprazolam that she was using to help her with her symptoms. She reports she has slowly not been taking those over the past 7 days as well. She reports they also gave her some Wellbutrin to take when she got the Vivitrol and she has been taking it. She reports that she has not felt good since she got the Vivitrol but she assumed that was from discontinuing alcohol but today she was concerned because her blood pressure was so high and her heart rate was in the 60s and she was not sure she should take her metoprolol. She also was concerned about taking the alprazolam for feeling anxious because she was worried about how low her heart rate was.   Patient has her wrist blood pressure machine with her and is showing me her history from the day which includes 15 blood pressure checks the highest one reading 151/90, most of them in the 120s over 80s with heart rate anywhere from 56-78. Patient reports her pulse ox has been around 96%  She is smoker. ROS   Pertinent positives and negatives are stated within HPI, all other systems reviewed and are negative. Past Medical History:  has a past medical history of Acute alcohol intoxication ([facility] Acute bronchitis, Alcohol abuse, Anxiety and depression, Benzodiazepine abuse ([facility] Breast lump in female, Corneal abrasion, Ear ache, Elevated glucose level, Elevated liver enzymes, Fatty liver, Fatty liver disease, nonalcoholic, Gastroenteritis, GERD (gastroesophageal reflux disease), Heart palpitations, History of stress test, Hypokalemia, Irregular heartbeat, Migraine, Migraines, Miscarriage, Morbid obesity with BMI of 40.0-44.9, adult ([facility] MRSA infection of labia, MVP (mitral valve prolapse), Panic attacks, PCOS (polycystic ovarian syndrome), Pilonidal cyst, Rapid heart beat, Sinus tachycardia, SOBOE (shortness of breath on exertion), Tobacco abuse, and Vitamin D deficiency. Surgical History:  has a past surgical history that includes Tonsillectomy (1991); Breast lumpectomy (Right, 1995); Pilonidal cyst excision (2000); transesophageal echocardiogram (2004); Colonoscopy (11/02/2011); and Upper gastrointestinal endoscopy (N/A, 12/28/2018). Social History:  reports that she has been smoking cigarettes. She has been smoking about 1.00 pack per day. She has never used smokeless tobacco. She reports current alcohol use. She reports that she does not use drugs. Family History: family history includes Breast Cancer (age of onset: 22) in her maternal aunt; COPD in her maternal grandfather; Cancer (age of onset: 36) in her maternal aunt; Cancer (age of onset: 61) in her father; Heart Attack (age of onset: 48) in her paternal grandfather; Heart Attack (age of onset: 54) in her father.      Allergies: Reglan [metoclopramide]    Physical Exam Section   Oxygen Saturation Interpretation: Normal.   ED Triage Vitals [12/01/20 2310]   BP Temp Temp Source Pulse Resp SpO2 Height Weight   (!) 143/99 97 °F (36.1 °C) Temporal 88 18 98 % 5' 8\" (1.727 m) (!) 320 lb (145.2 kg)       Physical Exam  Constitutional/General: Alert and oriented x3, well appearing, non toxic. HEENT:  NC/NT. PERRLA,  Airway patent. Oral mucosa moist, oropharynx unremarkable. Tympanic membranes normal in appearance bilaterally. Conjunctiva not injected. Neck: Supple, full ROM, non tender to palpation in the midline, no stridor, no crepitus, no meningeal signs  Respiratory: Lungs clear to auscultation bilaterally, no wheezes, rales, or rhonchi. Not in respiratory distress,   CV:  Regular rate. Regular rhythm. No murmurs, gallops, or rubs. 2+ distal pulses  Chest: No chest wall tenderness  GI:  Abdomen Soft, Non tender, Non distended. +BS. No rebound, guarding, or rigidity. No pulsatile masses. Musculoskeletal: Moves all extremities x 4. Warm and well perfused, no clubbing, cyanosis. Mild edema of bilateral hands, nonpitting, radial pulses normal.  Capillary refill <3 seconds  Integument: skin warm and dry. No rashes.    Lymphatic: no lymphadenopathy noted  Neurologic: GCS 15, no focal deficits, symmetric strength 5/5 in the upper and lower extremities bilaterally  Psychiatric: Normal Affect      Lab / Imaging Results   (All laboratory and radiology results have been personally reviewed by myself)  Labs:  Results for orders placed or performed during the hospital encounter of 12/01/20   CBC Auto Differential   Result Value Ref Range    WBC 8.6 4.5 - 11.5 E9/L    RBC 4.26 3.50 - 5.50 E12/L    Hemoglobin 14.8 11.5 - 15.5 g/dL    Hematocrit 43.5 34.0 - 48.0 %    .1 (H) 80.0 - 99.9 fL    MCH 34.7 26.0 - 35.0 pg    MCHC 34.0 32.0 - 34.5 %    RDW 13.8 11.5 - 15.0 fL    Platelets 251 921 - 071 E9/L    MPV 10.7 7.0 - 12.0 fL    Neutrophils % 64.2 43.0 - 80.0 %    Immature Granulocytes % 0.7 0.0 - 5.0 %    Lymphocytes % 20.6 20.0 - 42.0 %    Monocytes % 11.7 2.0 - 12.0 %    Eosinophils % 2.6 0.0 - 6.0 %    Basophils % 0.2 0.0 - 2.0 %    Neutrophils Absolute 5.53 1.80 - 7.30 E9/L    Immature Granulocytes # 0.06 E9/L    Lymphocytes Absolute 1.77 1.50 - 4.00 E9/L    Monocytes Absolute 1.01 (H) 0.10 - 0.95 E9/L    Eosinophils Absolute 0.22 0.05 - 0.50 E9/L    Basophils Absolute 0.02 0.00 - 0.20 E9/L   Comprehensive Metabolic Panel   Result Value Ref Range    Sodium 137 132 - 146 mmol/L    Potassium 3.6 3.5 - 5.0 mmol/L    Chloride 101 98 - 107 mmol/L    CO2 26 22 - 29 mmol/L    Anion Gap 10 7 - 16 mmol/L    Glucose 115 (H) 74 - 99 mg/dL    BUN 10 6 - 20 mg/dL    CREATININE 0.7 0.5 - 1.0 mg/dL    GFR Non-African American >60 >=60 mL/min/1.73    GFR African American >60     Calcium 9.2 8.6 - 10.2 mg/dL    Total Protein 6.8 6.4 - 8.3 g/dL    Alb 3.9 3.5 - 5.2 g/dL    Total Bilirubin 0.6 0.0 - 1.2 mg/dL    Alkaline Phosphatase 90 35 - 104 U/L     (H) 0 - 32 U/L     (H) 0 - 31 U/L   Troponin   Result Value Ref Range    Troponin <0.01 0.00 - 0.03 ng/mL   Lipase   Result Value Ref Range    Lipase 39 13 - 60 U/L   Lactic Acid, Plasma   Result Value Ref Range    Lactic Acid 1.5 0.5 - 2.2 mmol/L   Urinalysis   Result Value Ref Range    Color, UA Yellow Straw/Yellow    Clarity, UA SL CLOUDY Clear    Glucose, Ur Negative Negative mg/dL    Bilirubin Urine SMALL (A) Negative    Ketones, Urine Negative Negative mg/dL    Specific Gravity, UA 1.025 1.005 - 1.030    Blood, Urine Negative Negative    pH, UA 6.0 5.0 - 9.0    Protein, UA 30 (A) Negative mg/dL    Urobilinogen, Urine 0.2 <2.0 E.U./dL    Nitrite, Urine Negative Negative    Leukocyte Esterase, Urine Negative Negative   Microscopic Urinalysis   Result Value Ref Range    WBC, UA NONE 0 - 5 /HPF    RBC, UA NONE 0 - 2 /HPF    Epithelial Cells, UA MODERATE /HPF    Bacteria, UA RARE (A) None Seen /HPF   Covid-19 Ambulatory   Result Value Ref Range    Source Oral swab      Imaging:   All Radiology results interpreted by Radiologist unless otherwise noted. XR CHEST PORTABLE   Final Result   No acute process. .        EKG #1:  Interpreted by emergency department physician unless otherwise noted. Time:  0101    Rate: 54  Rhythm: Sinus. Interpretation: sinus bradycardia. ED Course / Medical Decision Making   Medications - No data to display     Re-Evaluations:  12/2/20      Time: Continues to be relaxed, have normal breathing,normal HR. Pt took her own home medications while in ED    Consultations:             None    Procedures:   none    MDM:  Pt presents to ED with generally not feeling well for the past 7 days since getting her Vivitrol shot but today developed a faint sense of shortness of breath. Patient thinks it may be her anxiety but does not want to take any of her alprazolam because she feels like her heart rate is too low even though her blood pressure has been normal or even sometimes a little high. Patient is taking the Vivitrol to detox from alcohol in addition to the Vivitrol she is taking Wellbutrin. Patient has not relapsed or drink any alcohol during this time. Patient reports she has known slight elevation in her ALT and AST. These are shown this evening to be even more elevated than her previous blood draw which was last week and which showed an ALT of 111 and an AST of 112. Patient showed me several blood pressures and heart rates that she had been keeping track of throughout the day and all most all of the blood pressures were within normal limits and heart rates were at ranged between 56 and 78. Patient is advised to take her blood pressure in the morning and at night and make a record of it for her next primary care doctor visit. I advised patient to try to cease taking her blood pressure so many times throughout the day as has only going to heightened her anxiety.   Advised patient that her blood pressures and heart rates were perfectly fine and it would be perfectly safe for her to take her metoprolol which she did in department. EKG showed a sinus bradycardia rate of 55. Due to the elevation in her ALT and AST patient is advised for a redraw of these tests before receiving any more Vivitrol. Plan of Care/Counseling:  I reviewed today's visit with the patient in addition to providing specific details for the plan of care and counseling regarding the diagnosis and prognosis. Questions are answered at this time and are agreeable with the plan. Assessment      1. Shortness of breath    2. Transaminitis      This patient's ED course included: a personal history and physicial examination and re-evaluation prior to disposition  This patient has remained hemodynamically stable and improved during their ED course. Plan   Discharge to home. Patient condition is stable. New Medications     Discharge Medication List as of 12/2/2020  2:23 AM        Electronically signed by Jaswant Cameron PA-C   DD: 12/2/20  **This report was transcribed using voice recognition software. Every effort was made to ensure accuracy; however, inadvertent computerized transcription errors may be present.   [address] Carvajal PA-C  12/02/20 [id number]

## 2020-12-03 ENCOUNTER — CARE COORDINATION (OUTPATIENT)
Dept: CARE COORDINATION | Age: 40
End: 2020-12-03

## 2020-12-03 LAB
SARS-COV-2: NOT DETECTED
SOURCE: NORMAL

## 2020-12-03 NOTE — CARE COORDINATION
Patient contacted regarding Rima Mitchell. Discussed COVID-19 related testing which was pending at this time. Test results were pending. Patient informed of results, if available? Yes    Care Transition Nurse/ Ambulatory Care Manager contacted the patient by telephone to perform post discharge assessment. Call within 2 business days of discharge: Yes. Verified name and  with patient as identifiers. Provided introduction to self, and explanation of the CTN/ACM role, and reason for call due to risk factors for infection and/or exposure to COVID-19. Symptoms reviewed with patient who verbalized the following symptoms: shortness of breath and anxiety, abdominal bloating, diarrhea intermittent.    -Pt reports that she is feeling better. She feels her SOB is related to abdominal bloating from drinking diet coke. Cough is her baseline smokers cough. She said she continues to smoke cigarettes. She has stopped drinking alcohol for 11 days now. Since she has had the Vivitrol injection. Tremors gone. No fever and no loss of taste/smell.   -Pt reports not taking BP yet today. Yesterday, /80 range & pulse was low 70's range. She does have a pulse ox monitor. Pulse ox while on the phone was 96% with HR 78.    -Pt reports that she has had issues with anxiety since she was 25 yrs old. She said her anxiety is better controlled. She usually takes Xanax 1mg every day or every other day depending on how she feels. Pt said took Wellbutrin for 6 days and then stopped. She has felt better since.    -Pt reports keeping hydrated and appetite is normal.   -Pt is active with MyChart. She is monitoring for the Covid test results. Due to no new or worsening symptoms encounter was not routed to provider for escalation. Discussed follow-up appointments. If no appointment was previously scheduled, appointment scheduling offered: Yes  [facility] follow up appointment(s): No future appointments.   [facility] follow up appointment(s): no  -No PCP appt currently. Offered to assist scheduling an appt, Pt declined. She doesn't feel she needs one currently. Pt said her PCP office never answers the phone. When she wants to see her PCP, she just goes to the PCP office and says she needs to be seen. -Psychiatry appt 12- with [facility]. Non-face-to-face services provided:  Reviewed AVS     Advance Care Planning:   Does patient have an Advance Directive:  decision maker updated. Patient has following risk factors of: HTN, tobacco abuse, anxiety, depression, obesity, alcohol abuse, GERD, fatty liver, migraine. CTN/ACM reviewed discharge instructions, medical action plan and red flags such as increased shortness of breath, increasing fever and signs of decompensation with patient who verbalized understanding. Discussed exposure protocols and quarantine with CDC Guidelines What to do if you are sick with coronavirus disease 2019.  Patient was given an opportunity for questions and concerns. The patient agrees to contact the [facility] exposure line [phone], [facility] department [place] Department of Health: ([phone]) and PCP office for questions related to their healthcare. CTN/ACM provided contact information for future needs. Reviewed and educated patient on any new and changed medications related to discharge diagnosis     Patient/family/caregiver given information for [facility] and agrees to enroll no  Patient's preferred e-mail: no   Patient's preferred phone number: no  Based on Loop alert triggers, patient will be contacted by nurse care manager for worsening symptoms. Pt will be followed up in 5 to 7 days based on severity of symptoms and risk factors.

## 2020-12-16 ENCOUNTER — CARE COORDINATION (OUTPATIENT)
Dept: CARE COORDINATION | Age: 40
End: 2020-12-16

## 2020-12-16 NOTE — CARE COORDINATION
You Patient resolved from the Care Transitions episode on 12-. Discussed COVID-19 related testing which was available at this time. Test results were negative. Patient informed of results, if available? Yes    Patient/family has been provided the following resources and education related to COVID-19:                         Signs, symptoms and red flags related to COVID-19            CDC exposure and quarantine guidelines            [facility] exposure contact - [phone]            Contact for their local Department of Health                 Patient currently reports that the following symptoms have improved:    -Pt reports that she is doing well. Abdominal bloating is less and abdominal pain is gone. Cough remains baseline with smoking. No fever. Pt said BP, pulse ox and pulse rate have been in a normal range.    -Pt reports that she has not had a drink of alcohol. Praise given and she was appreciative.   -Pt reports anxiety is intermittent. She takes Xanax as needed. She said it's about 1mg a day. .    -Psych appt today via phone. She said that she is going to take the oral form of Vivitrol for one month and see if her liver enzymes come down.     -Pt is in agreement with ACM signing off. No further outreach scheduled with this CTN/ACM. Episode of Care resolved. Patient has this CTN/ACM contact information if future needs arise.

## 2021-06-01 ENCOUNTER — HOSPITAL ENCOUNTER (EMERGENCY)
Age: 41
Discharge: HOME | End: 2021-06-01
Payer: MEDICAID

## 2021-06-01 VITALS
DIASTOLIC BLOOD PRESSURE: 72 MMHG | WEIGHT: 293 LBS | HEIGHT: 68 IN | RESPIRATION RATE: 14 BRPM | HEART RATE: 78 BPM | OXYGEN SATURATION: 97 % | BODY MASS INDEX: 44.41 KG/M2 | SYSTOLIC BLOOD PRESSURE: 136 MMHG | TEMPERATURE: 98 F

## 2021-06-01 DIAGNOSIS — T19.2XXA RETAINED TAMPON, INITIAL ENCOUNTER: Primary | ICD-10-CM

## 2021-06-01 LAB
CLUE CELLS: NORMAL
SOURCE WET PREP: NORMAL
TRICHOMONAS PREP: NORMAL
YEAST WET PREP: NORMAL

## 2021-06-01 PROCEDURE — 87210 SMEAR WET MOUNT SALINE/INK: CPT

## 2021-06-01 PROCEDURE — 99284 EMERGENCY DEPT VISIT MOD MDM: CPT

## 2021-06-01 ASSESSMENT — ENCOUNTER SYMPTOMS
CHEST TIGHTNESS: 0
COUGH: 0
TROUBLE SWALLOWING: 0
SINUS PAIN: 0
SHORTNESS OF BREATH: 0
SINUS PRESSURE: 0
SORE THROAT: 0
VOMITING: 0
NAUSEA: 0
FACIAL SWELLING: 0
CONSTIPATION: 0
ABDOMINAL PAIN: 0
DIARRHEA: 0
BACK PAIN: 0

## 2021-06-01 NOTE — ED PROVIDER NOTES
---------------------------------------------  Past Medical History:  has a past medical history of Acute alcohol intoxication ([facility] Acute bronchitis, Alcohol abuse, Anxiety and depression, Benzodiazepine abuse ([facility] Breast lump in female, Corneal abrasion, Ear ache, Elevated glucose level, Elevated liver enzymes, Fatty liver, Fatty liver disease, nonalcoholic, Gastroenteritis, GERD (gastroesophageal reflux disease), Heart palpitations, History of stress test, Hypokalemia, Irregular heartbeat, Migraine, Migraines, Miscarriage, Morbid obesity with BMI of 40.0-44.9, adult ([facility] MRSA infection of labia, MVP (mitral valve prolapse), Panic attacks, PCOS (polycystic ovarian syndrome), Pilonidal cyst, Rapid heart beat, Sinus tachycardia, SOBOE (shortness of breath on exertion), Tobacco abuse, and Vitamin D deficiency. Past Surgical History:  has a past surgical history that includes Tonsillectomy (1991); Breast lumpectomy (Right, 1995); Pilonidal cyst excision (2000); transesophageal echocardiogram (2004); Colonoscopy (11/02/2011); and Upper gastrointestinal endoscopy (N/A, 12/28/2018). Social History:  reports that she has been smoking cigarettes. She has been smoking about 1.00 pack per day. She has never used smokeless tobacco. She reports current alcohol use. She reports that she does not use drugs. Family History: family history includes Breast Cancer (age of onset: 22) in her maternal aunt; COPD in her maternal grandfather; Cancer (age of onset: 36) in her maternal aunt; Cancer (age of onset: 61) in her father; Heart Attack (age of onset: 48) in her paternal grandfather; Heart Attack (age of onset: 54) in her father. The patients home medications have been reviewed.     Allergies: Reglan [metoclopramide]    -------------------------------------------------- RESULTS -------------------------------------------------  All laboratory and radiology results have been personally reviewed by myself LABS:  Results for orders placed or performed during the hospital encounter of 06/01/21   Wet prep, genital    Specimen: Vaginal   Result Value Ref Range    Trichomonas Prep None Seen     Yeast, Wet Prep None Seen     Clue Cells, Wet Prep None Seen     Source Wet Prep VAGINAL        RADIOLOGY:  Interpreted by Radiologist.  No orders to display       ------------------------- NURSING NOTES AND VITALS REVIEWED ---------------------------   The nursing notes within the ED encounter and vital signs as below have been reviewed. BP (!) 142/88   Pulse 80   Temp 98 °F (36.7 °C) (Temporal)   Resp 14   Ht 5' 8\" (1.727 m)   Wt 300 lb (136.1 kg)   SpO2 97%   BMI 45.61 kg/m²   Oxygen Saturation Interpretation: Normal      ---------------------------------------------------PHYSICAL EXAM--------------------------------------    Physical Exam  Vitals and nursing note reviewed. Constitutional:       General: She is not in acute distress. Appearance: Normal appearance. She is well-developed. She is not ill-appearing. HENT:      Head: Normocephalic and atraumatic. Mouth/Throat:      Mouth: Mucous membranes are moist.      Pharynx: Oropharynx is clear. Neck:      Vascular: No JVD. Trachea: No tracheal deviation. Cardiovascular:      Rate and Rhythm: Normal rate and regular rhythm. Heart sounds: Normal heart sounds. No murmur heard. Pulmonary:      Effort: Pulmonary effort is normal. No respiratory distress. Breath sounds: Normal breath sounds. Genitourinary:     Comments: Tampon saturated in blood noted in vaginal canal adjacent to the cervix. It was removed without difficulty. Small amount of blood in the vaginal canal.  No obvious discharge. Cervix is closed. Skin:     General: Skin is warm and dry. Capillary Refill: Capillary refill takes less than 2 seconds. Coloration: Skin is not pale. Findings: No erythema.    Neurological:      Mental Status: She is alert and

## 2021-08-18 ENCOUNTER — OFFICE VISIT (OUTPATIENT)
Dept: PODIATRY | Age: 41
End: 2021-08-18
Payer: MEDICAID

## 2021-08-18 VITALS — WEIGHT: 293 LBS | BODY MASS INDEX: 44.41 KG/M2 | HEIGHT: 68 IN

## 2021-08-18 DIAGNOSIS — M79.671 RIGHT FOOT PAIN: ICD-10-CM

## 2021-08-18 DIAGNOSIS — M77.50 TENDONITIS OF ANKLE OR FOOT: ICD-10-CM

## 2021-08-18 DIAGNOSIS — R60.0 LOCALIZED EDEMA: ICD-10-CM

## 2021-08-18 DIAGNOSIS — Q66.70 PES CAVUS, CONGENITAL: Primary | ICD-10-CM

## 2021-08-18 DIAGNOSIS — M79.672 LEFT FOOT PAIN: ICD-10-CM

## 2021-08-18 DIAGNOSIS — R26.2 DIFFICULTY WALKING: ICD-10-CM

## 2021-08-18 PROCEDURE — 99203 OFFICE O/P NEW LOW 30 MIN: CPT | Performed by: PODIATRIST

## 2021-08-18 PROCEDURE — G8417 CALC BMI ABV UP PARAM F/U: HCPCS | Performed by: PODIATRIST

## 2021-08-18 PROCEDURE — 4004F PT TOBACCO SCREEN RCVD TLK: CPT | Performed by: PODIATRIST

## 2021-08-18 PROCEDURE — 29580 STRAPPING UNNA BOOT: CPT | Performed by: PODIATRIST

## 2021-08-18 PROCEDURE — G8427 DOCREV CUR MEDS BY ELIG CLIN: HCPCS | Performed by: PODIATRIST

## 2021-08-18 NOTE — PROGRESS NOTES
Patient is here for bilateral foot pain. Patient states pain has been going on and off for about a year. Patient has bought new shoes, insoles (), and uses lidocaine gel. Patient has done foot exercises.  [id number] Jess Garza DO 8/9/2021  Electronically signed by Jennett Gosselin, LPN on 8/00/3586 at 68:76 AM

## 2021-08-19 NOTE — PROGRESS NOTES
female     Right    Corneal abrasion     Ear ache     Elevated glucose level 11/3/2016    Elevated liver enzymes     Fatty liver 11/23/2016    From ultrasound of 11/23/16    Fatty liver disease, nonalcoholic 31/60/2652    Gastroenteritis     GERD (gastroesophageal reflux disease) 2010    Heart palpitations     History of stress test 02/08/2018    Hypokalemia     Irregular heartbeat     Migraine     Migraines     With Menstruation cycles    Miscarriage 2012    Morbid obesity with BMI of 40.0-44.9, adult ([address]     MRSA infection of labia 2011    MVP (mitral valve prolapse) 2004    per DILIA    Panic attacks     PCOS (polycystic ovarian syndrome) 2008    Pilonidal cyst     Rapid heart beat     Sinus tachycardia     SOBOE (shortness of breath on exertion)     Tobacco abuse     Vitamin D deficiency      Family History   Problem Relation Age of Onset    Heart Attack Father 54    Cancer Father 61        Lung    Heart Attack Paternal Grandfather 48    Cancer Maternal Aunt 40        Bone    Breast Cancer Maternal Aunt 25    COPD Maternal Grandfather      Past Surgical History:   Procedure Laterality Date    BREAST LUMPECTOMY Right 1995    Benign    COLONOSCOPY  11/02/2011    Alyne Seun    PILONIDAL CYST EXCISION  [address] [place] [address] Dr Davidson    TONSILLECTOMY  1991    TRANSESOPHAGEAL ECHOCARDIOGRAM  2004    showed MVP    UPPER GASTROINTESTINAL ENDOSCOPY N/A 12/28/2018    EGD BIOPSY performed by Willy Odonnell MD at [address] History     Tobacco Use    Smoking status: Current Every Day Smoker     Packs/day: 1.00     Types: Cigarettes    Smokeless tobacco: Never Used    Tobacco comment: started smoking age 14 and smoked up to 2 ppd since   Vaping Use    Vaping Use: Never used   Substance Use Topics    Alcohol use:  Yes     Alcohol/week: 0.0 standard drinks     Comment: \"Weekend drinker\"    Drug use: No     Comment: denies           Diagnostic studies:    XR FOOT LEFT (MIN 3 VIEWS)    Result Date: 8/18/2021  EXAMINATION: THREE XRAY VIEWS OF THE RIGHT FOOT; THREE XRAY VIEWS OF THE LEFT FOOT 8/18/2021 10:19 am COMPARISON: None. HISTORY: ORDERING SYSTEM PROVIDED HISTORY: Right foot pain FINDINGS: RIGHT FOOT: Radiographs of the right foot demonstrate no fractures with preserved relative alignment. Mild pes planus deformity. No erosive changes. Minimal right large toe metatarsal phalangeal joint degenerative spurring. Minimal anterior tibiotalar joint degenerative spurring. Anterior and posterior calcaneal enthesophytes. Osseous mineralization is normal.  No soft tissue abnormality. LEFT FOOT: Radiographs of the left foot demonstrate no fractures with preserved alignment. No erosive changes. Minimal left large toe metatarsal phalangeal joint degenerative spurring. Anterior and posterior calcaneal enthesophytes. Osseous mineralization is normal.  No soft tissue abnormality. 1.  Bilateral large toe and right tibiotalar joint osteoarthritis. Bilateral calcaneal enthesophytes. 2.  Mild pes planus deformity on the right. XR FOOT RIGHT (MIN 3 VIEWS)    Result Date: 8/18/2021  EXAMINATION: THREE XRAY VIEWS OF THE RIGHT FOOT; THREE XRAY VIEWS OF THE LEFT FOOT 8/18/2021 10:19 am COMPARISON: None. HISTORY: ORDERING SYSTEM PROVIDED HISTORY: Right foot pain FINDINGS: RIGHT FOOT: Radiographs of the right foot demonstrate no fractures with preserved relative alignment. Mild pes planus deformity. No erosive changes. Minimal right large toe metatarsal phalangeal joint degenerative spurring. Minimal anterior tibiotalar joint degenerative spurring. Anterior and posterior calcaneal enthesophytes. Osseous mineralization is normal.  No soft tissue abnormality. LEFT FOOT: Radiographs of the left foot demonstrate no fractures with preserved alignment. No erosive changes. Minimal left large toe metatarsal phalangeal joint degenerative spurring.   Anterior and posterior calcaneal enthesophytes. Osseous mineralization is normal.  No soft tissue abnormality. 1.  Bilateral large toe and right tibiotalar joint osteoarthritis. Bilateral calcaneal enthesophytes. 2.  Mild pes planus deformity on the right. Procedures: An unna boot  compressive wrap was applied to the left lower extremity. It's purpose is to  decrease  the amount of edema present, and to allow proper healing of the soft tissues. The patient was instructed to keep the unna boot clean, dry and intact until the next follow up visit. The patient was instructed to look for signs of redness, irritation, blistering and pain. If these or any other symptoms were to develop, they were advised to contact the office immediately for reevaluation. Exam:  VASCULAR: Pedal pulses palpable bilateral foot. Capillary fill time brisk digits 1 through 5 bilateral foot  NEUROLOGICAL: Epicritic sensations intact and symmetrical  DERMATOLOGICAL: Mild edematous issues noted to both lower extremities left greater than right. No skin abrasions or any signs of infection noted to both lower extremities. No plantar calluses or heel fissuring noted bilateral foot. MUSCULOSKELETAL: Pes cavus foot type noted bilaterally. Tenderness noted into the lateral peroneal tendon region left greater than right. Mild antalgic gait noted upon evaluation. Plan Per Assessment  Angelina Hgih was seen today for foot pain. Diagnoses and all orders for this visit:    Pes cavus, congenital  -     Amb External Referral For Orthotics    Tendonitis of ankle or foot  -     Amb External Referral For Orthotics    Localized edema  -     Amb External Referral For Orthotics    Right foot pain  -     XR FOOT RIGHT (MIN 3 VIEWS); Future  -     Amb External Referral For Orthotics    Left foot pain  -     XR FOOT LEFT (MIN 3 VIEWS); Future  -     Amb External Referral For Orthotics    Difficulty walking  -     Amb External Referral For Orthotics        1.  New patient evaluation and management  2. X-ray studies were ordered and reviewed with patient in detail today. No acute osseous abnormalities noted. 3. Compression dressing was applied to the symptomatic left lower extremity on today's visit. 4. Patient was given a prescription today to get evaluated for custom foot orthoses which would help alleviate her current issues. 5. Patient will be followed up at a later date for continued evaluation and management. Seen By:    Myrna Silva DPM    Electronically signed by Myrna Silva DPM on 8/19/2021 at 5:27 PM      This note was created using voice recognition software. The note was reviewed however may contain grammatical errors.

## 2021-10-04 ENCOUNTER — HOSPITAL ENCOUNTER (EMERGENCY)
Age: 41
Discharge: LEFT BEFORE DISCHARGE | End: 2021-10-04
Payer: MEDICAID

## 2021-10-04 VITALS
DIASTOLIC BLOOD PRESSURE: 77 MMHG | HEIGHT: 68 IN | BODY MASS INDEX: 42.28 KG/M2 | OXYGEN SATURATION: 94 % | WEIGHT: 279 LBS | RESPIRATION RATE: 16 BRPM | SYSTOLIC BLOOD PRESSURE: 111 MMHG | TEMPERATURE: 97.4 F | HEART RATE: 88 BPM

## 2021-10-04 NOTE — ED NOTES
Patient called to let me know that she had left the waiting area for home and would call her PMD in am.       Vito Haynes RN  10/04/21 2576

## 2021-10-14 ENCOUNTER — APPOINTMENT (OUTPATIENT)
Dept: CT IMAGING | Age: 41
End: 2021-10-14
Payer: MEDICAID

## 2021-10-14 ENCOUNTER — APPOINTMENT (OUTPATIENT)
Dept: GENERAL RADIOLOGY | Age: 41
End: 2021-10-14
Payer: MEDICAID

## 2021-10-14 ENCOUNTER — HOSPITAL ENCOUNTER (EMERGENCY)
Age: 41
Discharge: LEFT BEFORE DISCHARGE | End: 2021-10-14
Attending: EMERGENCY MEDICINE
Payer: MEDICAID

## 2021-10-14 VITALS
RESPIRATION RATE: 14 BRPM | DIASTOLIC BLOOD PRESSURE: 88 MMHG | WEIGHT: 275 LBS | HEIGHT: 68 IN | SYSTOLIC BLOOD PRESSURE: 151 MMHG | OXYGEN SATURATION: 94 % | TEMPERATURE: 97.3 F | BODY MASS INDEX: 41.68 KG/M2 | HEART RATE: 95 BPM

## 2021-10-14 DIAGNOSIS — H10.31 ACUTE BACTERIAL CONJUNCTIVITIS OF RIGHT EYE: ICD-10-CM

## 2021-10-14 DIAGNOSIS — I10 HYPERTENSION, UNSPECIFIED TYPE: ICD-10-CM

## 2021-10-14 DIAGNOSIS — J06.9 VIRAL UPPER RESPIRATORY TRACT INFECTION: Primary | ICD-10-CM

## 2021-10-14 DIAGNOSIS — R74.8 ABNORMAL AST AND ALT: ICD-10-CM

## 2021-10-14 DIAGNOSIS — N93.9 VAGINAL BLEEDING: ICD-10-CM

## 2021-10-14 DIAGNOSIS — H92.01 RIGHT EAR PAIN: ICD-10-CM

## 2021-10-14 DIAGNOSIS — J32.9 CHRONIC SINUSITIS, UNSPECIFIED LOCATION: ICD-10-CM

## 2021-10-14 LAB
ABO/RH: NORMAL
ALBUMIN SERPL-MCNC: 4.4 G/DL (ref 3.5–5.2)
ALP BLD-CCNC: 84 U/L (ref 35–104)
ALT SERPL-CCNC: 89 U/L (ref 0–32)
ANION GAP SERPL CALCULATED.3IONS-SCNC: 16 MMOL/L (ref 7–16)
ANTIBODY SCREEN: NORMAL
AST SERPL-CCNC: 100 U/L (ref 0–31)
BASOPHILS ABSOLUTE: 0.04 E9/L (ref 0–0.2)
BASOPHILS RELATIVE PERCENT: 0.4 % (ref 0–2)
BILIRUB SERPL-MCNC: 0.6 MG/DL (ref 0–1.2)
BUN BLDV-MCNC: 11 MG/DL (ref 6–20)
CALCIUM SERPL-MCNC: 8.9 MG/DL (ref 8.6–10.2)
CHLORIDE BLD-SCNC: 104 MMOL/L (ref 98–107)
CO2: 23 MMOL/L (ref 22–29)
CREAT SERPL-MCNC: 0.6 MG/DL (ref 0.5–1)
EKG ATRIAL RATE: 77 BPM
EKG P AXIS: -11 DEGREES
EKG P-R INTERVAL: 164 MS
EKG Q-T INTERVAL: 404 MS
EKG QRS DURATION: 82 MS
EKG QTC CALCULATION (BAZETT): 457 MS
EKG R AXIS: 8 DEGREES
EKG T AXIS: 21 DEGREES
EKG VENTRICULAR RATE: 77 BPM
EOSINOPHILS ABSOLUTE: 0.11 E9/L (ref 0.05–0.5)
EOSINOPHILS RELATIVE PERCENT: 1.1 % (ref 0–6)
GFR AFRICAN AMERICAN: >60
GFR NON-AFRICAN AMERICAN: >60 ML/MIN/1.73
GLUCOSE BLD-MCNC: 95 MG/DL (ref 74–99)
HCG, URINE, POC: NEGATIVE
HCT VFR BLD CALC: 45.8 % (ref 34–48)
HEMOGLOBIN: 15.5 G/DL (ref 11.5–15.5)
IMMATURE GRANULOCYTES #: 0.08 E9/L
IMMATURE GRANULOCYTES %: 0.8 % (ref 0–5)
LACTIC ACID: 2 MMOL/L (ref 0.5–2.2)
LYMPHOCYTES ABSOLUTE: 2 E9/L (ref 1.5–4)
LYMPHOCYTES RELATIVE PERCENT: 20.5 % (ref 20–42)
Lab: NORMAL
MCH RBC QN AUTO: 32.3 PG (ref 26–35)
MCHC RBC AUTO-ENTMCNC: 33.8 % (ref 32–34.5)
MCV RBC AUTO: 95.4 FL (ref 80–99.9)
MONOCYTES ABSOLUTE: 0.72 E9/L (ref 0.1–0.95)
MONOCYTES RELATIVE PERCENT: 7.4 % (ref 2–12)
NEGATIVE QC PASS/FAIL: NORMAL
NEUTROPHILS ABSOLUTE: 6.82 E9/L (ref 1.8–7.3)
NEUTROPHILS RELATIVE PERCENT: 69.8 % (ref 43–80)
PDW BLD-RTO: 16 FL (ref 11.5–15)
PLATELET # BLD: 181 E9/L (ref 130–450)
PMV BLD AUTO: 9.7 FL (ref 7–12)
POSITIVE QC PASS/FAIL: NORMAL
POTASSIUM REFLEX MAGNESIUM: 3.8 MMOL/L (ref 3.5–5)
RBC # BLD: 4.8 E12/L (ref 3.5–5.5)
SODIUM BLD-SCNC: 143 MMOL/L (ref 132–146)
TOTAL PROTEIN: 7.3 G/DL (ref 6.4–8.3)
TROPONIN, HIGH SENSITIVITY: 7 NG/L (ref 0–9)
WBC # BLD: 9.8 E9/L (ref 4.5–11.5)

## 2021-10-14 PROCEDURE — 86900 BLOOD TYPING SEROLOGIC ABO: CPT

## 2021-10-14 PROCEDURE — 90715 TDAP VACCINE 7 YRS/> IM: CPT | Performed by: PHYSICIAN ASSISTANT

## 2021-10-14 PROCEDURE — 84484 ASSAY OF TROPONIN QUANT: CPT

## 2021-10-14 PROCEDURE — 6370000000 HC RX 637 (ALT 250 FOR IP): Performed by: PHYSICIAN ASSISTANT

## 2021-10-14 PROCEDURE — 70450 CT HEAD/BRAIN W/O DYE: CPT

## 2021-10-14 PROCEDURE — 86850 RBC ANTIBODY SCREEN: CPT

## 2021-10-14 PROCEDURE — 80053 COMPREHEN METABOLIC PANEL: CPT

## 2021-10-14 PROCEDURE — U0003 INFECTIOUS AGENT DETECTION BY NUCLEIC ACID (DNA OR RNA); SEVERE ACUTE RESPIRATORY SYNDROME CORONAVIRUS 2 (SARS-COV-2) (CORONAVIRUS DISEASE [COVID-19]), AMPLIFIED PROBE TECHNIQUE, MAKING USE OF HIGH THROUGHPUT TECHNOLOGIES AS DESCRIBED BY CMS-2020-01-R: HCPCS

## 2021-10-14 PROCEDURE — 6360000002 HC RX W HCPCS: Performed by: PHYSICIAN ASSISTANT

## 2021-10-14 PROCEDURE — 93005 ELECTROCARDIOGRAM TRACING: CPT | Performed by: EMERGENCY MEDICINE

## 2021-10-14 PROCEDURE — 85025 COMPLETE CBC W/AUTO DIFF WBC: CPT

## 2021-10-14 PROCEDURE — 71046 X-RAY EXAM CHEST 2 VIEWS: CPT

## 2021-10-14 PROCEDURE — U0005 INFEC AGEN DETEC AMPLI PROBE: HCPCS

## 2021-10-14 PROCEDURE — 86901 BLOOD TYPING SEROLOGIC RH(D): CPT

## 2021-10-14 PROCEDURE — 99284 EMERGENCY DEPT VISIT MOD MDM: CPT

## 2021-10-14 PROCEDURE — 90471 IMMUNIZATION ADMIN: CPT | Performed by: PHYSICIAN ASSISTANT

## 2021-10-14 PROCEDURE — 36415 COLL VENOUS BLD VENIPUNCTURE: CPT

## 2021-10-14 PROCEDURE — 83605 ASSAY OF LACTIC ACID: CPT

## 2021-10-14 RX ORDER — TETRACAINE HYDROCHLORIDE 5 MG/ML
1 SOLUTION OPHTHALMIC ONCE
Status: COMPLETED | OUTPATIENT
Start: 2021-10-14 | End: 2021-10-14

## 2021-10-14 RX ADMIN — TETANUS TOXOID, REDUCED DIPHTHERIA TOXOID AND ACELLULAR PERTUSSIS VACCINE, ADSORBED 0.5 ML: 5; 2.5; 8; 8; 2.5 SUSPENSION INTRAMUSCULAR at 09:22

## 2021-10-14 RX ADMIN — FLUORESCEIN SODIUM 1 EACH: 0.6 STRIP OPHTHALMIC at 10:07

## 2021-10-14 RX ADMIN — TETRACAINE HYDROCHLORIDE 1 DROP: 5 SOLUTION OPHTHALMIC at 10:06

## 2021-10-14 ASSESSMENT — PAIN DESCRIPTION - ORIENTATION: ORIENTATION: RIGHT

## 2021-10-14 ASSESSMENT — PAIN DESCRIPTION - DESCRIPTORS: DESCRIPTORS: PRESSURE

## 2021-10-14 ASSESSMENT — PAIN SCALES - GENERAL: PAINLEVEL_OUTOF10: 5

## 2021-10-14 ASSESSMENT — PAIN DESCRIPTION - LOCATION: LOCATION: EAR

## 2021-10-14 ASSESSMENT — PAIN DESCRIPTION - PAIN TYPE: TYPE: ACUTE PAIN

## 2021-10-14 NOTE — ED NOTES
Visual acuity lt eye 20/15 rt eye 20/15 both eyes 20/13 corrected with glasses.      Renee Dukes RN  10/14/21 1022

## 2021-10-14 NOTE — ED PROVIDER NOTES
ED Attending shared visit  CC: Olivia       [facility]  Department of Emergency Medicine   ED  Encounter Note  Admit Date/RoomTime: 10/14/2021  8:00 AM  ED Room:     NAME: Hilary Norris  : 1980  MRN: [id number]     Chief Complaint:  Headache (frontal headache since this am), Cough (x3 wks), Otalgia (rt ear ), Other (loss of taste), and Eye Problem (itching watery eyes)    History of Present Illness       Hilary Norris is a 37 y.o. old female who presents to the emergency department by private vehicle, for R ear pain, bilateral eye redness and itching, frontal HA, and cough. Patient notes that 3 weeks her daughter was diagnosed with Covid. She states that she has been exposed to her daughter as well as several coworkers who had [place]. She states that her and her  were tested for Covid and were both negative with a rapid Covid test several weeks ago. Patient is not vaccinated. She states that since then she has had a productive cough and right ear pain for 3 weeks. She notes some dizziness with the ear pain but denies falling or lightheadedness. She states that for the past 4 days she has had redness and crusting to both eyes with right greater than left. She notes that she does wear contacts. She is unsure when her last tetanus shot was. She states that she has stopped wearing her contacts as much but still wears them when she has to work. She has tried some eyedrops that she had at home but is unsure what they are. She denies any foreign body exposure, feelings of foreign body in the eye, chemical exposure to the eye, changes in her vision, eye pain, or pain with eye movement. She also notes that today she woke up with a frontal headache. She states that she does have a history of migraines but normally they are in the posterior region. She states that this feels like pressure in the frontal region. She denies worst headache of her life or sudden onset.  She had taken both Omnicef and Augmentin for these sxs. Pt notes that she is a smoker and drinks alcohol. She denies hitting her head or other head trauma. She denies asthma or COPD. Pt also notes she has had intermittent vaginal bleeding for over a year. She states that her last period was on September 20 and she is still been bleeding since then. However, she states that it is very slow and not heavy. She states that she only uses 2 tampons a day. She denies soaking through tampons or pads. She notes that she has seen her gynecologist and PCP for this and they are not concerned. She denies any changes in the bleeding. Patient denies fever, chills, nausea, vomiting, abdominal pain, vaginal discharge, dysuria, urinary frequency, urinary urgency, chest pain, shortness of breath, hemoptysis, pain with inspiration, neck pain, sore throat, visual changes, hearing changes, speech changes, eye pain, pain with eye movement, numbness, tingling, weakness, lightheadedness or dizziness. ROS   Pertinent positives and negatives are stated within HPI, all other systems reviewed and are negative. Past Medical History:  has a past medical history of Acute alcohol intoxication ([address] Acute bronchitis, Alcohol abuse, Anxiety and depression, Benzodiazepine abuse ([address] Breast lump in female, Corneal abrasion, Ear ache, Elevated glucose level, Elevated liver enzymes, Fatty liver, Fatty liver disease, nonalcoholic, Gastroenteritis, GERD (gastroesophageal reflux disease), Heart palpitations, History of stress test, Hypokalemia, Irregular heartbeat, Migraine, Migraines, Miscarriage, Morbid obesity with BMI of 40.0-44.9, adult ([address] MRSA infection of labia, MVP (mitral valve prolapse), Panic attacks, PCOS (polycystic ovarian syndrome), Pilonidal cyst, Rapid heart beat, Sinus tachycardia, SOBOE (shortness of breath on exertion), Tobacco abuse, and Vitamin D deficiency.     Surgical History:  has a past surgical history that includes Tonsillectomy (1991); Breast lumpectomy (Right, 1995); Pilonidal cyst excision (2000); transesophageal echocardiogram (2004); Colonoscopy (11/02/2011); and Upper gastrointestinal endoscopy (N/A, 12/28/2018). Social History:  reports that she has been smoking cigarettes. She has been smoking about 1.00 pack per day. She has never used smokeless tobacco. She reports current alcohol use. She reports that she does not use drugs. Family History: family history includes Breast Cancer (age of onset: 22) in her maternal aunt; COPD in her maternal grandfather; Cancer (age of onset: 36) in her maternal aunt; Cancer (age of onset: 61) in her father; Heart Attack (age of onset: 48) in her paternal grandfather; Heart Attack (age of onset: 54) in her father. Allergies: Reglan [metoclopramide]    Physical Exam   Oxygen Saturation Interpretation: Normal on room air analysis. ED Triage Vitals [10/14/21 0741]   BP Temp Temp Source Pulse Resp SpO2 Height Weight   (!) 151/88 97.3 °F (36.3 °C) Temporal 95 14 94 % 5' 8\" (1.727 m) 275 lb (124.7 kg)     · Constitutional:  Alert, development consistent with age. Head:  NC/NT. · Ears:  External Ears: Bilateral normal.  NO mastoid tenderness bilaterally             TM's & External Canals: normal TM's and external ear canals bilaterally. NO erythema, dullness, bulging, or retractions  · Nose:   There is no discharge, swelling or lesions noted. Eyes:         Pupils: equal, round, reactive to light and accommodation. Eyelids: Bilateral upper and lower Swelling/redness:  no swelling or erythema. Conjunctiva: Right injected(red). Sclera: Bilateral normal appearing. Cornea: Bilateral normal. No abrasions, ulcers, FB; negative Pete's sign       EOM:  Intact Bilaterally. NO pain with EOM  Fundoscopic:  not well visualized. Visual Acuity:  20/15 R eye and 20/15 L eye; 20/13 bilateral eyes with glasses. · Sinuses: mild Bilateral maxillary sinus tenderness. mild Bilateral frontal sinus tenderness. · Mouth:  normal tongue and buccal mucosa. · Throat: no erythema or exudates noted. Teeth and gums normal..  Airway Patent. · Neck:  Supple. No meningeal signs. There is no  preauricular, submental, parotid, anterior cervical, posterior cervical and supraclavicular node tenderness. · Respiratory:   Breath sounds: Bilateral normal.  Lung sounds: normal. No wheezes, rhonchi, or stridor; not in respiratory distress  · CV:  Regular rate and rhythm, normal heart sounds, without pathological murmurs, ectopy, gallops, or rubs. · GI:  Abdomen Soft, nontender, good bowel sounds. No firm or pulsatile mass. · Integument:  Normal turgor. Warm, dry, without visible rash. Neurological:  Oriented x 3, GCS 15. CNII-XII grossly intact. Motor functions intact.  Moves all extremities x 4; strength 5/5 to BUE and BLE    Lab / Imaging Results   (All laboratory and radiology results have been personally reviewed by myself)  Labs:  Results for orders placed or performed during the hospital encounter of 10/14/21   CBC Auto Differential   Result Value Ref Range    WBC 9.8 4.5 - 11.5 E9/L    RBC 4.80 3.50 - 5.50 E12/L    Hemoglobin 15.5 11.5 - 15.5 g/dL    Hematocrit 45.8 34.0 - 48.0 %    MCV 95.4 80.0 - 99.9 fL    MCH 32.3 26.0 - 35.0 pg    MCHC 33.8 32.0 - 34.5 %    RDW 16.0 (H) 11.5 - 15.0 fL    Platelets 499 556 - 998 E9/L    MPV 9.7 7.0 - 12.0 fL    Neutrophils % 69.8 43.0 - 80.0 %    Immature Granulocytes % 0.8 0.0 - 5.0 %    Lymphocytes % 20.5 20.0 - 42.0 %    Monocytes % 7.4 2.0 - 12.0 %    Eosinophils % 1.1 0.0 - 6.0 %    Basophils % 0.4 0.0 - 2.0 %    Neutrophils Absolute 6.82 1.80 - 7.30 E9/L    Immature Granulocytes # 0.08 E9/L    Lymphocytes Absolute 2.00 1.50 - 4.00 E9/L    Monocytes Absolute 0.72 0.10 - 0.95 E9/L    Eosinophils Absolute 0.11 0.05 - 0.50 E9/L    Basophils Absolute 0.04 0.00 - 0.20 E9/L   Comprehensive Metabolic Panel w/ Reflex to MG   Result Value Ref Range    Sodium 143 132 - 146 mmol/L    Potassium reflex Magnesium 3.8 3.5 - 5.0 mmol/L    Chloride 104 98 - 107 mmol/L    CO2 23 22 - 29 mmol/L    Anion Gap 16 7 - 16 mmol/L    Glucose 95 74 - 99 mg/dL    BUN 11 6 - 20 mg/dL    CREATININE 0.6 0.5 - 1.0 mg/dL    GFR Non-African American >60 >=60 mL/min/1.73    GFR African American >60     Calcium 8.9 8.6 - 10.2 mg/dL    Total Protein 7.3 6.4 - 8.3 g/dL    Albumin 4.4 3.5 - 5.2 g/dL    Total Bilirubin 0.6 0.0 - 1.2 mg/dL    Alkaline Phosphatase 84 35 - 104 U/L    ALT 89 (H) 0 - 32 U/L     (H) 0 - 31 U/L   Troponin   Result Value Ref Range    Troponin, High Sensitivity 7 0 - 9 ng/L   LACTIC ACID, PLASMA   Result Value Ref Range    Lactic Acid 2.0 0.5 - 2.2 mmol/L   POC Pregnancy Urine Qual   Result Value Ref Range    HCG, Urine, POC Negative Negative    Lot Number [id number]     Positive QC Pass/Fail Pass     Negative QC Pass/Fail Pass    EKG 12 Lead   Result Value Ref Range    Ventricular Rate 77 BPM    Atrial Rate 77 BPM    P-R Interval 164 ms    QRS Duration 82 ms    Q-T Interval 404 ms    QTc Calculation (Bazett) 457 ms    P Axis -11 degrees    R Axis 8 degrees    T Axis 21 degrees   TYPE AND SCREEN   Result Value Ref Range    ABO/Rh O POS     Antibody Screen NEG        Imaging: All Radiology results interpreted by Radiologist unless otherwise noted. XR CHEST (2 VW)   Final Result   No acute process. CT HEAD WO CONTRAST   Final Result   1. Normal brain, no change. 2. Pan chronic sinusitis, unchanged. EKG: This EKG is signed and interpreted by Dr. Anastacia Young.     Rate: 77  Rhythm: Sinus  Interpretation: no acute changes and non-specific EKG  Comparison: stable as compared to patient's most recent EKG    ED Course / Medical Decision Making     Medications   Tetanus-Diphth-Acell Pertussis (BOOSTRIX) injection 0.5 mL (0.5 mLs IntraMUSCular Given 10/14/21 0922)   fluorescein ophthalmic strip 1 each (1 each Both Eyes Given 10/14/21 1007)   tetracaine (TETRAVISC) 0.5 % ophthalmic solution 1 drop (1 drop Both Eyes Given 10/14/21 1006)     Consults:   None    Procedures:   none    Medical Decision Making:   Pt does not want anything for her HA. She presents with viral sxs - HA, R ear pain, conjunctivitis, sinus pressure. Labs/imaging ordered and reviewed above. Also noted she had vaginal bleeding for several weeks which is chronic and her OBGYN is aware. CT head shows chronic sinusitis. No Changes. R ear exam WNL. Staining of eye WNL. Will dc with ABX for conjunctivitis and have her follow up with her eye doctor. COVID was sent out. DC with Tessalon perles for cough. No CP or SOB. No concern for PE at this time. Vitals WNL besides mildly elevated BP. Dr. Brett Lewis spoke with pt about plan. Pt got frustrated about mention of COVID vaccine and eloped prior to dc instructions and ABX given. Unable to give pt discharge instructions or reasons to return. No acute process on labs/imaging. 10/14/2021 / Time:  1055. PATIENT HAS ELOPED FROM DEPARTMENT PRIOR TO DISCHARGE / COMPLETION OF CARE AND DID NOT RETURN.   ------------------------------------------------------------------------------------------------------------------    Assessment     1. Viral upper respiratory tract infection    2. Acute bacterial conjunctivitis of right eye    3. Abnormal AST and ALT    4. Chronic sinusitis, unspecified location    5. Vaginal bleeding    6. Hypertension, unspecified type    7. Right ear pain      Plan   Patient eloped from emergency department before evaluation completed. .  Patient condition is good    New Medications     Discharge Medication List as of 10/14/2021 11:27 AM        Electronically signed by Dulce James PA-C   DD: 10/14/21  **This report was transcribed using voice recognition software. Every effort was made to ensure accuracy; however, inadvertent computerized transcription errors may be present.   END OF ED PROVIDER NOTE Garry Morris PA-C  10/14/21 0401

## 2021-10-14 NOTE — ED NOTES
This rn coming out of room 1. Pt in hallway yelling \" I don't need to be told about the vaccination, I'm leaving. \" this rn was able to have pt stop long enough to pull iv out before she left the er lobby.      Edgard De Paz RN  10/14/21 7734

## 2021-10-15 LAB — SARS-COV-2, PCR: NOT DETECTED

## 2022-01-26 ENCOUNTER — HOSPITAL ENCOUNTER (OUTPATIENT)
Age: 42
Discharge: HOME | End: 2022-01-28

## 2022-01-26 PROCEDURE — 88305 TISSUE EXAM BY PATHOLOGIST: CPT

## 2022-08-17 ENCOUNTER — HOSPITAL ENCOUNTER (EMERGENCY)
Age: 42
Discharge: HOME | End: 2022-08-17
Payer: MEDICAID

## 2022-08-17 VITALS
WEIGHT: 280 LBS | DIASTOLIC BLOOD PRESSURE: 102 MMHG | HEART RATE: 102 BPM | SYSTOLIC BLOOD PRESSURE: 138 MMHG | HEIGHT: 68 IN | TEMPERATURE: 97.5 F | OXYGEN SATURATION: 98 % | RESPIRATION RATE: 18 BRPM | BODY MASS INDEX: 42.44 KG/M2

## 2022-08-17 DIAGNOSIS — L03.116 CELLULITIS OF LEFT THIGH: Primary | ICD-10-CM

## 2022-08-17 PROCEDURE — 6370000000 HC RX 637 (ALT 250 FOR IP): Performed by: PHYSICIAN ASSISTANT

## 2022-08-17 PROCEDURE — 99283 EMERGENCY DEPT VISIT LOW MDM: CPT

## 2022-08-17 RX ORDER — SULFAMETHOXAZOLE AND TRIMETHOPRIM 800; 160 MG/1; MG/1
1 TABLET ORAL 2 TIMES DAILY
Qty: 20 TABLET | Refills: 0 | Status: SHIPPED | OUTPATIENT
Start: 2022-08-17 | End: 2022-08-27

## 2022-08-17 RX ORDER — SULFAMETHOXAZOLE AND TRIMETHOPRIM 800; 160 MG/1; MG/1
1 TABLET ORAL ONCE
Status: COMPLETED | OUTPATIENT
Start: 2022-08-17 | End: 2022-08-17

## 2022-08-17 RX ORDER — ACETAMINOPHEN 325 MG/1
650 TABLET ORAL ONCE
Status: COMPLETED | OUTPATIENT
Start: 2022-08-17 | End: 2022-08-17

## 2022-08-17 RX ORDER — CEPHALEXIN 500 MG/1
500 CAPSULE ORAL ONCE
Status: COMPLETED | OUTPATIENT
Start: 2022-08-17 | End: 2022-08-17

## 2022-08-17 RX ORDER — CEPHALEXIN 500 MG/1
500 CAPSULE ORAL 4 TIMES DAILY
Qty: 40 CAPSULE | Refills: 0 | Status: SHIPPED | OUTPATIENT
Start: 2022-08-17 | End: 2022-08-27

## 2022-08-17 RX ADMIN — SULFAMETHOXAZOLE AND TRIMETHOPRIM 1 TABLET: 800; 160 TABLET ORAL at 03:41

## 2022-08-17 RX ADMIN — ACETAMINOPHEN 650 MG: 325 TABLET ORAL at 03:41

## 2022-08-17 RX ADMIN — CEPHALEXIN 500 MG: 500 CAPSULE ORAL at 03:41

## 2022-08-17 ASSESSMENT — PAIN DESCRIPTION - ORIENTATION
ORIENTATION: LEFT
ORIENTATION: LEFT

## 2022-08-17 ASSESSMENT — PAIN - FUNCTIONAL ASSESSMENT: PAIN_FUNCTIONAL_ASSESSMENT: 0-10

## 2022-08-17 ASSESSMENT — PAIN SCALES - GENERAL: PAINLEVEL_OUTOF10: 5

## 2022-08-17 ASSESSMENT — PAIN DESCRIPTION - DESCRIPTORS
DESCRIPTORS: ACHING
DESCRIPTORS: ACHING

## 2022-08-17 ASSESSMENT — PAIN DESCRIPTION - LOCATION: LOCATION: GROIN

## 2022-08-17 NOTE — ED PROVIDER NOTES
She reports current alcohol use. She reports that she does not use drugs. Family History: family history includes Breast Cancer (age of onset: 22) in her maternal aunt; COPD in her maternal grandfather; Cancer (age of onset: 36) in her maternal aunt; Cancer (age of onset: 61) in her father; Heart Attack (age of onset: 48) in her paternal grandfather; Heart Attack (age of onset: 54) in her father. The patients home medications have been reviewed. Allergies: Reglan [metoclopramide]    -------------------------------------------------- RESULTS -------------------------------------------------  All laboratory and radiology results have been personally reviewed by myself   LABS:  No results found for this visit on 08/17/22. RADIOLOGY:  Interpreted by Radiologist.  No orders to display       ------------------------- NURSING NOTES AND VITALS REVIEWED ---------------------------   The nursing notes within the ED encounter and vital signs as below have been reviewed. BP (!) 138/102   Pulse (!) 102   Temp 97.5 °F (36.4 °C) (Oral)   Resp 18   Ht 5' 8\" (1.727 m)   Wt 280 lb (127 kg)   SpO2 98%   BMI 42.57 kg/m²   Oxygen Saturation Interpretation: Normal      ---------------------------------------------------PHYSICAL EXAM--------------------------------------      Constitutional/General: Alert and oriented x3, well appearing, non toxic in NAD  Head: Normocephalic and atraumatic  Eyes: PERRL, EOMI  Mouth: Oropharynx clear, handling secretions, no trismus  Neck: Supple, full ROM,   Pulmonary: Lungs clear to auscultation bilaterally, no wheezes, rales, or rhonchi. Not in respiratory distress  Cardiovascular:  Regular rate and rhythm, no murmurs, gallops, or rubs. 2+ distal pulses  Abdomen: Soft, non tender, non distended,   Extremities: Moves all extremities x 4. Warm and well perfused  Skin: warm and dry left upper posterior thigh with 2 cm circumferential indurated area. There is no fluctuance present. No lymphangitis present  Neurologic: GCS 15,  Psych: Normal Affect      ------------------------------ ED COURSE/MEDICAL DECISION MAKING----------------------  Medications   sulfamethoxazole-trimethoprim (BACTRIM DS;SEPTRA DS) 800-160 MG per tablet 1 tablet (1 tablet Oral Given 8/17/22 0341)   cephALEXin (KEFLEX) capsule 500 mg (500 mg Oral Given 8/17/22 0341)   acetaminophen (TYLENOL) tablet 650 mg (650 mg Oral Given 8/17/22 0341)         ED COURSE:       Medical Decision Making:    Patient comes in with complaint of left upper leg erythema. She had concern for possible abscess. Area appeared to be indurated there is no fluctuance present at this time. She was placed on Keflex Bactrim Tylenol Motrin as needed for pain. She is to follow-up with her primary care 1 to 2 days any worsening symptoms return to the ER    Counseling: The emergency provider has spoken with the patient and discussed todays results, in addition to providing specific details for the plan of care and counseling regarding the diagnosis and prognosis. Questions are answered at this time and they are agreeable with the plan.      --------------------------------- IMPRESSION AND DISPOSITION ---------------------------------    IMPRESSION  1. Cellulitis of left thigh        DISPOSITION  Disposition: Discharge to home  Patient condition is good      NOTE: This report was transcribed using voice recognition software.  Every effort was made to ensure accuracy; however, inadvertent computerized transcription errors may be present           Lew Kinsey  08/17/22 8364

## 2022-08-17 NOTE — ED NOTES
While going over discharge instructions with pt, pt became argumentative and disagreeable with the diagnosis she received. I continued to go over discharge and medication instructions with pt, pt increasing became argumentative and stated :she would go home and juan luis the abscess she suspected on her own. I advised the pt against this and that if she felt continuing concern to follow up with her pcp or return to the ER.        Chaitanya Art RN  08/17/22 6116

## 2022-09-18 ENCOUNTER — HOSPITAL ENCOUNTER (EMERGENCY)
Age: 42
Discharge: HOME | End: 2022-09-18
Attending: EMERGENCY MEDICINE
Payer: MEDICAID

## 2022-09-18 VITALS
SYSTOLIC BLOOD PRESSURE: 134 MMHG | OXYGEN SATURATION: 95 % | TEMPERATURE: 98.2 F | RESPIRATION RATE: 18 BRPM | DIASTOLIC BLOOD PRESSURE: 90 MMHG | HEART RATE: 89 BPM

## 2022-09-18 DIAGNOSIS — R00.2 PALPITATIONS: ICD-10-CM

## 2022-09-18 DIAGNOSIS — F10.939 ALCOHOL WITHDRAWAL SYNDROME WITH COMPLICATION (HCC): Primary | ICD-10-CM

## 2022-09-18 LAB
ALBUMIN SERPL-MCNC: 4.4 G/DL (ref 3.5–5.2)
ALP BLD-CCNC: 70 U/L (ref 35–104)
ALT SERPL-CCNC: 144 U/L (ref 0–32)
ANION GAP SERPL CALCULATED.3IONS-SCNC: 14 MMOL/L (ref 7–16)
AST SERPL-CCNC: 177 U/L (ref 0–31)
BACTERIA: ABNORMAL /HPF
BASOPHILS ABSOLUTE: 0.03 E9/L (ref 0–0.2)
BASOPHILS RELATIVE PERCENT: 0.4 % (ref 0–2)
BILIRUB SERPL-MCNC: 1.1 MG/DL (ref 0–1.2)
BILIRUBIN URINE: ABNORMAL
BLOOD, URINE: ABNORMAL
BUN BLDV-MCNC: 8 MG/DL (ref 6–20)
CALCIUM SERPL-MCNC: 9.9 MG/DL (ref 8.6–10.2)
CHLORIDE BLD-SCNC: 99 MMOL/L (ref 98–107)
CLARITY: ABNORMAL
CO2: 25 MMOL/L (ref 22–29)
COLOR: ABNORMAL
CREAT SERPL-MCNC: 0.6 MG/DL (ref 0.5–1)
EKG ATRIAL RATE: 105 BPM
EKG P AXIS: 50 DEGREES
EKG P-R INTERVAL: 182 MS
EKG Q-T INTERVAL: 354 MS
EKG QRS DURATION: 84 MS
EKG QTC CALCULATION (BAZETT): 467 MS
EKG R AXIS: 55 DEGREES
EKG T AXIS: 20 DEGREES
EKG VENTRICULAR RATE: 105 BPM
EOSINOPHILS ABSOLUTE: 0.14 E9/L (ref 0.05–0.5)
EOSINOPHILS RELATIVE PERCENT: 1.7 % (ref 0–6)
EPITHELIAL CELLS, UA: ABNORMAL /HPF
GFR AFRICAN AMERICAN: >60
GFR NON-AFRICAN AMERICAN: >60 ML/MIN/1.73
GLUCOSE BLD-MCNC: 113 MG/DL (ref 74–99)
GLUCOSE URINE: NEGATIVE MG/DL
HCG, URINE, POC: NEGATIVE
HCT VFR BLD CALC: 44.7 % (ref 34–48)
HEMOGLOBIN: 15.6 G/DL (ref 11.5–15.5)
IMMATURE GRANULOCYTES #: 0.04 E9/L
IMMATURE GRANULOCYTES %: 0.5 % (ref 0–5)
KETONES, URINE: 15 MG/DL
LEUKOCYTE ESTERASE, URINE: NEGATIVE
LYMPHOCYTES ABSOLUTE: 1.49 E9/L (ref 1.5–4)
LYMPHOCYTES RELATIVE PERCENT: 18.2 % (ref 20–42)
Lab: NORMAL
MCH RBC QN AUTO: 34.2 PG (ref 26–35)
MCHC RBC AUTO-ENTMCNC: 34.9 % (ref 32–34.5)
MCV RBC AUTO: 98 FL (ref 80–99.9)
MONOCYTES ABSOLUTE: 0.61 E9/L (ref 0.1–0.95)
MONOCYTES RELATIVE PERCENT: 7.4 % (ref 2–12)
MUCUS: PRESENT /LPF
NEGATIVE QC PASS/FAIL: NORMAL
NEUTROPHILS ABSOLUTE: 5.89 E9/L (ref 1.8–7.3)
NEUTROPHILS RELATIVE PERCENT: 71.8 % (ref 43–80)
NITRITE, URINE: NEGATIVE
PDW BLD-RTO: 13.9 FL (ref 11.5–15)
PH UA: 6 (ref 5–9)
PLATELET # BLD: 158 E9/L (ref 130–450)
PMV BLD AUTO: 10.2 FL (ref 7–12)
POSITIVE QC PASS/FAIL: NORMAL
POTASSIUM REFLEX MAGNESIUM: 3.6 MMOL/L (ref 3.5–5)
PRO-BNP: 14 PG/ML (ref 0–125)
PROTEIN UA: 100 MG/DL
RBC # BLD: 4.56 E12/L (ref 3.5–5.5)
RBC UA: ABNORMAL /HPF (ref 0–2)
SODIUM BLD-SCNC: 138 MMOL/L (ref 132–146)
SPECIFIC GRAVITY UA: >=1.03 (ref 1–1.03)
TOTAL PROTEIN: 7.2 G/DL (ref 6.4–8.3)
UROBILINOGEN, URINE: 1 E.U./DL
WBC # BLD: 8.2 E9/L (ref 4.5–11.5)
WBC UA: ABNORMAL /HPF (ref 0–5)

## 2022-09-18 PROCEDURE — 85025 COMPLETE CBC W/AUTO DIFF WBC: CPT

## 2022-09-18 PROCEDURE — 83880 ASSAY OF NATRIURETIC PEPTIDE: CPT

## 2022-09-18 PROCEDURE — 99284 EMERGENCY DEPT VISIT MOD MDM: CPT

## 2022-09-18 PROCEDURE — 81001 URINALYSIS AUTO W/SCOPE: CPT

## 2022-09-18 PROCEDURE — 6360000002 HC RX W HCPCS: Performed by: STUDENT IN AN ORGANIZED HEALTH CARE EDUCATION/TRAINING PROGRAM

## 2022-09-18 PROCEDURE — 80053 COMPREHEN METABOLIC PANEL: CPT

## 2022-09-18 PROCEDURE — 96365 THER/PROPH/DIAG IV INF INIT: CPT

## 2022-09-18 PROCEDURE — 2580000003 HC RX 258: Performed by: STUDENT IN AN ORGANIZED HEALTH CARE EDUCATION/TRAINING PROGRAM

## 2022-09-18 PROCEDURE — 93005 ELECTROCARDIOGRAM TRACING: CPT | Performed by: STUDENT IN AN ORGANIZED HEALTH CARE EDUCATION/TRAINING PROGRAM

## 2022-09-18 RX ORDER — CHLORDIAZEPOXIDE HYDROCHLORIDE 25 MG/1
25 CAPSULE, GELATIN COATED ORAL 3 TIMES DAILY PRN
Qty: 30 CAPSULE | Refills: 0 | Status: SHIPPED | OUTPATIENT
Start: 2022-09-18 | End: 2022-10-18

## 2022-09-18 RX ORDER — 0.9 % SODIUM CHLORIDE 0.9 %
1000 INTRAVENOUS SOLUTION INTRAVENOUS ONCE
Status: COMPLETED | OUTPATIENT
Start: 2022-09-18 | End: 2022-09-18

## 2022-09-18 RX ADMIN — PHENOBARBITAL SODIUM 260 MG: 65 INJECTION INTRAMUSCULAR; INTRAVENOUS at 10:34

## 2022-09-18 RX ADMIN — SODIUM CHLORIDE 1000 ML: 9 INJECTION, SOLUTION INTRAVENOUS at 09:57

## 2022-09-18 ASSESSMENT — ENCOUNTER SYMPTOMS
PHOTOPHOBIA: 0
TROUBLE SWALLOWING: 0
ABDOMINAL PAIN: 0
NAUSEA: 0
DIARRHEA: 0
SHORTNESS OF BREATH: 0
VOMITING: 0
VOICE CHANGE: 0
COUGH: 0

## 2022-09-18 NOTE — ED PROVIDER NOTES
HPI   Patient is a 76-year-old female with past medical history of obesity, MVP, alcohol abuse, anxiety and GERD presenting the emergency department due to palpitations. Patient states that she had high blood pressure and feeling that her heart was beating fast at home just prior to arrival to the ED. She endorses gradual onset of symptoms with progressive worsening. Patient states that her symptoms are slightly improving since coming into the emergency department. She is also endorsing associated chills. Patient does have history of alcohol abuse with withdrawal and is unsure symptoms are related to this. She initially thought this was anxiety but she took her entire anxiety medications at home which were not helpful. Patient otherwise denying any other symptoms including chest pain, shortness of breath, nausea, vomiting, diaphoresis, fever, headache, lightheadedness, syncope, urinary symptoms, constipation, diarrhea, abdominal pain, myalgias, cough, congestion or sore throat. No PE risk factors noted including hx of VTE, recent surgery/immobilization, leg swelling, hemoptysis, syncope, or hormonal medication use. Unknown when patient's last alcoholic beverage was. Her symptoms are mild to moderate with no remitting or exacerbating factors. Patient appears clinically well on initial evaluation and in no apparent distress. Review of Systems   Constitutional:  Positive for chills. Negative for fever. HENT:  Negative for congestion, trouble swallowing and voice change. Eyes:  Negative for photophobia and visual disturbance. Respiratory:  Negative for cough and shortness of breath. Cardiovascular:  Positive for palpitations. Negative for chest pain. Gastrointestinal:  Negative for abdominal pain, diarrhea, nausea and vomiting. Genitourinary:  Negative for dysuria, flank pain, frequency and urgency. Musculoskeletal:  Negative for arthralgias. Skin:  Negative for rash and wound. significant differences compared to previous EKG. Wandering baseline. MDM   Patient is a 81-year-old female with past medical history of obesity, MVP, alcohol abuse, anxiety and GERD presenting the emergency department due to palpitations. Initial evaluation, patient is well-appearing, afebrile, hemodynamically stable in no acute distress. Physical exam is benign. Work-up in the emergency department was unremarkable. EKG sinus with no acute ischemic changes. No clinically significant lab abnormalities were seen. Patient does have elevated LFTs but likely associated with her chronic alcohol use. She was given phenobarbital and IV fluids in the emergency department. Symptoms significantly improved on reevaluation. Suspect acute alcohol withdrawal without complications. Plan to discharge the patient with outpatient follow-up as needed. Patient will be given prescription for Librium and instructed on how to take medication. Work-up and results were discussed with the patient and she was agreeable with this plan. ED Course as of 09/18/22 1959   Sun Sep 18, 2022   1123 Reevaluated. Work-up results discussed with her and she was agreeable to discharge with outpatient follow-up as needed. Explained Librium dosing with patient and she is agreeable with this plan. We will follow-up with PCP if needed. [PP]      ED Course User Index  [PP] Ravi Salinas DO      ED Course as of 09/18/22 2005   Sun Sep 18, 2022   1123 Reevaluated. Work-up results discussed with her and she was agreeable to discharge with outpatient follow-up as needed. Explained Librium dosing with patient and she is agreeable with this plan. We will follow-up with PCP if needed.  [PP]      ED Course User Index  [PP] Ravi Salinas DO       --------------------------------------------- PAST HISTORY ---------------------------------------------  Past Medical History:  has a past medical history of Acute alcohol intoxication ([address] Acute 11.5 - 15.5 g/dL    Hematocrit 44.7 34.0 - 48.0 %    MCV 98.0 80.0 - 99.9 fL    MCH 34.2 26.0 - 35.0 pg    MCHC 34.9 (H) 32.0 - 34.5 %    RDW 13.9 11.5 - 15.0 fL    Platelets 433 859 - 430 E9/L    MPV 10.2 7.0 - 12.0 fL    Neutrophils % 71.8 43.0 - 80.0 %    Immature Granulocytes % 0.5 0.0 - 5.0 %    Lymphocytes % 18.2 (L) 20.0 - 42.0 %    Monocytes % 7.4 2.0 - 12.0 %    Eosinophils % 1.7 0.0 - 6.0 %    Basophils % 0.4 0.0 - 2.0 %    Neutrophils Absolute 5.89 1.80 - 7.30 E9/L    Immature Granulocytes # 0.04 E9/L    Lymphocytes Absolute 1.49 (L) 1.50 - 4.00 E9/L    Monocytes Absolute 0.61 0.10 - 0.95 E9/L    Eosinophils Absolute 0.14 0.05 - 0.50 E9/L    Basophils Absolute 0.03 0.00 - 0.20 E9/L   Comprehensive Metabolic Panel w/ Reflex to MG   Result Value Ref Range    Sodium 138 132 - 146 mmol/L    Potassium reflex Magnesium 3.6 3.5 - 5.0 mmol/L    Chloride 99 98 - 107 mmol/L    CO2 25 22 - 29 mmol/L    Anion Gap 14 7 - 16 mmol/L    Glucose 113 (H) 74 - 99 mg/dL    BUN 8 6 - 20 mg/dL    Creatinine 0.6 0.5 - 1.0 mg/dL    GFR Non-African American >60 >=60 mL/min/1.73    GFR African American >60     Calcium 9.9 8.6 - 10.2 mg/dL    Total Protein 7.2 6.4 - 8.3 g/dL    Albumin 4.4 3.5 - 5.2 g/dL    Total Bilirubin 1.1 0.0 - 1.2 mg/dL    Alkaline Phosphatase 70 35 - 104 U/L     (H) 0 - 32 U/L     (H) 0 - 31 U/L   Brain Natriuretic Peptide   Result Value Ref Range    Pro-BNP 14 0 - 125 pg/mL   Urinalysis with Microscopic   Result Value Ref Range    Color, UA DARK YELLOW (A) Straw/Yellow    Clarity, UA SL CLOUDY Clear    Glucose, Ur Negative Negative mg/dL    Bilirubin Urine MODERATE (A) Negative    Ketones, Urine 15 (A) Negative mg/dL    Specific Gravity, UA >=1.030 1.005 - 1.030    Blood, Urine TRACE-LYSED Negative    pH, UA 6.0 5.0 - 9.0    Protein,  (A) Negative mg/dL    Urobilinogen, Urine 1.0 <2.0 E.U./dL    Nitrite, Urine Negative Negative    Leukocyte Esterase, Urine Negative Negative    Mucus, UA Present (A) None Seen /LPF    WBC, UA 1-3 0 - 5 /HPF    RBC, UA 0-1 0 - 2 /HPF    Epithelial Cells, UA MANY /HPF    Bacteria, UA FEW (A) None Seen /HPF   POC Pregnancy Urine Qual   Result Value Ref Range    HCG, Urine, POC Negative Negative    Lot Number [id number]     Positive QC Pass/Fail Pass     Negative QC Pass/Fail Pass    EKG 12 Lead   Result Value Ref Range    Ventricular Rate 105 BPM    Atrial Rate 105 BPM    P-R Interval 182 ms    QRS Duration 84 ms    Q-T Interval 354 ms    QTc Calculation (Bazett) 467 ms    P Axis 50 degrees    R Axis 55 degrees    T Axis 20 degrees       Radiology:  No orders to display       ------------------------- NURSING NOTES AND VITALS REVIEWED ---------------------------  Date / Time Roomed:  9/18/2022  9:08 AM  ED Bed Assignment:  10/10    The nursing notes within the ED encounter and vital signs as below have been reviewed. BP (!) 134/90   Pulse 89   Temp 98.2 °F (36.8 °C)   Resp 18   SpO2 95%   Oxygen Saturation Interpretation: Normal      ------------------------------------------ PROGRESS NOTES ------------------------------------------  I have spoken with the patient and discussed todays results, in addition to providing specific details for the plan of care and counseling regarding the diagnosis and prognosis. Their questions are answered at this time and they are agreeable with the plan. I discussed at length with them reasons for immediate return here for re evaluation. They will followup with primary care by calling their office tomorrow. --------------------------------- ADDITIONAL PROVIDER NOTES ---------------------------------  At this time the patient is without objective evidence of an acute process requiring hospitalization or inpatient management. They have remained hemodynamically stable throughout their entire ED visit and are stable for discharge with outpatient follow-up.      The plan has been discussed in detail and they are aware of the specific conditions for emergent return, as well as the importance of follow-up. Discharge Medication List as of 9/18/2022 11:24 AM        START taking these medications    Details   chlordiazePOXIDE (LIBRIUM) 25 MG capsule Take 1 capsule by mouth 3 times daily as needed for Anxiety for up to 30 days. , Disp-30 capsule, R-0Normal             Diagnosis:  1. Alcohol withdrawal syndrome with complication ([facility] [address]    2. Palpitations        Disposition:  Patient's disposition: Discharge to home  Patient's condition is stable.              Bg Dukes DO  Resident  09/18/22 2005

## 2023-01-13 ENCOUNTER — HOSPITAL ENCOUNTER (EMERGENCY)
Age: 43
Discharge: HOME | End: 2023-01-14
Attending: EMERGENCY MEDICINE
Payer: MEDICAID

## 2023-01-13 VITALS
WEIGHT: 275 LBS | DIASTOLIC BLOOD PRESSURE: 86 MMHG | SYSTOLIC BLOOD PRESSURE: 109 MMHG | TEMPERATURE: 97.6 F | HEART RATE: 87 BPM | HEIGHT: 68 IN | OXYGEN SATURATION: 92 % | BODY MASS INDEX: 41.68 KG/M2 | RESPIRATION RATE: 18 BRPM

## 2023-01-13 DIAGNOSIS — L02.91 ABSCESS: Primary | ICD-10-CM

## 2023-01-13 PROCEDURE — 99283 EMERGENCY DEPT VISIT LOW MDM: CPT

## 2023-01-13 PROCEDURE — 87205 SMEAR GRAM STAIN: CPT

## 2023-01-13 PROCEDURE — 87186 SC STD MICRODIL/AGAR DIL: CPT

## 2023-01-13 PROCEDURE — 56405 I&D VULVA/PERINEAL ABSCESS: CPT

## 2023-01-13 PROCEDURE — 87077 CULTURE AEROBIC IDENTIFY: CPT

## 2023-01-13 PROCEDURE — 2500000003 HC RX 250 WO HCPCS: Performed by: EMERGENCY MEDICINE

## 2023-01-13 PROCEDURE — 87070 CULTURE OTHR SPECIMN AEROBIC: CPT

## 2023-01-13 PROCEDURE — 6370000000 HC RX 637 (ALT 250 FOR IP): Performed by: EMERGENCY MEDICINE

## 2023-01-13 RX ORDER — LIDOCAINE HYDROCHLORIDE 10 MG/ML
5 INJECTION, SOLUTION INFILTRATION; PERINEURAL ONCE
Status: COMPLETED | OUTPATIENT
Start: 2023-01-13 | End: 2023-01-13

## 2023-01-13 RX ORDER — DOXYCYCLINE HYCLATE 100 MG/1
100 CAPSULE ORAL ONCE
Status: COMPLETED | OUTPATIENT
Start: 2023-01-13 | End: 2023-01-13

## 2023-01-13 RX ORDER — DOXYCYCLINE HYCLATE 100 MG
100 TABLET ORAL 2 TIMES DAILY
Qty: 14 TABLET | Refills: 0 | Status: SHIPPED | OUTPATIENT
Start: 2023-01-13 | End: 2023-01-20

## 2023-01-13 RX ADMIN — DOXYCYCLINE HYCLATE 100 MG: 100 CAPSULE ORAL at 22:54

## 2023-01-13 RX ADMIN — LIDOCAINE HYDROCHLORIDE 5 ML: 10 INJECTION, SOLUTION INFILTRATION; PERINEURAL at 22:55

## 2023-01-14 NOTE — ED PROVIDER NOTES
[facility] ENCOUNTER        Pt Name: James Aguilar  MRN: [id number]  [place] 1980  Date of evaluation: 1/13/2023  Provider: Wanda Hartman DO  PCP: Rowena Lopez DO  Note Started: 11:41 PM EST 1/13/23    CHIEF COMPLAINT       Chief Complaint   Patient presents with    Alcohol Intoxication     Pt is not her for intoxication but for the pain of the abscess. Pt was placed in wheelchair because of her gait      Abscess     Is in the groin area       HISTORY OF PRESENT ILLNESS: 1 or more Elements   History From: Patient    Limitations to history : None    James Aguilar is a 40 y.o. female who presents to the emergency department for a abscess to the left labia minora that started 4 days ago. The complaint is persistent, moderate severity, nothing makes better or worse. Patient tried to juan luis it at home but was unsuccessful, with increasing pain she came emergency department for evaluation. She also notes drinking half bottle of vodka today and is intoxicated. She reports that she was just trying to enjoy her day although she was not using the alcohol for pain control. She has not tried taking anything at home. Patient denies fever, chills, headache, shortness of breath, chest pain, abdominal pain, nausea, vomiting, diarrhea, lightheadedness, dysuria, hematuria, hematochezia, and melena. Nursing Notes were all reviewed and agreed with or any disagreements were addressed in the HPI. REVIEW OF SYSTEMS :           Positives and Pertinent negatives as per HPI.      SURGICAL HISTORY     Past Surgical History:   Procedure Laterality Date    BREAST LUMPECTOMY Right 1995    Benign    COLONOSCOPY  11/02/2011    Sayed Yossef-normal    PILONIDAL CYST EXCISION  2000    St     TONSILLECTOMY  1991    TRANSESOPHAGEAL ECHOCARDIOGRAM  2004    showed MVP    UPPER GASTROINTESTINAL ENDOSCOPY N/A 12/28/2018    EGD BIOPSY performed by Elie Reagan MD at [facility]       Previous Medications    ACETAMINOPHEN (TYLENOL) 500 MG TABLET    Take 2 tablets by mouth 3 times daily for 7 days    ALPRAZOLAM (XANAX) 1 MG TABLET    Take 1 mg by mouth as needed for Anxiety. CLOBETASOL (TEMOVATE) 0.05 % OINTMENT    Apply bid for 2 weeks then qd    ELETRIPTAN (RELPAX) 40 MG TABLET    Take 1 tablet by mouth once as needed (migraine) may repeat in 2 hours if necessary. IBUPROFEN (ADVIL;MOTRIN) 600 MG TABLET    Take 1 tablet by mouth 3 times daily as needed for Pain or Fever    METOPROLOL TARTRATE 75 MG TABS    TAKE 1 TABLET BY MOUTH TWICE DAILY    NALTREXONE HCL PO    Take by mouth    NONFORMULARY    Take by mouth every 30 days Oil of Oregano        ALLERGIES     Reglan [metoclopramide]    FAMILYHISTORY       Family History   Problem Relation Age of Onset    Heart Attack Father 54    Cancer Father 61        Lung    Heart Attack Paternal Grandfather 48    Cancer Maternal Aunt 40        Bone    Breast Cancer Maternal Aunt 25    COPD Maternal Grandfather         SOCIAL HISTORY       Social History     Tobacco Use    Smoking status: Every Day     Packs/day: 1.00     Types: Cigarettes    Smokeless tobacco: Never    Tobacco comments:     started smoking age 14 and smoked up to 2 ppd since   Vaping Use    Vaping Use: Never used   Substance Use Topics    Alcohol use:  Yes     Alcohol/week: 0.0 standard drinks     Comment: \"Weekend drinker\"    Drug use: No     Comment: denies       SCREENINGS        Zain Coma Scale  Eye Opening: Spontaneous  Best Verbal Response: Confused  Best Motor Response: Obeys commands  Zain Coma Scale Score: 14                CIWA Assessment  BP: 109/86  Heart Rate: 87           PHYSICAL EXAM  1 or more Elements     ED Triage Vitals   BP Temp Temp src Heart Rate Resp SpO2 Height Weight   01/13/23 2030 01/13/23 2030 -- 01/13/23 2030 01/13/23 2030 01/13/23 2030 01/13/23 2222 01/13/23 2222   109/86 97.6 °F (36.4 °C)  87 18 92 % 5' 8\" (1.727 m) 275 lb (124.7 kg)            Constitutional/General: Alert and oriented x3. Slurred speech consistent with intoxication  Head: Normocephalic and atraumatic  Eyes: PERRL, EOMI, conjunctiva normal, sclera non icteric  ENT:  Oropharynx clear, handling secretions, no trismus, no asymmetry of the posterior oropharynx or uvular edema  Neck: Supple, full ROM, no stridor, no meningeal signs  Respiratory: Lungs clear to auscultation bilaterally, no wheezes, rales, or rhonchi. Not in respiratory distress  Cardiovascular:  Regular rate. Regular rhythm. No murmurs, no gallops, no rubs. 2+ distal pulses. Equal extremity pulses. Chest: No chest wall tenderness  GI:  Abdomen Soft, Non tender, Non distended. +BS. No rebound, guarding, or rigidity. No pulsatile masses. Musculoskeletal: Moves all extremities x 4. Warm and well perfused, no clubbing, no cyanosis, no edema. Capillary refill <3 seconds  Integument: skin warm and dry. No rashes. Small 1 cm area of erythema with underlying fluctuance to the 11 o'clock position on her right labia minora. Neurologic: GCS 15, no focal deficits, symmetric strength 5/5 in the upper and lower extremities bilaterally  Psychiatric: Normal Affect            DIAGNOSTIC RESULTS   LABS:    Labs Reviewed   CULTURE, WOUND       As interpreted by me, the above displayed labs are abnormal. All other labs obtained during this visit were within normal range or not returned as of this dictation. PROCEDURES   Unless otherwise noted below, none        PROCEDURE  1/13/23       Time: 1135    INCISION AND DRAINAGE  Risks, benefits and alternatives (for applicable procedures below) described. Performed By: Yola Carrillo DO and EM Attending Physician. Indication: Right labia minora  Informed consent obtained: The patient provided verbal consent for this procedure. .  Prep: The skin was cleansed with hexachlorophene and draped in a sterile fashion.   Local Anesthesia:  obtained with Lidocaine 1% without epinephrine. Incision: The labia minora was Incised by scalpal and moderate fluid was drained. A wound culture was obtained. The wound  was not irrigated and was not packed with iodoform gauze. The wound was then covered with a sterile dressing. Patient tolerated the procedure well. CRITICAL CARE TIME (.cct)   none    PAST MEDICAL HISTORY/Chronic Conditions Affecting Care      has a past medical history of Acute alcohol intoxication ([facility] [address] Acute bronchitis, Alcohol abuse, Anxiety and depression, Benzodiazepine abuse ([facility] [address] Breast lump in female, Corneal abrasion, Ear ache, Elevated glucose level (11/3/2016), Elevated liver enzymes, Fatty liver (11/23/2016), Fatty liver disease, nonalcoholic (77/19/0649), Gastroenteritis, GERD (gastroesophageal reflux disease) (2010), Heart palpitations, History of stress test (02/08/2018), Hypokalemia, Irregular heartbeat, Migraine, Migraines, Miscarriage (2012), Morbid obesity with BMI of 40.0-44.9, adult ([facility] [address] MRSA infection of labia (2011), MVP (mitral valve prolapse) (2004), Panic attacks, PCOS (polycystic ovarian syndrome) (2008), Pilonidal cyst, Rapid heart beat, Sinus tachycardia, SOBOE (shortness of breath on exertion), Tobacco abuse, and Vitamin D deficiency.      EMERGENCY DEPARTMENT COURSE    Vitals:    Vitals:    01/13/23 2030 01/13/23 2222   BP: 109/86    Pulse: 87    Resp: 18    Temp: 97.6 °F (36.4 °C)    SpO2: 92%    Weight:  275 lb (124.7 kg)   Height:  5' 8\" (1.727 m)       Patient was given the following medications:  Medications   lidocaine 1 % injection 5 mL (5 mLs IntraDERmal Given 1/13/23 2255)   doxycycline hyclate (VIBRAMYCIN) capsule 100 mg (100 mg Oral Given 1/13/23 2254)           Medical Decision Making/Differential Diagnosis:    CC/HPI Summary, Social Determinants of health, Records Reviewed, DDx, testing done/not done, ED Course, Reassessment, disposition considerations/shared decision making with patient, consults, disposition:      Patient presented emergency department for an abscess to her labia minora, patient is intoxicated on arrival admitted to drinking half a bottle of vodka prior to arrival.  She denies fever, chills, and does have a history of MRSA. With no systemic symptoms, labs are not indicated at this time. Patient's abscess was lanced and drained, see procedure procedure note above. Patient was administered 1 dose of doxycycline for MRSA coverage with her history of. A wound culture was obtained. Once the patient has a sober , she will be discharged to follow-up with her primary care physician outpatient. She was instructed to keep the area clean with soap and water and continue with her antibiotic coverage. Her tetanus is up-to-date. She will be discharged. ED Course as of 01/13/23 2341 Fri Jan 13, 2023 2328 ATTENDING PROVIDER ATTESTATION:     I have personally performed and/or participated in the history, exam, medical decision making, and procedures and agree with all pertinent clinical information unless otherwise noted. I have also reviewed and agree with the past medical, family and social history unless otherwise noted. I have discussed this patient in detail with the resident and provided the instruction and education regarding the evidence-based evaluation and treatment of Labial abscess, intoxicated. Any EKG that may have been performed has been personally reviewed by me and I agree with the documentation as noted by the resident. History: She reports worsening of an area of swelling in the 11 o'clock position of her groin near her labia majora patient reports a history of MRSA infections. She recently had a mole removal with Dr. Patrice horowitz. She reports no fevers chills or other complaints.   Patient admits to drinking half a bottle of vodka throughout the day she states this is not a regular drinking thing but she had the day off and wanted to have a good time    My findings: Lina Mendiola is a 40 y.o. female whom is in no distress. Physical exam reveals patient is alert and oriented but is definitely intoxicated. She is obese. Exam reveals small 1 to 2 cm x 2 to centimeter area of abscess at the 11 o'clock position outside the labia majora. My plan: Symptomatic and supportive care. With chaperone MARIELA Garcia in room. I&D performed successfully. Patient was started on doxycycline and discharged in the care of her son who is a sober . Electronically signed by Eris Antoine DO on 1/13/23 at 11:28 PM EST       [CF]      ED Course User Index  [CF] Eris Antoine DO              CONSULTS: (Who and What was discussed)  None  none      I am the Primary Clinician of Record. FINAL IMPRESSION      1. Abscess          DISPOSITION/PLAN     DISPOSITION Decision To Discharge 01/13/2023 11:32:58 PM      PATIENT REFERRED TO:  Tanja Trevino DO  [address] [address]  [phone]    Call in 1 day  for follow up      DISCHARGE MEDICATIONS:  New Prescriptions    DOXYCYCLINE HYCLATE (VIBRA-TABS) 100 MG TABLET    Take 1 tablet by mouth 2 times daily for 7 days       DISCONTINUED MEDICATIONS:  Discontinued Medications    No medications on file              (Please note that portions of this note were completed with a voice recognition program.  Efforts were made to edit the dictations but occasionally words are mis-transcribed.)    Ruddy Parker DO (electronically signed)           Ruddy Parker DO  Resident  01/13/23 0823

## 2023-01-14 NOTE — ED NOTES
Patient discharged with sober natali Valencia, [facility]  01/14/23 0004

## 2023-01-15 LAB
GRAM STAIN RESULT: NORMAL
WOUND/ABSCESS: NORMAL

## 2023-01-17 LAB
GRAM STAIN RESULT: ABNORMAL
ORGANISM: ABNORMAL
ORGANISM: ABNORMAL
WOUND/ABSCESS: ABNORMAL
WOUND/ABSCESS: ABNORMAL

## 2023-02-15 ENCOUNTER — APPOINTMENT (OUTPATIENT)
Dept: GENERAL RADIOLOGY | Age: 43
End: 2023-02-15
Payer: MEDICAID

## 2023-02-15 ENCOUNTER — HOSPITAL ENCOUNTER (EMERGENCY)
Age: 43
Discharge: HOME | End: 2023-02-15
Attending: EMERGENCY MEDICINE
Payer: MEDICAID

## 2023-02-15 VITALS
WEIGHT: 275 LBS | DIASTOLIC BLOOD PRESSURE: 106 MMHG | OXYGEN SATURATION: 97 % | RESPIRATION RATE: 18 BRPM | BODY MASS INDEX: 41.68 KG/M2 | HEART RATE: 93 BPM | TEMPERATURE: 98.4 F | SYSTOLIC BLOOD PRESSURE: 157 MMHG | HEIGHT: 68 IN

## 2023-02-15 DIAGNOSIS — F10.930 ALCOHOL WITHDRAWAL SYNDROME WITHOUT COMPLICATION (HCC): Primary | ICD-10-CM

## 2023-02-15 LAB
ACETAMINOPHEN LEVEL: <5 MCG/ML (ref 10–30)
ALBUMIN SERPL-MCNC: 4.1 G/DL (ref 3.5–5.2)
ALP BLD-CCNC: 68 U/L (ref 35–104)
ALT SERPL-CCNC: 96 U/L (ref 0–32)
AMPHETAMINE SCREEN, URINE: NOT DETECTED
ANION GAP SERPL CALCULATED.3IONS-SCNC: 15 MMOL/L (ref 7–16)
AST SERPL-CCNC: 96 U/L (ref 0–31)
BARBITURATE SCREEN URINE: NOT DETECTED
BASOPHILS ABSOLUTE: 0.03 E9/L (ref 0–0.2)
BASOPHILS RELATIVE PERCENT: 0.7 % (ref 0–2)
BENZODIAZEPINE SCREEN, URINE: POSITIVE
BILIRUB SERPL-MCNC: 0.7 MG/DL (ref 0–1.2)
BUN BLDV-MCNC: 9 MG/DL (ref 6–20)
CALCIUM SERPL-MCNC: 8.9 MG/DL (ref 8.6–10.2)
CANNABINOID SCREEN URINE: NOT DETECTED
CHLORIDE BLD-SCNC: 102 MMOL/L (ref 98–107)
CO2: 24 MMOL/L (ref 22–29)
COCAINE METABOLITE SCREEN URINE: NOT DETECTED
CREAT SERPL-MCNC: 0.7 MG/DL (ref 0.5–1)
EOSINOPHILS ABSOLUTE: 0.07 E9/L (ref 0.05–0.5)
EOSINOPHILS RELATIVE PERCENT: 1.7 % (ref 0–6)
ETHANOL: 137 MG/DL (ref 0–0.08)
FENTANYL SCREEN, URINE: NOT DETECTED
GFR SERPL CREATININE-BSD FRML MDRD: >60 ML/MIN/1.73
GLUCOSE BLD-MCNC: 95 MG/DL (ref 74–99)
HCG, URINE, POC: NEGATIVE
HCT VFR BLD CALC: 45.9 % (ref 34–48)
HEMOGLOBIN: 15.4 G/DL (ref 11.5–15.5)
IMMATURE GRANULOCYTES #: 0.01 E9/L
IMMATURE GRANULOCYTES %: 0.2 % (ref 0–5)
INFLUENZA A BY PCR: NOT DETECTED
INFLUENZA B BY PCR: NOT DETECTED
LYMPHOCYTES ABSOLUTE: 1.12 E9/L (ref 1.5–4)
LYMPHOCYTES RELATIVE PERCENT: 27.3 % (ref 20–42)
Lab: ABNORMAL
Lab: NORMAL
MCH RBC QN AUTO: 34 PG (ref 26–35)
MCHC RBC AUTO-ENTMCNC: 33.6 % (ref 32–34.5)
MCV RBC AUTO: 101.3 FL (ref 80–99.9)
METHADONE SCREEN, URINE: NOT DETECTED
MONOCYTES ABSOLUTE: 0.41 E9/L (ref 0.1–0.95)
MONOCYTES RELATIVE PERCENT: 10 % (ref 2–12)
NEGATIVE QC PASS/FAIL: NORMAL
NEUTROPHILS ABSOLUTE: 2.47 E9/L (ref 1.8–7.3)
NEUTROPHILS RELATIVE PERCENT: 60.1 % (ref 43–80)
OPIATE SCREEN URINE: NOT DETECTED
OXYCODONE URINE: NOT DETECTED
PDW BLD-RTO: 13.7 FL (ref 11.5–15)
PHENCYCLIDINE SCREEN URINE: NOT DETECTED
PLATELET # BLD: 156 E9/L (ref 130–450)
PMV BLD AUTO: 9.6 FL (ref 7–12)
POSITIVE QC PASS/FAIL: NORMAL
POTASSIUM REFLEX MAGNESIUM: 3.8 MMOL/L (ref 3.5–5)
RBC # BLD: 4.53 E12/L (ref 3.5–5.5)
SALICYLATE, SERUM: <0.3 MG/DL (ref 0–30)
SARS-COV-2, NAAT: NOT DETECTED
SODIUM BLD-SCNC: 141 MMOL/L (ref 132–146)
TOTAL PROTEIN: 7 G/DL (ref 6.4–8.3)
TRICYCLIC ANTIDEPRESSANTS SCREEN SERUM: NEGATIVE NG/ML
WBC # BLD: 4.1 E9/L (ref 4.5–11.5)

## 2023-02-15 PROCEDURE — 80307 DRUG TEST PRSMV CHEM ANLYZR: CPT

## 2023-02-15 PROCEDURE — 85025 COMPLETE CBC W/AUTO DIFF WBC: CPT

## 2023-02-15 PROCEDURE — 80179 DRUG ASSAY SALICYLATE: CPT

## 2023-02-15 PROCEDURE — 87502 INFLUENZA DNA AMP PROBE: CPT

## 2023-02-15 PROCEDURE — 71045 X-RAY EXAM CHEST 1 VIEW: CPT

## 2023-02-15 PROCEDURE — 96374 THER/PROPH/DIAG INJ IV PUSH: CPT

## 2023-02-15 PROCEDURE — 6360000002 HC RX W HCPCS: Performed by: STUDENT IN AN ORGANIZED HEALTH CARE EDUCATION/TRAINING PROGRAM

## 2023-02-15 PROCEDURE — 99284 EMERGENCY DEPT VISIT MOD MDM: CPT

## 2023-02-15 PROCEDURE — 6370000000 HC RX 637 (ALT 250 FOR IP): Performed by: STUDENT IN AN ORGANIZED HEALTH CARE EDUCATION/TRAINING PROGRAM

## 2023-02-15 PROCEDURE — 87635 SARS-COV-2 COVID-19 AMP PRB: CPT

## 2023-02-15 PROCEDURE — 80053 COMPREHEN METABOLIC PANEL: CPT

## 2023-02-15 PROCEDURE — 82077 ASSAY SPEC XCP UR&BREATH IA: CPT

## 2023-02-15 PROCEDURE — 80143 DRUG ASSAY ACETAMINOPHEN: CPT

## 2023-02-15 RX ORDER — MULTIVITAMIN WITH IRON
1 TABLET ORAL DAILY
Status: DISCONTINUED | OUTPATIENT
Start: 2023-02-15 | End: 2023-02-15 | Stop reason: HOSPADM

## 2023-02-15 RX ORDER — LORAZEPAM 2 MG/ML
1 INJECTION INTRAMUSCULAR
Status: DISCONTINUED | OUTPATIENT
Start: 2023-02-15 | End: 2023-02-15 | Stop reason: HOSPADM

## 2023-02-15 RX ORDER — LORAZEPAM 1 MG/1
2 TABLET ORAL
Status: DISCONTINUED | OUTPATIENT
Start: 2023-02-15 | End: 2023-02-15 | Stop reason: HOSPADM

## 2023-02-15 RX ORDER — LORAZEPAM 2 MG/ML
3 INJECTION INTRAMUSCULAR
Status: DISCONTINUED | OUTPATIENT
Start: 2023-02-15 | End: 2023-02-15 | Stop reason: HOSPADM

## 2023-02-15 RX ORDER — CHLORDIAZEPOXIDE HYDROCHLORIDE 25 MG/1
CAPSULE, GELATIN COATED ORAL
Qty: 20 CAPSULE | Refills: 0 | Status: SHIPPED | OUTPATIENT
Start: 2023-02-15 | End: 2023-02-19

## 2023-02-15 RX ORDER — LANOLIN ALCOHOL/MO/W.PET/CERES
100 CREAM (GRAM) TOPICAL DAILY
Status: DISCONTINUED | OUTPATIENT
Start: 2023-02-15 | End: 2023-02-15 | Stop reason: HOSPADM

## 2023-02-15 RX ORDER — PHENOBARBITAL SODIUM 65 MG/ML
130 INJECTION INTRAMUSCULAR ONCE
Status: COMPLETED | OUTPATIENT
Start: 2023-02-15 | End: 2023-02-15

## 2023-02-15 RX ORDER — SODIUM CHLORIDE 0.9 % (FLUSH) 0.9 %
5-40 SYRINGE (ML) INJECTION EVERY 12 HOURS SCHEDULED
Status: DISCONTINUED | OUTPATIENT
Start: 2023-02-15 | End: 2023-02-15 | Stop reason: HOSPADM

## 2023-02-15 RX ORDER — LORAZEPAM 1 MG/1
4 TABLET ORAL
Status: DISCONTINUED | OUTPATIENT
Start: 2023-02-15 | End: 2023-02-15 | Stop reason: HOSPADM

## 2023-02-15 RX ORDER — SODIUM CHLORIDE 0.9 % (FLUSH) 0.9 %
5-40 SYRINGE (ML) INJECTION PRN
Status: DISCONTINUED | OUTPATIENT
Start: 2023-02-15 | End: 2023-02-15 | Stop reason: HOSPADM

## 2023-02-15 RX ORDER — LORAZEPAM 1 MG/1
3 TABLET ORAL
Status: DISCONTINUED | OUTPATIENT
Start: 2023-02-15 | End: 2023-02-15 | Stop reason: HOSPADM

## 2023-02-15 RX ORDER — LORAZEPAM 2 MG/ML
4 INJECTION INTRAMUSCULAR
Status: DISCONTINUED | OUTPATIENT
Start: 2023-02-15 | End: 2023-02-15 | Stop reason: HOSPADM

## 2023-02-15 RX ORDER — LORAZEPAM 1 MG/1
1 TABLET ORAL
Status: DISCONTINUED | OUTPATIENT
Start: 2023-02-15 | End: 2023-02-15 | Stop reason: HOSPADM

## 2023-02-15 RX ORDER — SODIUM CHLORIDE 9 MG/ML
INJECTION, SOLUTION INTRAVENOUS PRN
Status: DISCONTINUED | OUTPATIENT
Start: 2023-02-15 | End: 2023-02-15 | Stop reason: HOSPADM

## 2023-02-15 RX ORDER — LORAZEPAM 2 MG/ML
2 INJECTION INTRAMUSCULAR
Status: DISCONTINUED | OUTPATIENT
Start: 2023-02-15 | End: 2023-02-15 | Stop reason: HOSPADM

## 2023-02-15 RX ADMIN — LORAZEPAM 1 MG: 1 TABLET ORAL at 13:51

## 2023-02-15 RX ADMIN — PHENOBARBITAL SODIUM 130 MG: 65 INJECTION INTRAMUSCULAR; INTRAVENOUS at 12:18

## 2023-02-15 ASSESSMENT — ENCOUNTER SYMPTOMS
SHORTNESS OF BREATH: 0
EYE PAIN: 1
WHEEZING: 1
EYE REDNESS: 1
NAUSEA: 0
VOMITING: 0
COUGH: 0

## 2023-02-15 NOTE — DISCHARGE INSTRUCTIONS
Day 1- Take 50 mg librium every 6 hours as needed for withdrawal symptoms  Day 2- Take 50 mg librium every 8 hours as needed for withdrawal symptoms  Day 3- Take 50 mg librium every 12 hours as needed for withdrawal symptoms  Day 4- Take 50 mg librium at bedtime as needed for withdrawal symptoms

## 2023-02-15 NOTE — ED NOTES
Lunch tray given. Tolerated well. D/C home. Verbalized understanding. Ambulatory from ED.      Kirk Boyer RN  02/15/23 4472

## 2023-02-15 NOTE — ED PROVIDER NOTES
HPI  40 y.o. female presenting for alcohol problem. Patient states she is a binge drinker and was on a binge from Monday through last night. She states that when she is on binges, she will drink 12 to 24 ounces of vodka per day. She has been through alcohol withdrawal before, but has never been in DTs. She states she has benzodiazepines at home, but did not take any as she was drinking and was concerned of mixing benzos and alcohol. She complains of some wheezing for the past week. She was recently seen at urgent care and diagnosed with eye abrasion and put on Polytrim drops. She complains of feeling anxious like she gets when she goes through withdrawal.  She denies all other complaints at this time. She denies SI or HI.    --------------------------------------------- PAST HISTORY ---------------------------------------------  Past Medical History:  has a past medical history of Acute alcohol intoxication ([facility] Acute bronchitis, Alcohol abuse, Anxiety and depression, Benzodiazepine abuse ([facility] Breast lump in female, Corneal abrasion, Ear ache, Elevated glucose level, Elevated liver enzymes, Fatty liver, Fatty liver disease, nonalcoholic, Gastroenteritis, GERD (gastroesophageal reflux disease), Heart palpitations, History of stress test, Hypokalemia, Irregular heartbeat, Migraine, Migraines, Miscarriage, Morbid obesity with BMI of 40.0-44.9, adult ([facility] MRSA infection of labia, MVP (mitral valve prolapse), Panic attacks, PCOS (polycystic ovarian syndrome), Pilonidal cyst, Rapid heart beat, Sinus tachycardia, SOBOE (shortness of breath on exertion), Tobacco abuse, and Vitamin D deficiency. Past Surgical History:  has a past surgical history that includes Tonsillectomy (1991); Breast lumpectomy (Right, 1995); Pilonidal cyst excision (2000); transesophageal echocardiogram (2004); Colonoscopy (11/02/2011); and Upper gastrointestinal endoscopy (N/A, 12/28/2018).     Social History:  reports that she has been smoking cigarettes. She has been smoking an average of 1 pack per day. She has never used smokeless tobacco. She reports current alcohol use. She reports that she does not use drugs. Family History: family history includes Breast Cancer (age of onset: 22) in her maternal aunt; COPD in her maternal grandfather; Cancer (age of onset: 36) in her maternal aunt; Cancer (age of onset: 61) in her father; Heart Attack (age of onset: 48) in her paternal grandfather; Heart Attack (age of onset: 54) in her father. The patients home medications have been reviewed. Allergies: Reglan [metoclopramide]      Review of Systems   Constitutional:  Negative for fever. Eyes:  Positive for pain and redness. Respiratory:  Positive for wheezing. Negative for cough and shortness of breath. Cardiovascular:  Negative for chest pain. Gastrointestinal:  Negative for nausea and vomiting. Neurological:  Negative for dizziness and headaches. Psychiatric/Behavioral:  The patient is nervous/anxious. Physical Exam  Constitutional:       General: She is not in acute distress. Appearance: She is not ill-appearing. Comments: Mildly tremulous   HENT:      Head: Normocephalic and atraumatic. Eyes:      Extraocular Movements: Extraocular movements intact. Pupils: Pupils are equal, round, and reactive to light. Comments: Right eye injected, no purulent discharge   Cardiovascular:      Rate and Rhythm: Normal rate and regular rhythm. Pulmonary:      Comments: Diminished diffusely, with some scant expiratory wheezes  Abdominal:      General: There is no distension. Palpations: Abdomen is soft. Tenderness: There is no abdominal tenderness. Musculoskeletal:         General: No swelling or deformity. Skin:     General: Skin is warm and dry. Neurological:      General: No focal deficit present. Mental Status: She is alert.    Psychiatric:         Mood and Affect: Mood normal. Procedures        -------------------------------------------------- RESULTS -------------------------------------------------  Labs:  Results for orders placed or performed during the hospital encounter of 02/15/23   COVID-19, Rapid    Specimen: Nasopharyngeal Swab   Result Value Ref Range    SARS-CoV-2, NAAT Not Detected Not Detected   RAPID INFLUENZA A/B ANTIGENS    Specimen: Nasopharyngeal   Result Value Ref Range    Influenza A by PCR Not Detected Not Detected    Influenza B by PCR Not Detected Not Detected   CBC with Auto Differential   Result Value Ref Range    WBC 4.1 (L) 4.5 - 11.5 E9/L    RBC 4.53 3.50 - 5.50 E12/L    Hemoglobin 15.4 11.5 - 15.5 g/dL    Hematocrit 45.9 34.0 - 48.0 %    .3 (H) 80.0 - 99.9 fL    MCH 34.0 26.0 - 35.0 pg    MCHC 33.6 32.0 - 34.5 %    RDW 13.7 11.5 - 15.0 fL    Platelets 354 688 - 374 E9/L    MPV 9.6 7.0 - 12.0 fL    Neutrophils % 60.1 43.0 - 80.0 %    Immature Granulocytes % 0.2 0.0 - 5.0 %    Lymphocytes % 27.3 20.0 - 42.0 %    Monocytes % 10.0 2.0 - 12.0 %    Eosinophils % 1.7 0.0 - 6.0 %    Basophils % 0.7 0.0 - 2.0 %    Neutrophils Absolute 2.47 1.80 - 7.30 E9/L    Immature Granulocytes # 0.01 E9/L    Lymphocytes Absolute 1.12 (L) 1.50 - 4.00 E9/L    Monocytes Absolute 0.41 0.10 - 0.95 E9/L    Eosinophils Absolute 0.07 0.05 - 0.50 E9/L    Basophils Absolute 0.03 0.00 - 0.20 E9/L   Comprehensive Metabolic Panel w/ Reflex to MG   Result Value Ref Range    Sodium 141 132 - 146 mmol/L    Potassium reflex Magnesium 3.8 3.5 - 5.0 mmol/L    Chloride 102 98 - 107 mmol/L    CO2 24 22 - 29 mmol/L    Anion Gap 15 7 - 16 mmol/L    Glucose 95 74 - 99 mg/dL    BUN 9 6 - 20 mg/dL    Creatinine 0.7 0.5 - 1.0 mg/dL    Est, Glom Filt Rate >60 >=60 mL/min/1.73    Calcium 8.9 8.6 - 10.2 mg/dL    Total Protein 7.0 6.4 - 8.3 g/dL    Albumin 4.1 3.5 - 5.2 g/dL    Total Bilirubin 0.7 0.0 - 1.2 mg/dL    Alkaline Phosphatase 68 35 - 104 U/L    ALT 96 (H) 0 - 32 U/L    AST 96 (H) 0 - 31 U/L   Serum Drug Screen   Result Value Ref Range    Ethanol Lvl 137 mg/dL    Acetaminophen Level <5.0 (L) 10.0 - 01.4 mcg/mL    Salicylate, Serum <6.5 0.0 - 30.0 mg/dL    TCA Scrn NEGATIVE Cutoff:300 ng/mL   URINE DRUG SCREEN   Result Value Ref Range    Amphetamine Screen, Urine NOT DETECTED Negative <1000 ng/mL    Barbiturate Screen, Ur NOT DETECTED Negative < 200 ng/mL    Benzodiazepine Screen, Urine POSITIVE (A) Negative < 200 ng/mL    Cannabinoid Scrn, Ur NOT DETECTED Negative < 50ng/mL    Cocaine Metabolite Screen, Urine NOT DETECTED Negative < 300 ng/mL    Opiate Scrn, Ur NOT DETECTED Negative < 300ng/mL    PCP Screen, Urine NOT DETECTED Negative < 25 ng/mL    Methadone Screen, Urine NOT DETECTED Negative <300 ng/mL    Oxycodone Urine NOT DETECTED Negative <100 ng/mL    FENTANYL SCREEN, URINE NOT DETECTED Negative <1 ng/mL    Drug Screen Comment: see below    POC Pregnancy Urine Qual   Result Value Ref Range    HCG, Urine, POC Negative Negative    Lot Number [id number]     Positive QC Pass/Fail Pass     Negative QC Pass/Fail Pass        Radiology:  XR CHEST PORTABLE   Final Result   No acute process. ------------------------- NURSING NOTES AND VITALS REVIEWED ---------------------------  Date / Time Roomed:  2/15/2023 10:16 AM  ED Bed Assignment:  LIU/LIU    The nursing notes within the ED encounter and vital signs as below have been reviewed. BP (!) 157/106   Pulse 93   Temp 98.4 °F (36.9 °C)   Resp 18   Ht 5' 8\" (1.727 m)   Wt 275 lb (124.7 kg)   SpO2 97%   BMI 41.81 kg/m²   Oxygen Saturation Interpretation: Normal    Medical Decision Making  39 yo female with history of alcohol abuse presenting for alcohol withdrawal. EtOH 137. CXR was obtained to assess for pneumonia as patient complained of some wheezing for the past week but was negative. COVID and influenza negative. Patient was given 130 mg phenobarbital IV and placed on CIWA protocol.  I had discussion with patient regarding desire for detox or rehab, but patient declined. She states she had been through that before and was not interested at that time. She does not know if she is ready to quit drinking completely yet. She has been treated with vivitrol shots in the past. Patient has no history of DT and VSS. I discussed librium taper with patient, who was agreeable to plan. Patient was discharged with prescription for librium taper. She was advised not to drink while taking librium and she expressed understanding. She was offered resources for rehab/detox, but stated she [facility] vivitrol [facility] and can call them. Patient was discharged with sober ride home and instructions to follow up with PCP and to return to the ED if symptoms worsen. Amount and/or Complexity of Data Reviewed  Labs: ordered. Details: EtOH 137; UDS positive for benzos (given in ED)  Rapid COVID negative  Rapid influenza negative  Radiology: ordered. Details: CXR- negative for focal consolidation or pneumothorax    Risk  OTC drugs. Prescription drug management.        ------------------------------------------ PROGRESS NOTES ------------------------------------------  I have spoken with the patient and discussed todays results, in addition to providing specific details for the plan of care and counseling regarding the diagnosis and prognosis. Their questions are answered at this time and they are agreeable with the plan. I discussed at length with them reasons for immediate return here for re evaluation. They will followup with PCP.      --------------------------------- ADDITIONAL PROVIDER NOTES ---------------------------------  At this time the patient is without objective evidence of an acute process requiring hospitalization or inpatient management. They have remained hemodynamically stable throughout their entire ED visit and are stable for discharge with outpatient follow-up.      The plan has been discussed in detail and they are aware of the specific conditions for emergent return, as well as the importance of follow-up. Discharge Medication List as of 2/15/2023  3:03 PM        START taking these medications    Details   chlordiazePOXIDE (LIBRIUM) 25 MG capsule Take 2 capsules by mouth every 6 hours as needed for Anxiety for 1 day, THEN 2 capsules every 8 hours as needed for Anxiety for 1 day, THEN 2 capsules every 12 hours as needed for Anxiety for 1 day, THEN 2 capsules nightly as needed for Anxiety for up to  1 day. Max Daily Amount: 200 mg., Disp-20 capsule, R-0Normal             Diagnosis:  1. Alcohol withdrawal syndrome without complication ([facility]        Disposition:  Patient's disposition: Discharge to home  Patient's condition is stable.           Mark Chapin MD  Resident  02/15/23 1509

## 2023-03-09 ENCOUNTER — APPOINTMENT (OUTPATIENT)
Dept: GENERAL RADIOLOGY | Age: 43
End: 2023-03-09
Payer: MEDICAID

## 2023-03-09 ENCOUNTER — HOSPITAL ENCOUNTER (EMERGENCY)
Age: 43
Discharge: LEFT BEFORE DISCHARGE | End: 2023-03-09
Payer: MEDICAID

## 2023-03-09 VITALS
DIASTOLIC BLOOD PRESSURE: 92 MMHG | RESPIRATION RATE: 16 BRPM | HEIGHT: 68 IN | BODY MASS INDEX: 41.68 KG/M2 | WEIGHT: 275 LBS | TEMPERATURE: 99 F | HEART RATE: 96 BPM | SYSTOLIC BLOOD PRESSURE: 126 MMHG | OXYGEN SATURATION: 98 %

## 2023-03-09 DIAGNOSIS — F10.920 ACUTE ALCOHOLIC INTOXICATION WITHOUT COMPLICATION (HCC): ICD-10-CM

## 2023-03-09 DIAGNOSIS — S50.11XA CONTUSION OF RIGHT FOREARM, INITIAL ENCOUNTER: Primary | ICD-10-CM

## 2023-03-09 LAB
BASOPHILS ABSOLUTE: 0.03 E9/L (ref 0–0.2)
BASOPHILS RELATIVE PERCENT: 0.3 % (ref 0–2)
EOSINOPHILS ABSOLUTE: 0.14 E9/L (ref 0.05–0.5)
EOSINOPHILS RELATIVE PERCENT: 1.6 % (ref 0–6)
ETHANOL: 350 MG/DL (ref 0–0.08)
HCT VFR BLD CALC: 45.4 % (ref 34–48)
HEMOGLOBIN: 15.9 G/DL (ref 11.5–15.5)
IMMATURE GRANULOCYTES #: 0.04 E9/L
IMMATURE GRANULOCYTES %: 0.4 % (ref 0–5)
LYMPHOCYTES ABSOLUTE: 2.67 E9/L (ref 1.5–4)
LYMPHOCYTES RELATIVE PERCENT: 29.6 % (ref 20–42)
MCH RBC QN AUTO: 34.6 PG (ref 26–35)
MCHC RBC AUTO-ENTMCNC: 35 % (ref 32–34.5)
MCV RBC AUTO: 98.7 FL (ref 80–99.9)
MONOCYTES ABSOLUTE: 0.99 E9/L (ref 0.1–0.95)
MONOCYTES RELATIVE PERCENT: 11 % (ref 2–12)
NEUTROPHILS ABSOLUTE: 5.14 E9/L (ref 1.8–7.3)
NEUTROPHILS RELATIVE PERCENT: 57.1 % (ref 43–80)
PDW BLD-RTO: 13.7 FL (ref 11.5–15)
PLATELET # BLD: 181 E9/L (ref 130–450)
PMV BLD AUTO: 9.9 FL (ref 7–12)
RBC # BLD: 4.6 E12/L (ref 3.5–5.5)
TOTAL CK: 106 U/L (ref 20–180)
WBC # BLD: 9 E9/L (ref 4.5–11.5)

## 2023-03-09 PROCEDURE — 73090 X-RAY EXAM OF FOREARM: CPT

## 2023-03-09 PROCEDURE — 82077 ASSAY SPEC XCP UR&BREATH IA: CPT

## 2023-03-09 PROCEDURE — 73110 X-RAY EXAM OF WRIST: CPT

## 2023-03-09 PROCEDURE — 85025 COMPLETE CBC W/AUTO DIFF WBC: CPT

## 2023-03-09 PROCEDURE — 82550 ASSAY OF CK (CPK): CPT

## 2023-03-09 PROCEDURE — 99284 EMERGENCY DEPT VISIT MOD MDM: CPT

## 2023-03-09 ASSESSMENT — PAIN SCALES - GENERAL: PAINLEVEL_OUTOF10: 4

## 2023-03-09 ASSESSMENT — PAIN DESCRIPTION - LOCATION: LOCATION: WRIST

## 2023-03-09 ASSESSMENT — PAIN - FUNCTIONAL ASSESSMENT: PAIN_FUNCTIONAL_ASSESSMENT: 0-10

## 2023-03-09 ASSESSMENT — PAIN DESCRIPTION - ORIENTATION: ORIENTATION: LEFT

## 2023-03-09 NOTE — ED NOTES
Pt left ED with son. RN removed IV from hand before leaving ED lobby. Pt states she saw her relsults on my chart and will take care of herself at home. Son of patient is driving vehicle . NP notified.      Irais Burris RN  03/09/23 0993

## 2023-03-09 NOTE — ED PROVIDER NOTES
Independent [facility] Visit. [facility]  Department of Emergency Medicine   ED  Encounter Note  Admit Date/RoomTime: 3/9/2023  2:38 PM  ED Room: 34/34    NAME: Cayden Hanna  : 1980  MRN: [id number]     Chief Complaint:  Arm Injury (Left wrist, pt states that she fell on it 6 days ago, +etoh)    History of Present Illness       Cayden Hanna is a 40 y.o. old female who presents to the emergency department by private vehicle, with a 1 week history of left wrist and forearm pain following a fall while intoxicated, she states that she landed on her left arm, states that it was flexed against her body and she landed chest down, she states that she then \"passed out\" on her left arm. She states that she has had pain to the left distal ulna and midshaft radius since that time. She has not had any difficulty with range of motion but states that she can now not tie her hair up. This is her only complaint. She is not taking thing over-the-counter for the symptoms. Symptoms are worse with movement, better with rest.  She denies striking her head, denies use of blood thinning agents or any loss of consciousness, states that she tripped when she fell and \"just fell asleep\" because she was intoxicated. She rates her pain as a 4 out of 10. ROS   Pertinent positives and negatives are stated within HPI, all other systems reviewed and are negative.     Past Medical History:  has a past medical history of Acute alcohol intoxication ([address] Acute bronchitis, Alcohol abuse, Anxiety and depression, Benzodiazepine abuse ([address] Breast lump in female, Corneal abrasion, Ear ache, Elevated glucose level, Elevated liver enzymes, Fatty liver, Fatty liver disease, nonalcoholic, Gastroenteritis, GERD (gastroesophageal reflux disease), Heart palpitations, History of stress test, Hypokalemia, Irregular heartbeat, Migraine, Migraines, Miscarriage, Morbid obesity with BMI of 40.0-44.9, adult ([address] MRSA infection of labia, MVP (mitral valve prolapse), Panic attacks, PCOS (polycystic ovarian syndrome), Pilonidal cyst, Rapid heart beat, Sinus tachycardia, SOBOE (shortness of breath on exertion), Tobacco abuse, and Vitamin D deficiency. Surgical History:  has a past surgical history that includes Tonsillectomy (1991); Breast lumpectomy (Right, 1995); Pilonidal cyst excision (2000); transesophageal echocardiogram (2004); Colonoscopy (11/02/2011); and Upper gastrointestinal endoscopy (N/A, 12/28/2018). Social History:  reports that she has been smoking cigarettes. She has been smoking an average of 1 pack per day. She has never used smokeless tobacco. She reports current alcohol use of about 5.0 standard drinks per week. She reports that she does not use drugs. Family History: family history includes Breast Cancer (age of onset: 22) in her maternal aunt; COPD in her maternal grandfather; Cancer (age of onset: 36) in her maternal aunt; Cancer (age of onset: 61) in her father; Heart Attack (age of onset: 48) in her paternal grandfather; Heart Attack (age of onset: 54) in her father. Allergies: Reglan [metoclopramide]    Physical Exam   Oxygen Saturation Interpretation: Normal.        ED Triage Vitals [03/09/23 1342]   BP Temp Temp src Heart Rate Resp SpO2 Height Weight   (!) 126/92 99 °F (37.2 °C) -- 96 16 98 % 5' 8\" (1.727 m) 275 lb (124.7 kg)         Constitutional:  Alert, development consistent with age. Neck:  Normal ROM. Supple. Non-tender. Physical Exam  Constitutional:       Appearance: Normal appearance. HENT:      Mouth/Throat:      Mouth: Mucous membranes are moist.   Eyes:      Extraocular Movements: Extraocular movements intact. Pupils: Pupils are equal, round, and reactive to light. Pulmonary:      Effort: Pulmonary effort is normal.      Breath sounds: Normal breath sounds. Musculoskeletal:         General: Normal range of motion.       Cervical back: Normal range of motion and neck supple. Comments: Bilateral upper extremity strength 5 out of 5, bilateral lower extremity strength 5 out of 5. Neurological:      Mental Status: She is alert. Left Wrist: distal radius and distal ulna dorsal aspect. Tenderness:  moderate. Compartments are soft and compressible, no ecchymosis, no soft tissue swelling noted. Swelling: Mild. Deformity: no deformity observed/palpated. ROM: full range of motion, able to abduct and adduct fingers, able to make a fist, able to make the okay sign. Skin:  no wounds, erythema, or swelling. Neurovascular: Motor deficit: none. Sensory deficit:   none. Pulse deficit: none. Capillary refill: normal.  Left Elbow: diffusely across elbow            Tenderness: none              Swelling: None. Deformity: no deformity observed/palpated. ROM: full range of motion. Skin:  no wounds, erythema, or swelling. Left Hand: all metacarpals             Tenderness: none              Swelling: None. Deformity: no deformity observed/palpated. ROM: full range of motion. Skin:  no wounds, erythema, or swelling. Lymphatics: No lymphangitis or adenopathy noted. Neurological:  Oriented. Motor functions intact.     Lab / Imaging Results   (All laboratory and radiology results have been personally reviewed by myself)  Labs:  Results for orders placed or performed during the hospital encounter of 03/09/23   CK   Result Value Ref Range    Total  20 - 180 U/L   Ethanol   Result Value Ref Range    Ethanol Lvl 350 mg/dL   CBC with Auto Differential   Result Value Ref Range    WBC 9.0 4.5 - 11.5 E9/L    RBC 4.60 3.50 - 5.50 E12/L    Hemoglobin 15.9 (H) 11.5 - 15.5 g/dL    Hematocrit 45.4 34.0 - 48.0 %    MCV 98.7 80.0 - 99.9 fL    MCH 34.6 26.0 - 35.0 pg    MCHC 35.0 (H) 32.0 - 34.5 %    RDW 13.7 11.5 - 15.0 fL    Platelets 808 506 - 041 E9/L    MPV 9.9 7.0 - 12.0 fL    Neutrophils % 57.1 43.0 - 80.0 %    Immature Granulocytes % 0.4 0.0 - 5.0 %    Lymphocytes % 29.6 20.0 - 42.0 %    Monocytes % 11.0 2.0 - 12.0 %    Eosinophils % 1.6 0.0 - 6.0 %    Basophils % 0.3 0.0 - 2.0 %    Neutrophils Absolute 5.14 1.80 - 7.30 E9/L    Immature Granulocytes # 0.04 E9/L    Lymphocytes Absolute 2.67 1.50 - 4.00 E9/L    Monocytes Absolute 0.99 (H) 0.10 - 0.95 E9/L    Eosinophils Absolute 0.14 0.05 - 0.50 E9/L    Basophils Absolute 0.03 0.00 - 0.20 E9/L     Imaging: All Radiology results interpreted by Radiologist unless otherwise noted. XR RADIUS ULNA LEFT (2 VIEWS)   Final Result   Mild soft tissue swelling at the wrist.  No abnormal osseous findings. XR WRIST LEFT (MIN 3 VIEWS)   Final Result   Mild soft tissue swelling at the wrist.  No abnormal osseous findings. ED Course / Medical Decision Making   Medications - No data to display  ED Course as of 03/09/23 1820   Thu Mar 09, 2023   1520 XR RADIUS ULNA LEFT (2 VIEWS) [CS]   1520 XR RADIUS ULNA LEFT (2 VIEWS)  Radiology studies noted, no acute fracture. [CS]   1620 Ethanol level 350, per nursing staff patient has a ride home. [CS]   1625 CK: Total   CK noted to be negative, minimal concern for rhabdomyolysis [CS]   1818 XR WRIST LEFT (MIN 3 VIEWS) [CS]      ED Course User Index  [CS] Kolby Hinton, APRN - CNP     Medical Decision Making  Otto Singh is a 40 y.o. old female who presents to the emergency department by private vehicle, with a 1 week history of left wrist and forearm pain following a fall while intoxicated, she states that she landed on her left arm, states that it was flexed against her body and she landed chest down, she states that she then \"passed out\" on her left arm. She states that she has had pain to the left distal ulna and midshaft radius since that time.   She has not had any difficulty with range of motion but states that she can now not tie her hair up. This is her only complaint. She is not taking thing over-the-counter for the symptoms. Symptoms are worse with movement, better with rest.  She denies striking her head, denies use of blood thinning agents or any loss of consciousness, states that she tripped when she fell and \"just fell asleep\" because she was intoxicated. She rates her pain as a 4 out of 10. On exam patient is neurovascular intact, radial and ulnar nerves are intact, normal range of motion, mild soft tissue swelling noted to the dorsum of the left forearm with no ecchymosis, compartments are soft and compressible. Differentials considered include acute fracture of the forearm/radius/ulna, concern for alcohol intoxication, rhabdomyolysis as patient laid on the left arm overnight. Amount and/or Complexity of Data Reviewed  Labs: ordered. Decision-making details documented in ED Course. Radiology: ordered. Decision-making details documented in ED Course. Details: X-rays of the left wrist and left forearm are negative for acute fracture. Risk  Risk Details: Patient eloped from the department prior to my ability to discuss results with her. Per nursing staff she stated that she saw her results on her MyChart and felt comfortable discharging herself. Nursing staff report that her son was present to drive her home given her intoxication. 3/9/2023 / Time:  1626 PM.  PATIENT HAS ELOPED FROM DEPARTMENT PRIOR TO DISCHARGE / COMPLETION OF CARE AND DID NOT RETURN.   ------------------------------------------------------------------------------------------------------------------       Assessment      1. Contusion of right forearm, initial encounter    2. Acute alcoholic intoxication without complication ([facility]      Plan   Disposition:   Patient eloped from emergency department before evaluation completed. .  Patient condition is stable    New Medications     New Prescriptions    No medications on file     Electronically signed by KORY Bradford CNP   DD: 3/9/23  **This report was transcribed using voice recognition software. Every effort was made to ensure accuracy; however, inadvertent computerized transcription errors may be present.   END OF ED PROVIDER NOTE      KORY Bradford CNP  03/09/23 [id number]

## 2023-06-14 ENCOUNTER — APPOINTMENT (OUTPATIENT)
Dept: ULTRASOUND IMAGING | Age: 43
End: 2023-06-14
Payer: MEDICAID

## 2023-06-14 ENCOUNTER — HOSPITAL ENCOUNTER (EMERGENCY)
Age: 43
Discharge: HOME | End: 2023-06-14
Payer: MEDICAID

## 2023-06-14 VITALS
RESPIRATION RATE: 16 BRPM | HEART RATE: 73 BPM | BODY MASS INDEX: 39.71 KG/M2 | WEIGHT: 262 LBS | TEMPERATURE: 98.8 F | OXYGEN SATURATION: 98 % | HEIGHT: 68 IN | DIASTOLIC BLOOD PRESSURE: 86 MMHG | SYSTOLIC BLOOD PRESSURE: 123 MMHG

## 2023-06-14 DIAGNOSIS — R10.2 PELVIC PAIN IN FEMALE: Primary | ICD-10-CM

## 2023-06-14 LAB
ABO + RH BLD: NORMAL
ALBUMIN SERPL-MCNC: 4.5 G/DL (ref 3.5–5.2)
ALP SERPL-CCNC: 59 U/L (ref 35–104)
ALT SERPL-CCNC: 56 U/L (ref 0–32)
ANION GAP SERPL CALCULATED.3IONS-SCNC: 20 MMOL/L (ref 7–16)
AST SERPL-CCNC: 61 U/L (ref 0–31)
BACTERIA URNS QL MICRO: NORMAL /HPF
BASOPHILS # BLD: 0.04 E9/L (ref 0–0.2)
BASOPHILS NFR BLD: 0.5 % (ref 0–2)
BILIRUB SERPL-MCNC: 0.5 MG/DL (ref 0–1.2)
BILIRUB UR QL STRIP: NEGATIVE
BLD GP AB SCN SERPL QL: NORMAL
BUN SERPL-MCNC: 12 MG/DL (ref 6–20)
CALCIUM SERPL-MCNC: 9.6 MG/DL (ref 8.6–10.2)
CHLORIDE SERPL-SCNC: 102 MMOL/L (ref 98–107)
CLARITY UR: CLEAR
CO2 SERPL-SCNC: 22 MMOL/L (ref 22–29)
COLOR UR: YELLOW
CREAT SERPL-MCNC: 0.8 MG/DL (ref 0.5–1)
EOSINOPHIL # BLD: 0.12 E9/L (ref 0.05–0.5)
EOSINOPHIL NFR BLD: 1.4 % (ref 0–6)
EPI CELLS #/AREA URNS HPF: NORMAL /HPF
ERYTHROCYTE [DISTWIDTH] IN BLOOD BY AUTOMATED COUNT: 13.3 FL (ref 11.5–15)
GLUCOSE SERPL-MCNC: 102 MG/DL (ref 74–99)
GLUCOSE UR STRIP-MCNC: NEGATIVE MG/DL
HCG, URINE, POC: NEGATIVE
HCT VFR BLD AUTO: 46.1 % (ref 34–48)
HGB BLD-MCNC: 16 G/DL (ref 11.5–15.5)
HGB UR QL STRIP: ABNORMAL
IMM GRANULOCYTES # BLD: 0.03 E9/L
IMM GRANULOCYTES NFR BLD: 0.4 % (ref 0–5)
KETONES UR STRIP-MCNC: >=80 MG/DL
LEUKOCYTE ESTERASE UR QL STRIP: NEGATIVE
LYMPHOCYTES # BLD: 2.13 E9/L (ref 1.5–4)
LYMPHOCYTES NFR BLD: 25.4 % (ref 20–42)
Lab: NORMAL
MAGNESIUM SERPL-MCNC: 1.7 MG/DL (ref 1.6–2.6)
MCH RBC QN AUTO: 34.9 PG (ref 26–35)
MCHC RBC AUTO-ENTMCNC: 34.7 % (ref 32–34.5)
MCV RBC AUTO: 100.4 FL (ref 80–99.9)
MONOCYTES # BLD: 0.76 E9/L (ref 0.1–0.95)
MONOCYTES NFR BLD: 9.1 % (ref 2–12)
NEGATIVE QC PASS/FAIL: NORMAL
NEUTROPHILS # BLD: 5.3 E9/L (ref 1.8–7.3)
NEUTS SEG NFR BLD: 63.2 % (ref 43–80)
NITRITE UR QL STRIP: NEGATIVE
PH UR STRIP: 6 [PH] (ref 5–9)
PLATELET # BLD AUTO: 208 E9/L (ref 130–450)
PMV BLD AUTO: 10.3 FL (ref 7–12)
POSITIVE QC PASS/FAIL: NORMAL
POTASSIUM SERPL-SCNC: 3.5 MMOL/L (ref 3.5–5)
PROT SERPL-MCNC: 7 G/DL (ref 6.4–8.3)
PROT UR STRIP-MCNC: 30 MG/DL
RBC # BLD AUTO: 4.59 E12/L (ref 3.5–5.5)
RBC #/AREA URNS HPF: NORMAL /HPF (ref 0–2)
SODIUM SERPL-SCNC: 144 MMOL/L (ref 132–146)
SP GR UR STRIP: 1.02 (ref 1–1.03)
UROBILINOGEN UR STRIP-ACNC: 1 E.U./DL
WBC # BLD: 8.4 E9/L (ref 4.5–11.5)
WBC #/AREA URNS HPF: NORMAL /HPF (ref 0–5)

## 2023-06-14 PROCEDURE — 76856 US EXAM PELVIC COMPLETE: CPT

## 2023-06-14 PROCEDURE — 80053 COMPREHEN METABOLIC PANEL: CPT

## 2023-06-14 PROCEDURE — 6360000002 HC RX W HCPCS: Performed by: PHYSICIAN ASSISTANT

## 2023-06-14 PROCEDURE — 85025 COMPLETE CBC W/AUTO DIFF WBC: CPT

## 2023-06-14 PROCEDURE — 96374 THER/PROPH/DIAG INJ IV PUSH: CPT

## 2023-06-14 PROCEDURE — 99284 EMERGENCY DEPT VISIT MOD MDM: CPT

## 2023-06-14 PROCEDURE — 86901 BLOOD TYPING SEROLOGIC RH(D): CPT

## 2023-06-14 PROCEDURE — 86850 RBC ANTIBODY SCREEN: CPT

## 2023-06-14 PROCEDURE — 2580000003 HC RX 258: Performed by: PHYSICIAN ASSISTANT

## 2023-06-14 PROCEDURE — 86900 BLOOD TYPING SEROLOGIC ABO: CPT

## 2023-06-14 PROCEDURE — 36415 COLL VENOUS BLD VENIPUNCTURE: CPT

## 2023-06-14 PROCEDURE — 96375 TX/PRO/DX INJ NEW DRUG ADDON: CPT

## 2023-06-14 PROCEDURE — 81001 URINALYSIS AUTO W/SCOPE: CPT

## 2023-06-14 PROCEDURE — 83735 ASSAY OF MAGNESIUM: CPT

## 2023-06-14 RX ORDER — HYDROCODONE BITARTRATE AND ACETAMINOPHEN 5; 325 MG/1; MG/1
1 TABLET ORAL EVERY 6 HOURS PRN
Qty: 8 TABLET | Refills: 0 | Status: SHIPPED | OUTPATIENT
Start: 2023-06-14 | End: 2023-06-17

## 2023-06-14 RX ORDER — 0.9 % SODIUM CHLORIDE 0.9 %
1000 INTRAVENOUS SOLUTION INTRAVENOUS ONCE
Status: COMPLETED | OUTPATIENT
Start: 2023-06-14 | End: 2023-06-14

## 2023-06-14 RX ORDER — ONDANSETRON 2 MG/ML
4 INJECTION INTRAMUSCULAR; INTRAVENOUS ONCE
Status: COMPLETED | OUTPATIENT
Start: 2023-06-14 | End: 2023-06-14

## 2023-06-14 RX ORDER — MORPHINE SULFATE 8 MG/ML
5 INJECTION, SOLUTION INTRAMUSCULAR; INTRAVENOUS ONCE
Status: COMPLETED | OUTPATIENT
Start: 2023-06-14 | End: 2023-06-14

## 2023-06-14 RX ADMIN — SODIUM CHLORIDE 1000 ML: 9 INJECTION, SOLUTION INTRAVENOUS at 12:45

## 2023-06-14 RX ADMIN — ONDANSETRON 4 MG: 2 INJECTION INTRAMUSCULAR; INTRAVENOUS at 12:46

## 2023-06-14 RX ADMIN — MORPHINE SULFATE 5 MG: 8 INJECTION, SOLUTION INTRAMUSCULAR; INTRAVENOUS at 12:47

## 2023-06-14 ASSESSMENT — PAIN SCALES - GENERAL
PAINLEVEL_OUTOF10: 6
PAINLEVEL_OUTOF10: 6

## 2023-06-14 ASSESSMENT — PAIN DESCRIPTION - DESCRIPTORS: DESCRIPTORS: PRESSURE

## 2023-06-14 ASSESSMENT — PAIN DESCRIPTION - ORIENTATION: ORIENTATION: RIGHT;LEFT

## 2023-06-14 ASSESSMENT — PAIN DESCRIPTION - LOCATION
LOCATION: ABDOMEN
LOCATION: ABDOMEN;BACK

## 2023-06-14 ASSESSMENT — PAIN - FUNCTIONAL ASSESSMENT
PAIN_FUNCTIONAL_ASSESSMENT: ACTIVITIES ARE NOT PREVENTED
PAIN_FUNCTIONAL_ASSESSMENT: 0-10
PAIN_FUNCTIONAL_ASSESSMENT: NONE - DENIES PAIN

## 2023-06-14 NOTE — ED PROVIDER NOTES
Independent [facility] Visit. [facility]  ED  Encounter Note  Admit Date/RoomTime: 2023 11:59 AM  ED Room:   NAME: Jina Spear  : 1980  MRN: [id number]  PCP: Dick Lesches, DO    CHIEF COMPLAINT     Abdominal Pain (On menses, 2 days, pain)    HISTORY OF PRESENT ILLNESS        Jina Spear is a 40 y.o. female who presents to the ED by private vehicle for pelvic pain/cramping, beginning 2 day(s) ago. The complaint has been persistent and are moderate in severity. Patient reports that she has a history of PCOS. She states that her menstrual cycles are always heavy with cramping. She states that 2 days ago when his menstrual cycle started the pain was worse than ever. She describes diffuse lower pelvic pain. Patient reports that she been trying to manage the pain at home with ibuprofen and alcohol. She has not been sleeping in the pain. When she called her OB/GYN she was advised to come to the emergency room. She states that she has been borderline anemic in the past.  Patient denies any fever or chills. Denies nausea, vomiting or diarrhea. She does have some low back pain as well. Denies dysuria or frequency. States she was brought today to ED by her son. REVIEW OF SYSTEMS     Pertinent positives and negatives are stated within HPI, all other systems reviewed and are negative.     Past Medical History:  has a past medical history of Acute alcohol intoxication ([address] Acute bronchitis, Alcohol abuse, Anxiety and depression, Benzodiazepine abuse ([address] Breast lump in female, Corneal abrasion, Ear ache, Elevated glucose level, Elevated liver enzymes, Fatty liver, Fatty liver disease, nonalcoholic, Gastroenteritis, GERD (gastroesophageal reflux disease), Heart palpitations, History of stress test, Hypokalemia, Irregular heartbeat, Migraine, Migraines, Miscarriage, Morbid obesity with BMI of 40.0-44.9, adult ([address]

## 2023-06-19 ENCOUNTER — HOSPITAL ENCOUNTER (EMERGENCY)
Age: 43
Discharge: HOME | End: 2023-06-19
Payer: MEDICAID

## 2023-06-19 ENCOUNTER — APPOINTMENT (OUTPATIENT)
Dept: ULTRASOUND IMAGING | Age: 43
End: 2023-06-19
Payer: MEDICAID

## 2023-06-19 VITALS
TEMPERATURE: 98.1 F | DIASTOLIC BLOOD PRESSURE: 97 MMHG | SYSTOLIC BLOOD PRESSURE: 138 MMHG | RESPIRATION RATE: 16 BRPM | OXYGEN SATURATION: 98 % | HEART RATE: 90 BPM | HEIGHT: 68 IN | WEIGHT: 258 LBS | BODY MASS INDEX: 39.1 KG/M2

## 2023-06-19 DIAGNOSIS — E87.6 HYPOKALEMIA: ICD-10-CM

## 2023-06-19 DIAGNOSIS — R10.2 PELVIC PAIN: ICD-10-CM

## 2023-06-19 DIAGNOSIS — N83.201 RIGHT OVARIAN CYST: Primary | ICD-10-CM

## 2023-06-19 LAB
ANION GAP SERPL CALCULATED.3IONS-SCNC: 25 MMOL/L (ref 7–16)
BACTERIA URNS QL MICRO: NORMAL /HPF
BASOPHILS # BLD: 0.03 E9/L (ref 0–0.2)
BASOPHILS NFR BLD: 0.3 % (ref 0–2)
BILIRUB UR QL STRIP: NEGATIVE
BUN SERPL-MCNC: 12 MG/DL (ref 6–20)
CALCIUM SERPL-MCNC: 9.1 MG/DL (ref 8.6–10.2)
CHLORIDE SERPL-SCNC: 95 MMOL/L (ref 98–107)
CLARITY UR: CLEAR
CO2 SERPL-SCNC: 19 MMOL/L (ref 22–29)
COLOR UR: YELLOW
CREAT SERPL-MCNC: 0.6 MG/DL (ref 0.5–1)
EOSINOPHIL # BLD: 0.12 E9/L (ref 0.05–0.5)
EOSINOPHIL NFR BLD: 1.4 % (ref 0–6)
ERYTHROCYTE [DISTWIDTH] IN BLOOD BY AUTOMATED COUNT: 13.2 FL (ref 11.5–15)
ETHANOLAMINE SERPL-MCNC: 150 MG/DL (ref 0–0.08)
GLUCOSE SERPL-MCNC: 102 MG/DL (ref 74–99)
GLUCOSE UR STRIP-MCNC: NEGATIVE MG/DL
HCT VFR BLD AUTO: 47.9 % (ref 34–48)
HGB BLD-MCNC: 16.6 G/DL (ref 11.5–15.5)
HGB UR QL STRIP: ABNORMAL
IMM GRANULOCYTES # BLD: 0.03 E9/L
IMM GRANULOCYTES NFR BLD: 0.3 % (ref 0–5)
KETONES UR STRIP-MCNC: >=80 MG/DL
LEUKOCYTE ESTERASE UR QL STRIP: NEGATIVE
LYMPHOCYTES # BLD: 1.79 E9/L (ref 1.5–4)
LYMPHOCYTES NFR BLD: 20.3 % (ref 20–42)
MCH RBC QN AUTO: 34.8 PG (ref 26–35)
MCHC RBC AUTO-ENTMCNC: 34.7 % (ref 32–34.5)
MCV RBC AUTO: 100.4 FL (ref 80–99.9)
MONOCYTES # BLD: 0.49 E9/L (ref 0.1–0.95)
MONOCYTES NFR BLD: 5.5 % (ref 2–12)
NEUTROPHILS # BLD: 6.37 E9/L (ref 1.8–7.3)
NEUTS SEG NFR BLD: 72.2 % (ref 43–80)
NITRITE UR QL STRIP: NEGATIVE
PH UR STRIP: 6 [PH] (ref 5–9)
PLATELET # BLD AUTO: 141 E9/L (ref 130–450)
PMV BLD AUTO: 10.2 FL (ref 7–12)
POTASSIUM SERPL-SCNC: 3.1 MMOL/L (ref 3.5–5)
PROT UR STRIP-MCNC: 100 MG/DL
RBC # BLD AUTO: 4.77 E12/L (ref 3.5–5.5)
RBC #/AREA URNS HPF: NORMAL /HPF (ref 0–2)
SODIUM SERPL-SCNC: 139 MMOL/L (ref 132–146)
SP GR UR STRIP: >=1.03 (ref 1–1.03)
UROBILINOGEN UR STRIP-ACNC: 0.2 E.U./DL
WBC # BLD: 8.8 E9/L (ref 4.5–11.5)
WBC #/AREA URNS HPF: NORMAL /HPF (ref 0–5)

## 2023-06-19 PROCEDURE — 99284 EMERGENCY DEPT VISIT MOD MDM: CPT

## 2023-06-19 PROCEDURE — 80048 BASIC METABOLIC PNL TOTAL CA: CPT

## 2023-06-19 PROCEDURE — 81001 URINALYSIS AUTO W/SCOPE: CPT

## 2023-06-19 PROCEDURE — 6370000000 HC RX 637 (ALT 250 FOR IP): Performed by: PHYSICIAN ASSISTANT

## 2023-06-19 PROCEDURE — 76856 US EXAM PELVIC COMPLETE: CPT

## 2023-06-19 PROCEDURE — 82077 ASSAY SPEC XCP UR&BREATH IA: CPT

## 2023-06-19 PROCEDURE — 85025 COMPLETE CBC W/AUTO DIFF WBC: CPT

## 2023-06-19 RX ORDER — ONDANSETRON 4 MG/1
4 TABLET, FILM COATED ORAL EVERY 8 HOURS PRN
Qty: 12 TABLET | Refills: 0 | Status: SHIPPED | OUTPATIENT
Start: 2023-06-19 | End: 2023-06-24

## 2023-06-19 RX ORDER — POTASSIUM CHLORIDE 20 MEQ/1
40 TABLET, EXTENDED RELEASE ORAL ONCE
Status: COMPLETED | OUTPATIENT
Start: 2023-06-19 | End: 2023-06-19

## 2023-06-19 RX ORDER — ACETAMINOPHEN 500 MG
1000 TABLET ORAL ONCE
Status: COMPLETED | OUTPATIENT
Start: 2023-06-19 | End: 2023-06-19

## 2023-06-19 RX ADMIN — ACETAMINOPHEN 1000 MG: 500 TABLET ORAL at 20:26

## 2023-06-19 RX ADMIN — POTASSIUM CHLORIDE 40 MEQ: 1500 TABLET, EXTENDED RELEASE ORAL at 22:26

## 2023-06-19 ASSESSMENT — LIFESTYLE VARIABLES
HOW MANY STANDARD DRINKS CONTAINING ALCOHOL DO YOU HAVE ON A TYPICAL DAY: 10 OR MORE
HOW OFTEN DO YOU HAVE A DRINK CONTAINING ALCOHOL: 4 OR MORE TIMES A WEEK

## 2023-06-19 ASSESSMENT — PAIN DESCRIPTION - DESCRIPTORS: DESCRIPTORS: PRESSURE

## 2023-06-19 ASSESSMENT — PAIN DESCRIPTION - FREQUENCY: FREQUENCY: CONTINUOUS

## 2023-06-19 ASSESSMENT — PAIN DESCRIPTION - LOCATION
LOCATION: ABDOMEN
LOCATION: PELVIS

## 2023-06-19 ASSESSMENT — PAIN - FUNCTIONAL ASSESSMENT
PAIN_FUNCTIONAL_ASSESSMENT: PREVENTS OR INTERFERES SOME ACTIVE ACTIVITIES AND ADLS
PAIN_FUNCTIONAL_ASSESSMENT: 0-10

## 2023-06-19 ASSESSMENT — PAIN DESCRIPTION - ONSET: ONSET: SUDDEN

## 2023-06-19 ASSESSMENT — PAIN SCALES - GENERAL
PAINLEVEL_OUTOF10: 7
PAINLEVEL_OUTOF10: 7

## 2023-06-19 ASSESSMENT — PAIN DESCRIPTION - PAIN TYPE: TYPE: ACUTE PAIN

## 2023-06-19 NOTE — ED PROVIDER NOTES
Independent KUM[facility] Visit. [facility]  Department of Emergency Medicine   ED  Encounter Note  Admit Date/RoomTime: 2023  6:45 PM  ED Room: 32/32    NAME: Reno Joyner  : 1980  MRN: [id number]     Chief Complaint:  Pelvic Pain (Seen here a few days ago for same thing, dx with ovarian cyst. States she was prescribed norco but unable to tolerate )    History of Present Illness       Reno Joyner is a 40 y.o. old female who presents to the emergency department by private vehicle, for persistent pressure, sharp pain suprapubic area without radiation which began several day(s) prior to arrival.   There has been similar episodes in the past with actually seen and evaluated for the same complaint on 2023 diagnosed with a right-sided ovarian cyst.  Patient reports that she was given medication in the emergency department (morphine, Zofran) which she tolerated well and was discharged home with [place].  Patient reports that she cannot take any of the tablets because she cannot \"tolerate it\". She reports that she is utilizing ibuprofen at home and that it is not covering her pain. She reports that her bleeding has lessened but she still having suprapubic pain stating that she feels that she is \"going to explode from the pressure\". Patient reports he is followed Dr. Gilma Garcia on 23. The pain is associated with no additional symptoms. The pain is aggravated by pressure on area of discomfort and relieved by nothing. There has been NO chest pain, shortness of breath, fever, chills, vomiting, diarrhea, constipation, dark/black stools, blood in stool, urinary frequency, dysuria, urinary urgency, or passage of tissue. No LMP recorded. . Patient reports that she also has nausea however she was eating pizza upon initial evaluation of the patient. Patient reports that she had approximately 4 shots of vodka today prior to arrival due to the pain.   She reports she does not have

## 2023-06-20 NOTE — ED NOTES
Discharged with as followup to PCP.  Return here if worse or concerns     July Vega RN  06/19/23 4295

## 2023-08-15 ENCOUNTER — HOSPITAL ENCOUNTER (EMERGENCY)
Age: 43
Discharge: HOME | End: 2023-08-15
Attending: EMERGENCY MEDICINE
Payer: MEDICAID

## 2023-08-15 ENCOUNTER — APPOINTMENT (OUTPATIENT)
Dept: CT IMAGING | Age: 43
End: 2023-08-15
Payer: MEDICAID

## 2023-08-15 VITALS
TEMPERATURE: 98.1 F | BODY MASS INDEX: 39.4 KG/M2 | SYSTOLIC BLOOD PRESSURE: 167 MMHG | RESPIRATION RATE: 14 BRPM | HEART RATE: 87 BPM | DIASTOLIC BLOOD PRESSURE: 101 MMHG | OXYGEN SATURATION: 96 % | WEIGHT: 260 LBS | HEIGHT: 68 IN

## 2023-08-15 DIAGNOSIS — E87.6 HYPOKALEMIA: ICD-10-CM

## 2023-08-15 DIAGNOSIS — R10.32 LEFT LOWER QUADRANT ABDOMINAL PAIN: Primary | ICD-10-CM

## 2023-08-15 DIAGNOSIS — F41.1 ANXIETY STATE: ICD-10-CM

## 2023-08-15 DIAGNOSIS — F10.10 ETOH ABUSE: ICD-10-CM

## 2023-08-15 DIAGNOSIS — N94.6 DYSMENORRHEA: ICD-10-CM

## 2023-08-15 DIAGNOSIS — N20.0 RENAL CALCULUS, LEFT: ICD-10-CM

## 2023-08-15 LAB
ALBUMIN SERPL-MCNC: 4.7 G/DL (ref 3.5–5.2)
ALP SERPL-CCNC: 68 U/L (ref 35–104)
ALT SERPL-CCNC: 49 U/L (ref 0–32)
ANION GAP SERPL CALCULATED.3IONS-SCNC: 15 MMOL/L (ref 7–16)
AST SERPL-CCNC: 49 U/L (ref 0–31)
BASOPHILS # BLD: 0.04 K/UL (ref 0–0.2)
BASOPHILS NFR BLD: 1 % (ref 0–2)
BILIRUB DIRECT SERPL-MCNC: <0.2 MG/DL (ref 0–0.3)
BILIRUB INDIRECT SERPL-MCNC: ABNORMAL MG/DL (ref 0–1)
BILIRUB SERPL-MCNC: 0.4 MG/DL (ref 0–1.2)
BILIRUB UR QL STRIP: NEGATIVE
BUN SERPL-MCNC: 7 MG/DL (ref 6–20)
CALCIUM SERPL-MCNC: 8.9 MG/DL (ref 8.6–10.2)
CHLORIDE SERPL-SCNC: 105 MMOL/L (ref 98–107)
CLARITY UR: CLEAR
CO2 SERPL-SCNC: 27 MMOL/L (ref 22–29)
COLOR UR: YELLOW
CREAT SERPL-MCNC: 0.8 MG/DL (ref 0.5–1)
EOSINOPHIL # BLD: 0.08 K/UL (ref 0.05–0.5)
EOSINOPHILS RELATIVE PERCENT: 1 % (ref 0–6)
EPI CELLS #/AREA URNS HPF: ABNORMAL /HPF
ERYTHROCYTE [DISTWIDTH] IN BLOOD BY AUTOMATED COUNT: 14.7 % (ref 11.5–15)
GFR SERPL CREATININE-BSD FRML MDRD: >60 ML/MIN/1.73M2
GLUCOSE SERPL-MCNC: 93 MG/DL (ref 74–99)
GLUCOSE UR STRIP-MCNC: NEGATIVE MG/DL
HCG, URINE, POC: NEGATIVE
HCT VFR BLD AUTO: 49.2 % (ref 34–48)
HGB BLD-MCNC: 16.6 G/DL (ref 11.5–15.5)
HGB UR QL STRIP.AUTO: ABNORMAL
IMM GRANULOCYTES # BLD AUTO: <0.03 K/UL (ref 0–0.58)
IMM GRANULOCYTES NFR BLD: 0 % (ref 0–5)
KETONES UR STRIP-MCNC: ABNORMAL MG/DL
LEUKOCYTE ESTERASE UR QL STRIP: NEGATIVE
LIPASE SERPL-CCNC: 46 U/L (ref 13–60)
LYMPHOCYTES NFR BLD: 2.4 K/UL (ref 1.5–4)
LYMPHOCYTES RELATIVE PERCENT: 41 % (ref 20–42)
Lab: NORMAL
MCH RBC QN AUTO: 34.9 PG (ref 26–35)
MCHC RBC AUTO-ENTMCNC: 33.7 G/DL (ref 32–34.5)
MCV RBC AUTO: 103.4 FL (ref 80–99.9)
MONOCYTES NFR BLD: 0.52 K/UL (ref 0.1–0.95)
MONOCYTES NFR BLD: 9 % (ref 2–12)
NEGATIVE QC PASS/FAIL: NORMAL
NEUTROPHILS NFR BLD: 48 % (ref 43–80)
NEUTS SEG NFR BLD: 2.84 K/UL (ref 1.8–7.3)
NITRITE UR QL STRIP: NEGATIVE
PH UR STRIP: 6.5 [PH] (ref 5–9)
PLATELET # BLD AUTO: 167 K/UL (ref 130–450)
PMV BLD AUTO: 9.5 FL (ref 7–12)
POSITIVE QC PASS/FAIL: NORMAL
POTASSIUM SERPL-SCNC: 3.4 MMOL/L (ref 3.5–5)
PROT SERPL-MCNC: 7.5 G/DL (ref 6.4–8.3)
PROT UR STRIP-MCNC: 100 MG/DL
RBC # BLD AUTO: 4.76 M/UL (ref 3.5–5.5)
RBC #/AREA URNS HPF: ABNORMAL /HPF
SODIUM SERPL-SCNC: 147 MMOL/L (ref 132–146)
SP GR UR STRIP: >1.03 (ref 1–1.03)
UROBILINOGEN UR STRIP-ACNC: 1 EU/DL (ref 0–1)
WBC #/AREA URNS HPF: ABNORMAL /HPF
WBC OTHER # BLD: 5.9 K/UL (ref 4.5–11.5)

## 2023-08-15 PROCEDURE — 6360000002 HC RX W HCPCS: Performed by: STUDENT IN AN ORGANIZED HEALTH CARE EDUCATION/TRAINING PROGRAM

## 2023-08-15 PROCEDURE — 96374 THER/PROPH/DIAG INJ IV PUSH: CPT

## 2023-08-15 PROCEDURE — 2580000003 HC RX 258: Performed by: STUDENT IN AN ORGANIZED HEALTH CARE EDUCATION/TRAINING PROGRAM

## 2023-08-15 PROCEDURE — 81001 URINALYSIS AUTO W/SCOPE: CPT

## 2023-08-15 PROCEDURE — 82248 BILIRUBIN DIRECT: CPT

## 2023-08-15 PROCEDURE — 74177 CT ABD & PELVIS W/CONTRAST: CPT

## 2023-08-15 PROCEDURE — 6360000004 HC RX CONTRAST MEDICATION: Performed by: RADIOLOGY

## 2023-08-15 PROCEDURE — 99285 EMERGENCY DEPT VISIT HI MDM: CPT

## 2023-08-15 PROCEDURE — 2580000003 HC RX 258: Performed by: RADIOLOGY

## 2023-08-15 PROCEDURE — 6370000000 HC RX 637 (ALT 250 FOR IP): Performed by: STUDENT IN AN ORGANIZED HEALTH CARE EDUCATION/TRAINING PROGRAM

## 2023-08-15 PROCEDURE — 83690 ASSAY OF LIPASE: CPT

## 2023-08-15 PROCEDURE — 6360000002 HC RX W HCPCS: Performed by: EMERGENCY MEDICINE

## 2023-08-15 PROCEDURE — 80053 COMPREHEN METABOLIC PANEL: CPT

## 2023-08-15 PROCEDURE — 85025 COMPLETE CBC W/AUTO DIFF WBC: CPT

## 2023-08-15 RX ORDER — LORAZEPAM 1 MG/1
1 TABLET ORAL ONCE
Status: COMPLETED | OUTPATIENT
Start: 2023-08-15 | End: 2023-08-15

## 2023-08-15 RX ORDER — 0.9 % SODIUM CHLORIDE 0.9 %
1000 INTRAVENOUS SOLUTION INTRAVENOUS ONCE
Status: COMPLETED | OUTPATIENT
Start: 2023-08-15 | End: 2023-08-15

## 2023-08-15 RX ORDER — SODIUM CHLORIDE 0.9 % (FLUSH) 0.9 %
10 SYRINGE (ML) INJECTION PRN
Status: COMPLETED | OUTPATIENT
Start: 2023-08-15 | End: 2023-08-15

## 2023-08-15 RX ORDER — POTASSIUM CHLORIDE 20 MEQ/1
40 TABLET, EXTENDED RELEASE ORAL ONCE
Status: COMPLETED | OUTPATIENT
Start: 2023-08-15 | End: 2023-08-15

## 2023-08-15 RX ORDER — KETOROLAC TROMETHAMINE 30 MG/ML
15 INJECTION, SOLUTION INTRAMUSCULAR; INTRAVENOUS ONCE
Status: COMPLETED | OUTPATIENT
Start: 2023-08-15 | End: 2023-08-15

## 2023-08-15 RX ORDER — FENTANYL CITRATE 50 UG/ML
50 INJECTION, SOLUTION INTRAMUSCULAR; INTRAVENOUS ONCE
Status: COMPLETED | OUTPATIENT
Start: 2023-08-15 | End: 2023-08-15

## 2023-08-15 RX ADMIN — SODIUM CHLORIDE 1000 ML: 9 INJECTION, SOLUTION INTRAVENOUS at 03:49

## 2023-08-15 RX ADMIN — KETOROLAC TROMETHAMINE 15 MG: 30 INJECTION, SOLUTION INTRAMUSCULAR; INTRAVENOUS at 03:51

## 2023-08-15 RX ADMIN — SODIUM CHLORIDE, PRESERVATIVE FREE 10 ML: 5 INJECTION INTRAVENOUS at 06:07

## 2023-08-15 RX ADMIN — LORAZEPAM 1 MG: 1 TABLET ORAL at 03:51

## 2023-08-15 RX ADMIN — POTASSIUM CHLORIDE 40 MEQ: 1500 TABLET, EXTENDED RELEASE ORAL at 06:37

## 2023-08-15 RX ADMIN — IOPAMIDOL 75 ML: 755 INJECTION, SOLUTION INTRAVENOUS at 06:10

## 2023-08-15 RX ADMIN — FENTANYL CITRATE 50 MCG: 50 INJECTION, SOLUTION INTRAMUSCULAR; INTRAVENOUS at 04:51

## 2023-08-15 ASSESSMENT — PAIN DESCRIPTION - LOCATION: LOCATION: ABDOMEN

## 2023-08-15 ASSESSMENT — PAIN DESCRIPTION - ORIENTATION: ORIENTATION: LEFT

## 2023-08-15 ASSESSMENT — PAIN - FUNCTIONAL ASSESSMENT: PAIN_FUNCTIONAL_ASSESSMENT: 0-10

## 2023-08-15 ASSESSMENT — PAIN SCALES - GENERAL: PAINLEVEL_OUTOF10: 4

## 2023-08-15 NOTE — ED NOTES
Pt ambulated to the restroom and took a shower, she alerted the nursing staff after the fact. She states she is still feeling anxious but was given ativan to aid the anxiety. Informed her that she will be going to CT scan.       Zarina Berg, [address] [place] [address]  08/15/23 8339

## 2023-08-15 NOTE — ED PROVIDER NOTES
Patient signed out by Dr Susan Hussein    Briefly, 44 year female presenting with left lower abdominal pain. She is on her menses right now, and had increased alcohol consumption. White blood cell count 5.9, hypokalemia with potassium of 3.4. Oral replacement. Awaiting CT scan reports. CT scan is suggestive of a left nephrolithiasis. No hydronephrosis. Reevaluation, abdomen is soft, tenderness. The patient reports that the pain has decreased. She also mentioned that she is taking ibuprofen for pain. I spoke to the patient about the risks of getting an gastrointestina ulcer or damaging the kidney with increased amount of ibuprofen intake. I managed her for getting reevaluated for increased pain, fever, nausea or vomiting. Plan for discharge.                  Jef Matamoros MD  Resident  08/15/23 Prashanth Westfall MD  Resident  08/15/23 1525
MD at [address] [place] [address]       Discharge Medication List as of 8/15/2023  8:20 AM        CONTINUE these medications which have NOT CHANGED    Details   ondansetron (ZOFRAN-ODT) 4 MG disintegrating tablet ondansetron 4 mg disintegrating tablet   Place 1 tablet every 6-8 hours by translingual route as needed. Historical Med      fluticasone (FLONASE) 50 MCG/ACT nasal spray fluticasone propionate 50 mcg/actuation nasal spray,suspension   SHAKE LIQUID AND USE 2 SPRAYS IN EACH NOSTRIL EVERY DAYHistorical Med      clobetasol (TEMOVATE) 0.05 % ointment Apply bid for 2 weeks then qd, Disp-45 g, R-1, Normal      ibuprofen (ADVIL;MOTRIN) 600 MG tablet Take 1 tablet by mouth 3 times daily as needed for Pain or Fever, Disp-21 tablet, R-0Print      acetaminophen (TYLENOL) 500 MG tablet Take 2 tablets by mouth 3 times daily for 7 days, Disp-42 tablet, R-0Print      ALPRAZolam (XANAX) 1 MG tablet Take 1 tablet by mouth as needed for Anxiety. Historical Med      metoprolol tartrate 75 MG TABS TAKE 1 TABLET BY MOUTH TWICE DAILY, Disp-270 tablet, R-3Normal      NONFORMULARY Take by mouth every 30 days Oil of Oregano Historical Med      eletriptan (RELPAX) 40 MG tablet Take 1 tablet by mouth once as needed (migraine) may repeat in 2 hours if necessary. , Disp-6 tablet, R-1Normal             ALLERGIES     Reglan [metoclopramide]    FAMILYHISTORY       Family History   Problem Relation Age of Onset    Heart Attack Father 54    Cancer Father 61        Lung    Heart Attack Paternal Grandfather 48    Cancer Maternal Aunt 36        Bone    Breast Cancer Maternal Aunt 25    COPD Maternal Grandfather         SOCIAL HISTORY       Social History     Tobacco Use    Smoking status: Every Day     Packs/day: 1.00     Types: Cigarettes    Smokeless tobacco: Never    Tobacco comments:     started smoking age 14 and smoked up to 2 ppd since   Vaping Use    Vaping Use: Never used   Substance Use Topics    Alcohol use:  Yes

## 2023-08-28 ENCOUNTER — HOSPITAL ENCOUNTER (EMERGENCY)
Age: 43
Discharge: LEFT BEFORE DISCHARGE | End: 2023-08-29
Attending: EMERGENCY MEDICINE
Payer: MEDICAID

## 2023-08-28 ENCOUNTER — APPOINTMENT (OUTPATIENT)
Dept: CT IMAGING | Age: 43
End: 2023-08-28
Payer: MEDICAID

## 2023-08-28 DIAGNOSIS — R74.01 TRANSAMINITIS: ICD-10-CM

## 2023-08-28 DIAGNOSIS — F10.921 ACUTE ALCOHOLIC INTOXICATION WITH DELIRIUM (HCC): Primary | ICD-10-CM

## 2023-08-28 LAB
ALBUMIN SERPL-MCNC: 4.5 G/DL (ref 3.5–5.2)
ALP SERPL-CCNC: 81 U/L (ref 35–104)
ALT SERPL-CCNC: 67 U/L (ref 0–32)
AMPHET UR QL SCN: NEGATIVE
ANION GAP SERPL CALCULATED.3IONS-SCNC: 17 MMOL/L (ref 7–16)
AST SERPL-CCNC: 123 U/L (ref 0–31)
BARBITURATES UR QL SCN: NEGATIVE
BASOPHILS # BLD: 0.02 K/UL (ref 0–0.2)
BASOPHILS NFR BLD: 0 % (ref 0–2)
BENZODIAZ UR QL: NEGATIVE
BILIRUB SERPL-MCNC: 0.5 MG/DL (ref 0–1.2)
BUN SERPL-MCNC: 9 MG/DL (ref 6–20)
BUPRENORPHINE UR QL: NEGATIVE
CALCIUM SERPL-MCNC: 9.8 MG/DL (ref 8.6–10.2)
CANNABINOIDS UR QL SCN: NEGATIVE
CHLORIDE SERPL-SCNC: 101 MMOL/L (ref 98–107)
CO2 SERPL-SCNC: 26 MMOL/L (ref 22–29)
COCAINE UR QL SCN: NEGATIVE
CREAT SERPL-MCNC: 0.6 MG/DL (ref 0.5–1)
EOSINOPHIL # BLD: 0.12 K/UL (ref 0.05–0.5)
EOSINOPHILS RELATIVE PERCENT: 1 % (ref 0–6)
ERYTHROCYTE [DISTWIDTH] IN BLOOD BY AUTOMATED COUNT: 14.8 % (ref 11.5–15)
FENTANYL UR QL: NEGATIVE
GFR SERPL CREATININE-BSD FRML MDRD: >60 ML/MIN/1.73M2
GLUCOSE SERPL-MCNC: 114 MG/DL (ref 74–99)
HCG SERPL QL: NEGATIVE
HCT VFR BLD AUTO: 45.7 % (ref 34–48)
HGB BLD-MCNC: 16.2 G/DL (ref 11.5–15.5)
IMM GRANULOCYTES # BLD AUTO: 0.04 K/UL (ref 0–0.58)
IMM GRANULOCYTES NFR BLD: 1 % (ref 0–5)
LYMPHOCYTES NFR BLD: 3.28 K/UL (ref 1.5–4)
LYMPHOCYTES RELATIVE PERCENT: 39 % (ref 20–42)
MAGNESIUM SERPL-MCNC: 2 MG/DL (ref 1.6–2.6)
MCH RBC QN AUTO: 35.9 PG (ref 26–35)
MCHC RBC AUTO-ENTMCNC: 35.4 G/DL (ref 32–34.5)
MCV RBC AUTO: 101.3 FL (ref 80–99.9)
METHADONE UR QL: NEGATIVE
MONOCYTES NFR BLD: 0.87 K/UL (ref 0.1–0.95)
MONOCYTES NFR BLD: 10 % (ref 2–12)
NEUTROPHILS NFR BLD: 49 % (ref 43–80)
NEUTS SEG NFR BLD: 4.19 K/UL (ref 1.8–7.3)
OPIATES UR QL SCN: NEGATIVE
OXYCODONE UR QL SCN: NEGATIVE
PCP UR QL SCN: NEGATIVE
PLATELET # BLD AUTO: 173 K/UL (ref 130–450)
PMV BLD AUTO: 9.7 FL (ref 7–12)
POTASSIUM SERPL-SCNC: 3.3 MMOL/L (ref 3.5–5)
PROT SERPL-MCNC: 7.3 G/DL (ref 6.4–8.3)
RBC # BLD AUTO: 4.51 M/UL (ref 3.5–5.5)
SODIUM SERPL-SCNC: 144 MMOL/L (ref 132–146)
TEST INFORMATION: NORMAL
WBC OTHER # BLD: 8.5 K/UL (ref 4.5–11.5)

## 2023-08-28 PROCEDURE — G0480 DRUG TEST DEF 1-7 CLASSES: HCPCS

## 2023-08-28 PROCEDURE — 96361 HYDRATE IV INFUSION ADD-ON: CPT

## 2023-08-28 PROCEDURE — 84703 CHORIONIC GONADOTROPIN ASSAY: CPT

## 2023-08-28 PROCEDURE — 96374 THER/PROPH/DIAG INJ IV PUSH: CPT

## 2023-08-28 PROCEDURE — 85025 COMPLETE CBC W/AUTO DIFF WBC: CPT

## 2023-08-28 PROCEDURE — 80053 COMPREHEN METABOLIC PANEL: CPT

## 2023-08-28 PROCEDURE — 80143 DRUG ASSAY ACETAMINOPHEN: CPT

## 2023-08-28 PROCEDURE — 99284 EMERGENCY DEPT VISIT MOD MDM: CPT

## 2023-08-28 PROCEDURE — 83735 ASSAY OF MAGNESIUM: CPT

## 2023-08-28 PROCEDURE — 6360000002 HC RX W HCPCS: Performed by: STUDENT IN AN ORGANIZED HEALTH CARE EDUCATION/TRAINING PROGRAM

## 2023-08-28 PROCEDURE — 2580000003 HC RX 258: Performed by: STUDENT IN AN ORGANIZED HEALTH CARE EDUCATION/TRAINING PROGRAM

## 2023-08-28 PROCEDURE — 80179 DRUG ASSAY SALICYLATE: CPT

## 2023-08-28 PROCEDURE — 70450 CT HEAD/BRAIN W/O DYE: CPT

## 2023-08-28 PROCEDURE — 80307 DRUG TEST PRSMV CHEM ANLYZR: CPT

## 2023-08-28 RX ORDER — THIAMINE HYDROCHLORIDE 100 MG/ML
100 INJECTION, SOLUTION INTRAMUSCULAR; INTRAVENOUS ONCE
Status: COMPLETED | OUTPATIENT
Start: 2023-08-28 | End: 2023-08-28

## 2023-08-28 RX ORDER — 0.9 % SODIUM CHLORIDE 0.9 %
1000 INTRAVENOUS SOLUTION INTRAVENOUS ONCE
Status: COMPLETED | OUTPATIENT
Start: 2023-08-28 | End: 2023-08-29

## 2023-08-28 RX ADMIN — SODIUM CHLORIDE 1000 ML: 9 INJECTION, SOLUTION INTRAVENOUS at 22:57

## 2023-08-28 RX ADMIN — THIAMINE HYDROCHLORIDE 100 MG: 100 INJECTION, SOLUTION INTRAMUSCULAR; INTRAVENOUS at 23:02

## 2023-08-28 ASSESSMENT — PAIN - FUNCTIONAL ASSESSMENT: PAIN_FUNCTIONAL_ASSESSMENT: NONE - DENIES PAIN

## 2023-08-29 VITALS
RESPIRATION RATE: 18 BRPM | TEMPERATURE: 98 F | DIASTOLIC BLOOD PRESSURE: 90 MMHG | HEART RATE: 83 BPM | SYSTOLIC BLOOD PRESSURE: 132 MMHG | OXYGEN SATURATION: 98 % | BODY MASS INDEX: 43.95 KG/M2 | WEIGHT: 280 LBS | HEIGHT: 67 IN

## 2023-08-29 LAB
APAP SERPL-MCNC: <5 UG/ML (ref 10–30)
EKG ATRIAL RATE: 81 BPM
EKG P AXIS: 40 DEGREES
EKG P-R INTERVAL: 190 MS
EKG Q-T INTERVAL: 422 MS
EKG QRS DURATION: 78 MS
EKG QTC CALCULATION (BAZETT): 490 MS
EKG R AXIS: 26 DEGREES
EKG T AXIS: 38 DEGREES
EKG VENTRICULAR RATE: 81 BPM
ETHANOLAMINE SERPL-MCNC: 292 MG/DL
ETHANOLAMINE SERPL-MCNC: 479 MG/DL
SALICYLATES SERPL-MCNC: <0.3 MG/DL (ref 0–30)
TOXIC TRICYCLIC SC,BLOOD: NEGATIVE

## 2023-08-29 PROCEDURE — 6370000000 HC RX 637 (ALT 250 FOR IP): Performed by: STUDENT IN AN ORGANIZED HEALTH CARE EDUCATION/TRAINING PROGRAM

## 2023-08-29 PROCEDURE — 93005 ELECTROCARDIOGRAM TRACING: CPT | Performed by: STUDENT IN AN ORGANIZED HEALTH CARE EDUCATION/TRAINING PROGRAM

## 2023-08-29 PROCEDURE — 93010 ELECTROCARDIOGRAM REPORT: CPT | Performed by: INTERNAL MEDICINE

## 2023-08-29 PROCEDURE — G0480 DRUG TEST DEF 1-7 CLASSES: HCPCS

## 2023-08-29 RX ORDER — SODIUM CHLORIDE 0.9 % (FLUSH) 0.9 %
SYRINGE (ML) INJECTION
Status: DISCONTINUED
Start: 2023-08-29 | End: 2023-08-29 | Stop reason: HOSPADM

## 2023-08-29 RX ADMIN — METOPROLOL TARTRATE 75 MG: 50 TABLET, FILM COATED ORAL at 09:00

## 2023-08-29 ASSESSMENT — PAIN - FUNCTIONAL ASSESSMENT: PAIN_FUNCTIONAL_ASSESSMENT: NONE - DENIES PAIN

## 2023-08-29 NOTE — CARE COORDINATION
Peer Recovery Support Note    Name: Rikki Villegas  Date: 8/29/2023    Chief Complaint   Patient presents with    Alcohol Intoxication    Depression     States she wants help, denies SI/HI       Peer Support met with patient. [x] Support and education provided  [] Resources provided   [] Treatment referral:   [] Other:   [] Patient declined peer recovery services     Referred By: Destiny Mora. Notes: Peer met with patient to discuss different treatments centers for detox cause she was asking for help. Patient was short with peer and stated she is not going inpatient anywhere. Patient want a librium program that was done for her in the past by another ER doctor here at [facility]. I left resources for patient Also let SW know of what she was requesting.     SignedThtanner Senate, 8/29/2023

## 2023-08-29 NOTE — CARE COORDINATION
Social Work/Transition of Care:     Pt presents due to Alcohol Intoxication, requesting information on treatment facilities SW left message for Peer Support Ludy Durant to notify of pt in the ED and requesting information. Electronically signed by DARRION Ruiz on 4/15/4940 at 9:45 AM     Pt declined Substance Abuse inpatient Treatment SW followed up with pt who stated she has a appointment with [address] [place] [address] in order to receive the Vivitrol injection. Pt reports she \"loves to drink\" and the only way to stop is by taking the injections. SW again offered to assist with referring pt to a detox facility pt declined. Pt requested the medication taper that was given to her prior in the Emergency Room in order to safely detox, ED PCP notified.       Electronically signed by Shade Lawrence on 7/39/1954 at 11:07 AM
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
## 2023-08-29 NOTE — ED NOTES
Assumed care of patient. Vitals stable. Pt resting in bed talking on the phone. Pt given ice water and updated on waiting for lab result.      Aliza Arroyo RN  08/29/23 4545

## 2023-09-08 ENCOUNTER — HOSPITAL ENCOUNTER (OUTPATIENT)
Age: 43
Setting detail: OBSERVATION
Discharge: HOME | End: 2023-09-09
Attending: EMERGENCY MEDICINE | Admitting: SURGERY
Payer: OTHER MISCELLANEOUS

## 2023-09-08 ENCOUNTER — APPOINTMENT (OUTPATIENT)
Dept: GENERAL RADIOLOGY | Age: 43
End: 2023-09-08
Payer: OTHER MISCELLANEOUS

## 2023-09-08 ENCOUNTER — APPOINTMENT (OUTPATIENT)
Dept: CT IMAGING | Age: 43
End: 2023-09-08
Payer: OTHER MISCELLANEOUS

## 2023-09-08 DIAGNOSIS — T14.8XXA ABRASION: ICD-10-CM
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
## 2023-09-08 DIAGNOSIS — F10.920 ACUTE ALCOHOLIC INTOXICATION WITHOUT COMPLICATION (HCC): ICD-10-CM

## 2023-09-08 PROBLEM — R78.0 BLOOD ALCOHOL LEVEL GREATER THAN 0.3: Status: ACTIVE | Noted: 2023-09-08

## 2023-09-08 PROBLEM — E87.20 LACTIC ACIDOSIS: Status: ACTIVE | Noted: 2023-09-08

## 2023-09-08 LAB
AADO2: 328.9 MMHG
AADO2: 328.9 MMHG
ABO + RH BLD: NORMAL
ABO + RH BLD: NORMAL
ALBUMIN SERPL-MCNC: 4.5 G/DL (ref 3.5–5.2)
ALBUMIN SERPL-MCNC: 4.5 G/DL (ref 3.5–5.2)
ALP SERPL-CCNC: 53 U/L (ref 35–104)
ALP SERPL-CCNC: 53 U/L (ref 35–104)
ALT SERPL-CCNC: 101 U/L (ref 0–32)
ALT SERPL-CCNC: 101 U/L (ref 0–32)
AMPHET UR QL SCN: NEGATIVE
AMPHET UR QL SCN: NEGATIVE
ANION GAP SERPL CALCULATED.3IONS-SCNC: 21 MMOL/L (ref 7–16)
ANION GAP SERPL CALCULATED.3IONS-SCNC: 21 MMOL/L (ref 7–16)
APAP SERPL-MCNC: <5 UG/ML (ref 10–30)
APAP SERPL-MCNC: <5 UG/ML (ref 10–30)
ARM BAND NUMBER: NORMAL
ARM BAND NUMBER: NORMAL
AST SERPL-CCNC: 100 U/L (ref 0–31)
AST SERPL-CCNC: 100 U/L (ref 0–31)
B.E.: -5.5 MMOL/L (ref -3–3)
B.E.: -5.5 MMOL/L (ref -3–3)
BARBITURATES UR QL SCN: NEGATIVE
BARBITURATES UR QL SCN: NEGATIVE
BENZODIAZ UR QL: POSITIVE
BENZODIAZ UR QL: POSITIVE
BILIRUB SERPL-MCNC: 0.5 MG/DL (ref 0–1.2)
BILIRUB SERPL-MCNC: 0.5 MG/DL (ref 0–1.2)
BLOOD BANK SAMPLE EXPIRATION: NORMAL
BLOOD BANK SAMPLE EXPIRATION: NORMAL
BLOOD GROUP ANTIBODIES SERPL: NEGATIVE
BLOOD GROUP ANTIBODIES SERPL: NEGATIVE
BUN SERPL-MCNC: 9 MG/DL (ref 6–20)
BUN SERPL-MCNC: 9 MG/DL (ref 6–20)
BUPRENORPHINE UR QL: NEGATIVE
BUPRENORPHINE UR QL: NEGATIVE
CALCIUM SERPL-MCNC: 8.6 MG/DL (ref 8.6–10.2)
CALCIUM SERPL-MCNC: 8.6 MG/DL (ref 8.6–10.2)
CANNABINOIDS UR QL SCN: NEGATIVE
CANNABINOIDS UR QL SCN: NEGATIVE
CHLORIDE SERPL-SCNC: 105 MMOL/L (ref 98–107)
CHLORIDE SERPL-SCNC: 105 MMOL/L (ref 98–107)
CO2 SERPL-SCNC: 19 MMOL/L (ref 22–29)
CO2 SERPL-SCNC: 19 MMOL/L (ref 22–29)
COCAINE UR QL SCN: NEGATIVE
COCAINE UR QL SCN: NEGATIVE
COHB: 6.1 % (ref 0–1.5)
COHB: 6.1 % (ref 0–1.5)
CREAT SERPL-MCNC: 0.7 MG/DL (ref 0.5–1)
CREAT SERPL-MCNC: 0.7 MG/DL (ref 0.5–1)
CRITICAL: ABNORMAL
CRITICAL: ABNORMAL
DATE ANALYZED: ABNORMAL
DATE ANALYZED: ABNORMAL
DATE OF COLLECTION: ABNORMAL
DATE OF COLLECTION: ABNORMAL
ERYTHROCYTE [DISTWIDTH] IN BLOOD BY AUTOMATED COUNT: 14.7 % (ref 11.5–15)
ERYTHROCYTE [DISTWIDTH] IN BLOOD BY AUTOMATED COUNT: 14.7 % (ref 11.5–15)
ETHANOLAMINE SERPL-MCNC: 412 MG/DL
ETHANOLAMINE SERPL-MCNC: 412 MG/DL
FENTANYL UR QL: NEGATIVE
FENTANYL UR QL: NEGATIVE
FIO2: 100 %
FIO2: 100 %
GFR SERPL CREATININE-BSD FRML MDRD: >60 ML/MIN/1.73M2
GFR SERPL CREATININE-BSD FRML MDRD: >60 ML/MIN/1.73M2
GLUCOSE SERPL-MCNC: 70 MG/DL (ref 74–99)
GLUCOSE SERPL-MCNC: 70 MG/DL (ref 74–99)
HCG SERPL QL: NEGATIVE
HCG SERPL QL: NEGATIVE
HCO3: 17.7 MMOL/L (ref 22–26)
HCO3: 17.7 MMOL/L (ref 22–26)
HCT VFR BLD AUTO: 44.9 % (ref 34–48)
HCT VFR BLD AUTO: 44.9 % (ref 34–48)
HGB BLD-MCNC: 15.4 G/DL (ref 11.5–15.5)
HGB BLD-MCNC: 15.4 G/DL (ref 11.5–15.5)
HHB: 0.5 % (ref 0–5)
HHB: 0.5 % (ref 0–5)
INR PPP: 1
INR PPP: 1
LAB: ABNORMAL
LAB: ABNORMAL
LACTATE BLDV-SCNC: 5.7 MMOL/L (ref 0.5–2.2)
LACTATE BLDV-SCNC: 5.7 MMOL/L (ref 0.5–2.2)
LACTATE BLDV-SCNC: 6.5 MMOL/L (ref 0.5–2.2)
LACTATE BLDV-SCNC: 6.5 MMOL/L (ref 0.5–2.2)
Lab: ABNORMAL
Lab: ABNORMAL
MCH RBC QN AUTO: 35.6 PG (ref 26–35)
MCH RBC QN AUTO: 35.6 PG (ref 26–35)
MCHC RBC AUTO-ENTMCNC: 34.3 G/DL (ref 32–34.5)
MCHC RBC AUTO-ENTMCNC: 34.3 G/DL (ref 32–34.5)
MCV RBC AUTO: 103.9 FL (ref 80–99.9)
MCV RBC AUTO: 103.9 FL (ref 80–99.9)
METHADONE UR QL: NEGATIVE
METHADONE UR QL: NEGATIVE
METHB: 0.6 % (ref 0–1.5)
METHB: 0.6 % (ref 0–1.5)
MODE: ABNORMAL
MODE: ABNORMAL
O2 CONTENT: 21.7 ML/DL
O2 CONTENT: 21.7 ML/DL
O2 SATURATION: 99.5 % (ref 92–98.5)
O2 SATURATION: 99.5 % (ref 92–98.5)
O2HB: 92.8 % (ref 94–97)
O2HB: 92.8 % (ref 94–97)
OPERATOR ID: 5323
OPERATOR ID: 5323
OPIATES UR QL SCN: NEGATIVE
OPIATES UR QL SCN: NEGATIVE
OXYCODONE UR QL SCN: NEGATIVE
OXYCODONE UR QL SCN: NEGATIVE
PARTIAL THROMBOPLASTIN TIME: 32 SEC (ref 24.5–35.1)
PARTIAL THROMBOPLASTIN TIME: 32 SEC (ref 24.5–35.1)
PATIENT TEMP: 37 C
PATIENT TEMP: 37 C
PCO2: 29.5 MMHG (ref 35–45)
PCO2: 29.5 MMHG (ref 35–45)
PCP UR QL SCN: NEGATIVE
PCP UR QL SCN: NEGATIVE
PFO2: 3.3 MMHG/%
PFO2: 3.3 MMHG/%
PH BLOOD GAS: 7.4 (ref 7.35–7.45)
PH BLOOD GAS: 7.4 (ref 7.35–7.45)
PLATELET # BLD AUTO: 236 K/UL (ref 130–450)
PLATELET # BLD AUTO: 236 K/UL (ref 130–450)
PMV BLD AUTO: 10.2 FL (ref 7–12)
PMV BLD AUTO: 10.2 FL (ref 7–12)
PO2: 329.6 MMHG (ref 75–100)
PO2: 329.6 MMHG (ref 75–100)
POTASSIUM SERPL-SCNC: 3.2 MMOL/L (ref 3.5–5)
POTASSIUM SERPL-SCNC: 3.2 MMOL/L (ref 3.5–5)
POTASSIUM SERPL-SCNC: 3.39 MMOL/L (ref 3.5–5)
POTASSIUM SERPL-SCNC: 3.39 MMOL/L (ref 3.5–5)
PROT SERPL-MCNC: 7.4 G/DL (ref 6.4–8.3)
PROT SERPL-MCNC: 7.4 G/DL (ref 6.4–8.3)
PROTHROMBIN TIME: 10.9 SEC (ref 9.3–12.4)
PROTHROMBIN TIME: 10.9 SEC (ref 9.3–12.4)
RBC # BLD AUTO: 4.32 M/UL (ref 3.5–5.5)
RBC # BLD AUTO: 4.32 M/UL (ref 3.5–5.5)
RI(T): 1
RI(T): 1
SALICYLATES SERPL-MCNC: <0.3 MG/DL (ref 0–30)
SALICYLATES SERPL-MCNC: <0.3 MG/DL (ref 0–30)
SODIUM SERPL-SCNC: 145 MMOL/L (ref 132–146)
SODIUM SERPL-SCNC: 145 MMOL/L (ref 132–146)
SOURCE, BLOOD GAS: ABNORMAL
SOURCE, BLOOD GAS: ABNORMAL
TEST INFORMATION: ABNORMAL
TEST INFORMATION: ABNORMAL
THB: 16 G/DL (ref 11.5–16.5)
THB: 16 G/DL (ref 11.5–16.5)
TIME ANALYZED: 1645
TIME ANALYZED: 1645
TOXIC TRICYCLIC SC,BLOOD: NEGATIVE
TOXIC TRICYCLIC SC,BLOOD: NEGATIVE
WBC OTHER # BLD: 9.1 K/UL (ref 4.5–11.5)
WBC OTHER # BLD: 9.1 K/UL (ref 4.5–11.5)

## 2023-09-08 PROCEDURE — 80307 DRUG TEST PRSMV CHEM ANLYZR: CPT

## 2023-09-08 PROCEDURE — 2580000003 HC RX 258

## 2023-09-08 PROCEDURE — 86850 RBC ANTIBODY SCREEN: CPT

## 2023-09-08 PROCEDURE — 86901 BLOOD TYPING SEROLOGIC RH(D): CPT

## 2023-09-08 PROCEDURE — 85610 PROTHROMBIN TIME: CPT

## 2023-09-08 PROCEDURE — 6370000000 HC RX 637 (ALT 250 FOR IP)

## 2023-09-08 PROCEDURE — G0378 HOSPITAL OBSERVATION PER HR: HCPCS

## 2023-09-08 PROCEDURE — 72125 CT NECK SPINE W/O DYE: CPT

## 2023-09-08 PROCEDURE — 84703 CHORIONIC GONADOTROPIN ASSAY: CPT

## 2023-09-08 PROCEDURE — G0480 DRUG TEST DEF 1-7 CLASSES: HCPCS

## 2023-09-08 PROCEDURE — 80179 DRUG ASSAY SALICYLATE: CPT

## 2023-09-08 PROCEDURE — 84132 ASSAY OF SERUM POTASSIUM: CPT

## 2023-09-08 PROCEDURE — 36415 COLL VENOUS BLD VENIPUNCTURE: CPT

## 2023-09-08 PROCEDURE — 72170 X-RAY EXAM OF PELVIS: CPT

## 2023-09-08 PROCEDURE — 80143 DRUG ASSAY ACETAMINOPHEN: CPT

## 2023-09-08 PROCEDURE — 85730 THROMBOPLASTIN TIME PARTIAL: CPT

## 2023-09-08 PROCEDURE — 83605 ASSAY OF LACTIC ACID: CPT

## 2023-09-08 PROCEDURE — 71260 CT THORAX DX C+: CPT

## 2023-09-08 PROCEDURE — 85027 COMPLETE CBC AUTOMATED: CPT

## 2023-09-08 PROCEDURE — 86900 BLOOD TYPING SEROLOGIC ABO: CPT

## 2023-09-08 PROCEDURE — 82805 BLOOD GASES W/O2 SATURATION: CPT

## 2023-09-08 PROCEDURE — 73080 X-RAY EXAM OF ELBOW: CPT

## 2023-09-08 PROCEDURE — 71045 X-RAY EXAM CHEST 1 VIEW: CPT

## 2023-09-08 PROCEDURE — 99285 EMERGENCY DEPT VISIT HI MDM: CPT

## 2023-09-08 PROCEDURE — 74177 CT ABD & PELVIS W/CONTRAST: CPT

## 2023-09-08 PROCEDURE — 70450 CT HEAD/BRAIN W/O DYE: CPT

## 2023-09-08 PROCEDURE — 80053 COMPREHEN METABOLIC PANEL: CPT

## 2023-09-08 PROCEDURE — 6810039000 HC L1 TRAUMA ALERT

## 2023-09-08 PROCEDURE — 6360000004 HC RX CONTRAST MEDICATION: Performed by: RADIOLOGY

## 2023-09-08 RX ORDER — IBUPROFEN 800 MG/1
800 TABLET ORAL 2 TIMES DAILY PRN
COMMUNITY

## 2023-09-08 RX ORDER — SODIUM CHLORIDE 0.9 % (FLUSH) 0.9 %
10 SYRINGE (ML) INJECTION PRN
Status: DISCONTINUED | OUTPATIENT
Start: 2023-09-08 | End: 2023-09-09 | Stop reason: HOSPADM

## 2023-09-08 RX ORDER — METOPROLOL TARTRATE 50 MG/1
100 TABLET, FILM COATED ORAL 2 TIMES DAILY
Status: DISCONTINUED | OUTPATIENT
Start: 2023-09-08 | End: 2023-09-09 | Stop reason: HOSPADM

## 2023-09-08 RX ORDER — METHOCARBAMOL 500 MG/1
500 TABLET, FILM COATED ORAL 4 TIMES DAILY
Status: DISCONTINUED | OUTPATIENT
Start: 2023-09-08 | End: 2023-09-09 | Stop reason: HOSPADM

## 2023-09-08 RX ORDER — IBUPROFEN 800 MG/1
800 TABLET ORAL EVERY 4 HOURS PRN
Status: DISCONTINUED | OUTPATIENT
Start: 2023-09-08 | End: 2023-09-09 | Stop reason: HOSPADM

## 2023-09-08 RX ORDER — POLYETHYLENE GLYCOL 3350 17 G/17G
17 POWDER, FOR SOLUTION ORAL DAILY
Status: DISCONTINUED | OUTPATIENT
Start: 2023-09-08 | End: 2023-09-09 | Stop reason: HOSPADM

## 2023-09-08 RX ORDER — SODIUM CHLORIDE 9 MG/ML
INJECTION, SOLUTION INTRAVENOUS CONTINUOUS
Status: DISCONTINUED | OUTPATIENT
Start: 2023-09-08 | End: 2023-09-09 | Stop reason: HOSPADM

## 2023-09-08 RX ORDER — ALPRAZOLAM 1 MG/1
1 TABLET ORAL NIGHTLY PRN
COMMUNITY

## 2023-09-08 RX ORDER — ALPRAZOLAM 1 MG/1
1 TABLET ORAL NIGHTLY PRN
Status: DISCONTINUED | OUTPATIENT
Start: 2023-09-08 | End: 2023-09-09 | Stop reason: HOSPADM

## 2023-09-08 RX ORDER — ACETAMINOPHEN 325 MG/1
650 TABLET ORAL EVERY 4 HOURS PRN
Status: DISCONTINUED | OUTPATIENT
Start: 2023-09-08 | End: 2023-09-08

## 2023-09-08 RX ORDER — ACETAMINOPHEN 325 MG/1
650 TABLET ORAL EVERY 4 HOURS PRN
Status: DISCONTINUED | OUTPATIENT
Start: 2023-09-08 | End: 2023-09-09 | Stop reason: HOSPADM

## 2023-09-08 RX ORDER — METOPROLOL TARTRATE 100 MG/1
100 TABLET ORAL 2 TIMES DAILY
COMMUNITY

## 2023-09-08 RX ORDER — SODIUM CHLORIDE 9 MG/ML
INJECTION, SOLUTION INTRAVENOUS ONCE
Status: DISCONTINUED | OUTPATIENT
Start: 2023-09-08 | End: 2023-09-09 | Stop reason: HOSPADM

## 2023-09-08 RX ORDER — ONDANSETRON 4 MG/1
4 TABLET, ORALLY DISINTEGRATING ORAL EVERY 8 HOURS PRN
Status: DISCONTINUED | OUTPATIENT
Start: 2023-09-08 | End: 2023-09-09 | Stop reason: HOSPADM

## 2023-09-08 RX ORDER — ONDANSETRON 2 MG/ML
4 INJECTION INTRAMUSCULAR; INTRAVENOUS EVERY 6 HOURS PRN
Status: DISCONTINUED | OUTPATIENT
Start: 2023-09-08 | End: 2023-09-09 | Stop reason: HOSPADM

## 2023-09-08 RX ORDER — SODIUM CHLORIDE 0.9 % (FLUSH) 0.9 %
10 SYRINGE (ML) INJECTION EVERY 12 HOURS SCHEDULED
Status: DISCONTINUED | OUTPATIENT
Start: 2023-09-08 | End: 2023-09-09 | Stop reason: HOSPADM

## 2023-09-08 RX ORDER — SODIUM CHLORIDE 9 MG/ML
INJECTION, SOLUTION INTRAVENOUS PRN
Status: DISCONTINUED | OUTPATIENT
Start: 2023-09-08 | End: 2023-09-09 | Stop reason: HOSPADM

## 2023-09-08 RX ADMIN — METHOCARBAMOL 500 MG: 500 TABLET ORAL at 17:17

## 2023-09-08 RX ADMIN — IOPAMIDOL 90 ML: 755 INJECTION, SOLUTION INTRAVENOUS at 17:04

## 2023-09-08 RX ADMIN — SODIUM CHLORIDE: 9 INJECTION, SOLUTION INTRAVENOUS at 17:17

## 2023-09-08 RX ADMIN — SODIUM CHLORIDE: 9 INJECTION, SOLUTION INTRAVENOUS at 22:04

## 2023-09-08 RX ADMIN — Medication 10 ML: at 22:04

## 2023-09-08 NOTE — ED NOTES
Pt logged rolled. C spine maintained.  No step offs, deformities, signs of trauma, or tenderness      Ming Wood, MARIELA  09/08/23 7651 [facility] [address], RN  09/08/23 9450
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
## 2023-09-09 VITALS
RESPIRATION RATE: 18 BRPM | SYSTOLIC BLOOD PRESSURE: 119 MMHG | SYSTOLIC BLOOD PRESSURE: 119 MMHG | TEMPERATURE: 97.3 F | HEART RATE: 71 BPM | BODY MASS INDEX: 39.4 KG/M2 | DIASTOLIC BLOOD PRESSURE: 82 MMHG | HEART RATE: 71 BPM | OXYGEN SATURATION: 97 % | OXYGEN SATURATION: 97 % | BODY MASS INDEX: 39.4 KG/M2 | HEIGHT: 68 IN | WEIGHT: 260 LBS | DIASTOLIC BLOOD PRESSURE: 82 MMHG | WEIGHT: 260 LBS | RESPIRATION RATE: 18 BRPM | TEMPERATURE: 97.3 F | HEIGHT: 68 IN

## 2023-09-09 LAB
BASOPHILS # BLD: 0.04 K/UL (ref 0–0.2)
BASOPHILS # BLD: 0.04 K/UL (ref 0–0.2)
BASOPHILS NFR BLD: 1 % (ref 0–2)
BASOPHILS NFR BLD: 1 % (ref 0–2)
EOSINOPHIL # BLD: 0.06 K/UL (ref 0.05–0.5)
EOSINOPHIL # BLD: 0.06 K/UL (ref 0.05–0.5)
EOSINOPHILS RELATIVE PERCENT: 1 % (ref 0–6)
EOSINOPHILS RELATIVE PERCENT: 1 % (ref 0–6)
ERYTHROCYTE [DISTWIDTH] IN BLOOD BY AUTOMATED COUNT: 14.6 % (ref 11.5–15)
ERYTHROCYTE [DISTWIDTH] IN BLOOD BY AUTOMATED COUNT: 14.6 % (ref 11.5–15)
HCT VFR BLD AUTO: 39.3 % (ref 34–48)
HCT VFR BLD AUTO: 39.3 % (ref 34–48)
HGB BLD-MCNC: 13.4 G/DL (ref 11.5–15.5)
HGB BLD-MCNC: 13.4 G/DL (ref 11.5–15.5)
IMM GRANULOCYTES # BLD AUTO: 0.04 K/UL (ref 0–0.58)
IMM GRANULOCYTES # BLD AUTO: 0.04 K/UL (ref 0–0.58)
IMM GRANULOCYTES NFR BLD: 1 % (ref 0–5)
IMM GRANULOCYTES NFR BLD: 1 % (ref 0–5)
LYMPHOCYTES NFR BLD: 1.49 K/UL (ref 1.5–4)
LYMPHOCYTES NFR BLD: 1.49 K/UL (ref 1.5–4)
LYMPHOCYTES RELATIVE PERCENT: 23 % (ref 20–42)
LYMPHOCYTES RELATIVE PERCENT: 23 % (ref 20–42)
MCH RBC QN AUTO: 35.1 PG (ref 26–35)
MCH RBC QN AUTO: 35.1 PG (ref 26–35)
MCHC RBC AUTO-ENTMCNC: 34.1 G/DL (ref 32–34.5)
MCHC RBC AUTO-ENTMCNC: 34.1 G/DL (ref 32–34.5)
MCV RBC AUTO: 102.9 FL (ref 80–99.9)
MCV RBC AUTO: 102.9 FL (ref 80–99.9)
MONOCYTES NFR BLD: 0.7 K/UL (ref 0.1–0.95)
MONOCYTES NFR BLD: 0.7 K/UL (ref 0.1–0.95)
MONOCYTES NFR BLD: 11 % (ref 2–12)
MONOCYTES NFR BLD: 11 % (ref 2–12)
NEUTROPHILS NFR BLD: 64 % (ref 43–80)
NEUTROPHILS NFR BLD: 64 % (ref 43–80)
NEUTS SEG NFR BLD: 4.05 K/UL (ref 1.8–7.3)
NEUTS SEG NFR BLD: 4.05 K/UL (ref 1.8–7.3)
PLATELET # BLD AUTO: 196 K/UL (ref 130–450)
PLATELET # BLD AUTO: 196 K/UL (ref 130–450)
PMV BLD AUTO: 10.1 FL (ref 7–12)
PMV BLD AUTO: 10.1 FL (ref 7–12)
RBC # BLD AUTO: 3.82 M/UL (ref 3.5–5.5)
RBC # BLD AUTO: 3.82 M/UL (ref 3.5–5.5)
WBC OTHER # BLD: 6.4 K/UL (ref 4.5–11.5)
WBC OTHER # BLD: 6.4 K/UL (ref 4.5–11.5)

## 2023-09-09 PROCEDURE — 97165 OT EVAL LOW COMPLEX 30 MIN: CPT

## 2023-09-09 PROCEDURE — G0378 HOSPITAL OBSERVATION PER HR: HCPCS

## 2023-09-09 PROCEDURE — 97161 PT EVAL LOW COMPLEX 20 MIN: CPT

## 2023-09-09 PROCEDURE — 36415 COLL VENOUS BLD VENIPUNCTURE: CPT

## 2023-09-09 PROCEDURE — 6370000000 HC RX 637 (ALT 250 FOR IP)

## 2023-09-09 PROCEDURE — 85025 COMPLETE CBC W/AUTO DIFF WBC: CPT

## 2023-09-09 RX ORDER — METHOCARBAMOL 500 MG/1
500 TABLET, FILM COATED ORAL 4 TIMES DAILY
Qty: 40 TABLET | Refills: 0 | Status: SHIPPED | OUTPATIENT
Start: 2023-09-09 | End: 2023-09-19

## 2023-09-09 RX ADMIN — IBUPROFEN 800 MG: 800 TABLET, FILM COATED ORAL at 02:37

## 2023-09-09 RX ADMIN — METOPROLOL TARTRATE 100 MG: 50 TABLET ORAL at 08:51

## 2023-09-09 RX ADMIN — METHOCARBAMOL 500 MG: 500 TABLET ORAL at 08:52

## 2023-09-09 ASSESSMENT — PAIN DESCRIPTION - DESCRIPTORS: DESCRIPTORS: DISCOMFORT

## 2023-09-09 ASSESSMENT — PAIN SCALES - GENERAL
PAINLEVEL_OUTOF10: 5
PAINLEVEL_OUTOF10: 1

## 2023-09-09 ASSESSMENT — PAIN DESCRIPTION - PAIN TYPE: TYPE: ACUTE PAIN

## 2023-09-09 ASSESSMENT — PAIN DESCRIPTION - ONSET: ONSET: AWAKENED FROM SLEEP

## 2023-09-09 ASSESSMENT — PAIN DESCRIPTION - LOCATION: LOCATION: ELBOW

## 2023-09-09 ASSESSMENT — PAIN - FUNCTIONAL ASSESSMENT: PAIN_FUNCTIONAL_ASSESSMENT: PREVENTS OR INTERFERES SOME ACTIVE ACTIVITIES AND ADLS

## 2023-09-09 ASSESSMENT — PAIN DESCRIPTION - FREQUENCY: FREQUENCY: INTERMITTENT

## 2023-09-09 ASSESSMENT — PAIN DESCRIPTION - ORIENTATION: ORIENTATION: RIGHT
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
## 2023-09-09 NOTE — DISCHARGE INSTRUCTIONS
TRAUMA SERVICES DISCHARGE INSTRUCTIONS    Call [phone], option 2, for any questions/concerns and for follow-up appointment as needed. Please follow the instructions checked below:    Please follow-up with your primary care provider. ACTIVITY INSTRUCTIONS  Increase activity as tolerated  No heavy lifting or strenuous activity  Take your incentive spirometer home and use 4-6 times/day    WOUND/DRESSING INSTRUCTIONS:  You may shower. No sitting in bath tub, hot tub or swimming until cleared by physician. Ice to areas of pain for first 24 hours. Heat to areas of pain after that. Wash areas of lacerations/abrasions with soap & water. Rinse well. Pat dry with clean towel. Apply thin layer of Bacitracin, Neosporin, or triple antibiotic cream to affected area 2-3 times per day. Keep wounds clean and dry. MEDICATION INSTRUCTIONS  Take medication as prescribed. When taking pain medications, you may experience dizziness or drowsiness. Do not drink alcohol or drive when taking these medications. You may experience constipation while taking pain medication. You may take over the counter stool softeners such as docusate (Colace), sennosides S (Senokot-S), or Miralax. [x]  You may take Ibuprofen (over the counter) as directed for mild pain. --You may take up to 800mg every 8 hours for pain, please take with food or milk. [x]  You may take acetaminophen (Tylenol) products. Do NOT take more than 4000mg of Tylenol in 24h. [x]  Do not take any other acetaminophen (Tylenol) products if you are taking Percocet or Norco, as these contain Tylenol. --Do NOT take more than 4000mg of Tylenol in 24h. OPIOID MEDICATION INSTRUCTIONS  Read the medication guide that is included with your prescription. Take your medication exactly as prescribed. Store medication away from children and in a safe place. Do NOT share your medication with others. Do NOT take medication unless it is prescribed for you.   Do NOT drink alcohol while taking opioids (I.e., Norco, Percocet, Oxycodone, etc). Discuss with the Trauma Clinic staff if the dose of medication you are taking does not control your pain and any side effects that you may be having. CALL 911 OR YOUR LOCAL EMERGENCY SERVICE:  --If you take too much medication  --If you have trouble breathing or shortness of breath  --A child has taken this medication. WORK:  You may not return to work until you receive follow-up with the Trauma Clinic or clearance by all consultants. Call the trauma clinic for any of the following or for questions/concerns;  --fever over 101F  --redness, swelling, hardness or warmth at the wound site(s). --Unrelieved nausea/vomiting  --Foul smelling or cloudy drainage at the wound site(s)  --Unrelieved pain or increase in pain  --Increase in shortness of breath    Follow-up:  Trauma Clinic: [phone] [place] [address], [address] [place] [address]      [employer] is a secure online portal that allows you to access your electronic medical record and send messages to your doctor directly without going to the clinic or picking up the phone. You can also access your test results, communicate with your doctor, pay online, manage your appointments, and request prescription refills. To sign up for [employer], please scan the QR code below.

## 2023-09-09 NOTE — PROGRESS NOTES
CLINICAL PHARMACY NOTE: MEDS TO BEDS    Total # of Prescriptions Filled: 1   The following medications were delivered to the patient:  Methocarbamol 500    Additional Documentation:

## 2023-09-09 NOTE — PLAN OF CARE
Problem: ABCDS Injury Assessment  Goal: Absence of physical injury  9/8/2023 2342 by Walker Ramires RN  Outcome: Progressing     Problem: Pain  Goal: Verbalizes/displays adequate comfort level or baseline comfort level  Outcome: Progressing

## 2023-09-09 NOTE — PROGRESS NOTES
Discharge instructions reviewed with patient. All questions answered. Prescriptions received> iv removed.

## 2023-09-09 NOTE — PROGRESS NOTES
4 Eyes Skin Assessment     NAME:  Saumya Hill  YOB: 1980  MEDICAL RECORD NUMBER:  [id number]    The patient is being assessed for  Admission    I agree that at least one RN has performed a thorough Head to Toe Skin Assessment on the patient. ALL assessment sites listed below have been assessed. Areas assessed by both nurses:    Head, Face, Ears, Shoulders, Back, Chest, Arms, Elbows, Hands, Sacrum. Buttock, Coccyx, Ischium, Legs. Feet and Heels, and Under Medical Devices         Does the Patient have a Wound? Yes wound(s) were present on assessment.  LDA wound assessment was Initiated and completed by RN       Keith Prevention initiated by RN: Yes  Wound Care Orders initiated by RN: Yes    Pressure Injury (Stage 3,4, Unstageable, DTI, NWPT, and Complex wounds) if present, place Wound referral order by RN under : No    New Ostomies, if present place, Ostomy referral order under : No     Nurse 1 eSignature: Electronically signed by Ryan Chand RN on 9/9/23 at 12:11 AM EDT    **SHARE this note so that the co-signing nurse can place an eSignature**    Nurse 2 eSignature: Electronically signed by Ryan Fishman RN on 9/9/23 at 6:42 AM EDT

## 2023-09-11 NOTE — PROGRESS NOTES
PCP is Chico Del Toro DO  Office notified of admission.       Electronically signed by Vee Conley RN on 9/11/2023 at 12:54 PM

## 2023-10-31 LAB — HCG SERPL QL: NORMAL

## 2024-01-30 ENCOUNTER — TELEPHONE (OUTPATIENT)
Dept: BARIATRICS/WEIGHT MGMT | Age: 44
End: 2024-01-30

## 2024-01-30 NOTE — PLAN OF CARE
Problem: Discharge Planning  Goal: Discharge to home or other facility with appropriate resources  Outcome: Adequate for Discharge     Problem: ABCDS Injury Assessment  Goal: Absence of physical injury  9/9/2023 1324 by Daren Yan RN  Outcome: Adequate for Discharge  9/8/2023 2342 by Radha Gregorio RN  Outcome: Progressing  9/8/2023 2342 by Radha Gregorio RN  Outcome: Progressing     Problem: Pain  Goal: Verbalizes/displays adequate comfort level or baseline comfort level  9/9/2023 1324 by Daren Yan RN  Outcome: Adequate for Discharge  9/8/2023 2342 by Radha Gregorio RN  Outcome: Progressing [Well Developed] : well developed [Well Nourished] : well nourished [No Acute Distress] : no acute distress [Normal Conjunctiva] : normal conjunctiva [Normal Venous Pressure] : normal venous pressure [No Carotid Bruit] : no carotid bruit [Normal S1, S2] : normal S1, S2 [No Murmur] : no murmur [No Rub] : no rub [No Gallop] : no gallop [Clear Lung Fields] : clear lung fields [Good Air Entry] : good air entry [No Respiratory Distress] : no respiratory distress  [Soft] : abdomen soft [Non Tender] : non-tender [No Masses/organomegaly] : no masses/organomegaly [Normal Bowel Sounds] : normal bowel sounds [Normal Gait] : normal gait [No Edema] : no edema [No Cyanosis] : no cyanosis [No Clubbing] : no clubbing [No Varicosities] : no varicosities [Moves all extremities] : moves all extremities [No Focal Deficits] : no focal deficits [Normal Speech] : normal speech [Alert and Oriented] : alert and oriented [Normal memory] : normal memory [de-identified] : Rapid, irregular rhythm.

## 2024-01-30 NOTE — TELEPHONE ENCOUNTER
+ Alcoholism (in last year)  + Drug use (in last year)  Patient does not qualify as per our guidelines. Patient must be alcohol and drug free for a year before starting our program. Dr. Morris reviewed patient's chart and was in agreement.    Ref. Closed - patient notified.

## 2024-02-01 ENCOUNTER — TELEPHONE (OUTPATIENT)
Dept: BARIATRICS/WEIGHT MGMT | Age: 44
End: 2024-02-01

## 2024-02-01 NOTE — TELEPHONE ENCOUNTER
Spoke to patient about her concerns that she must be alcohol free for a year before proceeding with bariatric surgery, per our requirements. She was very upset and does not agree with our requirement. She complained for a long time and then hung up on me.

## 2024-02-01 NOTE — TELEPHONE ENCOUNTER
Patient called in to inquire about why her referral was closed. As per 1/30/24 note... Patient must be alcohol and drug free for 1 year before being scheduled to start our program. Dr. Morris is in agreement.     Last noted alcohol/drug abuse was 9/2023

## 2024-03-07 ENCOUNTER — HOSPITAL ENCOUNTER (EMERGENCY)
Age: 44
Discharge: HOME | End: 2024-03-07
Attending: STUDENT IN AN ORGANIZED HEALTH CARE EDUCATION/TRAINING PROGRAM
Payer: MEDICAID

## 2024-03-07 ENCOUNTER — APPOINTMENT (OUTPATIENT)
Dept: CT IMAGING | Age: 44
End: 2024-03-07
Payer: MEDICAID

## 2024-03-07 VITALS
OXYGEN SATURATION: 97 % | SYSTOLIC BLOOD PRESSURE: 104 MMHG | RESPIRATION RATE: 25 BRPM | DIASTOLIC BLOOD PRESSURE: 83 MMHG | HEART RATE: 92 BPM | WEIGHT: 265 LBS | BODY MASS INDEX: 40.16 KG/M2 | TEMPERATURE: 97.5 F | HEIGHT: 68 IN

## 2024-03-07 DIAGNOSIS — E86.0 DEHYDRATION: ICD-10-CM

## 2024-03-07 DIAGNOSIS — R11.2 NAUSEA AND VOMITING, UNSPECIFIED VOMITING TYPE: Primary | ICD-10-CM

## 2024-03-07 LAB
ALBUMIN SERPL-MCNC: 4.8 G/DL (ref 3.5–5.2)
ALP SERPL-CCNC: 69 U/L (ref 35–104)
ALT SERPL-CCNC: 25 U/L (ref 0–32)
ANION GAP SERPL CALCULATED.3IONS-SCNC: 15 MMOL/L (ref 7–16)
AST SERPL-CCNC: 22 U/L (ref 0–31)
BACTERIA URNS QL MICRO: ABNORMAL
BASOPHILS # BLD: 0 K/UL (ref 0–0.2)
BASOPHILS NFR BLD: 0 % (ref 0–2)
BILIRUB SERPL-MCNC: 1.1 MG/DL (ref 0–1.2)
BILIRUB UR QL STRIP: NEGATIVE
BUN SERPL-MCNC: 15 MG/DL (ref 6–20)
CALCIUM SERPL-MCNC: 9.9 MG/DL (ref 8.6–10.2)
CHLORIDE SERPL-SCNC: 103 MMOL/L (ref 98–107)
CLARITY UR: CLEAR
CO2 SERPL-SCNC: 23 MMOL/L (ref 22–29)
COLOR UR: YELLOW
CREAT SERPL-MCNC: 0.7 MG/DL (ref 0.5–1)
EKG ATRIAL RATE: 105 BPM
EKG P AXIS: 53 DEGREES
EKG P-R INTERVAL: 186 MS
EKG Q-T INTERVAL: 344 MS
EKG QRS DURATION: 80 MS
EKG QTC CALCULATION (BAZETT): 454 MS
EKG R AXIS: 55 DEGREES
EKG T AXIS: 59 DEGREES
EKG VENTRICULAR RATE: 105 BPM
EOSINOPHIL # BLD: 0.19 K/UL (ref 0.05–0.5)
EOSINOPHILS RELATIVE PERCENT: 2 % (ref 0–6)
ERYTHROCYTE [DISTWIDTH] IN BLOOD BY AUTOMATED COUNT: 12.4 % (ref 11.5–15)
GFR SERPL CREATININE-BSD FRML MDRD: >60 ML/MIN/1.73M2
GLUCOSE SERPL-MCNC: 133 MG/DL (ref 74–99)
GLUCOSE UR STRIP-MCNC: NEGATIVE MG/DL
HCG, URINE, POC: NEGATIVE
HCT VFR BLD AUTO: 52.5 % (ref 34–48)
HGB BLD-MCNC: 17.4 G/DL (ref 11.5–15.5)
HGB UR QL STRIP.AUTO: ABNORMAL
INFLUENZA A BY PCR: NOT DETECTED
INFLUENZA B BY PCR: NOT DETECTED
KETONES UR STRIP-MCNC: NEGATIVE MG/DL
LACTATE BLDV-SCNC: 1.9 MMOL/L (ref 0.5–2.2)
LACTATE BLDV-SCNC: 3 MMOL/L (ref 0.5–2.2)
LEUKOCYTE ESTERASE UR QL STRIP: NEGATIVE
LIPASE SERPL-CCNC: 29 U/L (ref 13–60)
LYMPHOCYTES NFR BLD: 0.19 K/UL (ref 1.5–4)
LYMPHOCYTES RELATIVE PERCENT: 2 % (ref 20–42)
Lab: NORMAL
MCH RBC QN AUTO: 33.5 PG (ref 26–35)
MCHC RBC AUTO-ENTMCNC: 33.1 G/DL (ref 32–34.5)
MCV RBC AUTO: 101.2 FL (ref 80–99.9)
MONOCYTES NFR BLD: 0.49 K/UL (ref 0.1–0.95)
MONOCYTES NFR BLD: 4 % (ref 2–12)
NEGATIVE QC PASS/FAIL: NORMAL
NEUTROPHILS NFR BLD: 92 % (ref 43–80)
NEUTS SEG NFR BLD: 10.32 K/UL (ref 1.8–7.3)
NITRITE UR QL STRIP: NEGATIVE
PH UR STRIP: 5.5 [PH] (ref 5–9)
PLATELET # BLD AUTO: 202 K/UL (ref 130–450)
PMV BLD AUTO: 10.4 FL (ref 7–12)
POSITIVE QC PASS/FAIL: NORMAL
POTASSIUM SERPL-SCNC: 4 MMOL/L (ref 3.5–5)
PROT SERPL-MCNC: 8 G/DL (ref 6.4–8.3)
PROT UR STRIP-MCNC: NEGATIVE MG/DL
RBC # BLD AUTO: 5.19 M/UL (ref 3.5–5.5)
RBC # BLD: ABNORMAL 10*6/UL
RBC #/AREA URNS HPF: ABNORMAL /HPF
SARS-COV-2 RDRP RESP QL NAA+PROBE: NOT DETECTED
SODIUM SERPL-SCNC: 141 MMOL/L (ref 132–146)
SP GR UR STRIP: 1.02 (ref 1–1.03)
SPECIMEN DESCRIPTION: NORMAL
TROPONIN I SERPL HS-MCNC: <6 NG/L (ref 0–9)
UROBILINOGEN UR STRIP-ACNC: 0.2 EU/DL (ref 0–1)
WBC #/AREA URNS HPF: ABNORMAL /HPF
WBC OTHER # BLD: 11.2 K/UL (ref 4.5–11.5)

## 2024-03-07 PROCEDURE — 83690 ASSAY OF LIPASE: CPT

## 2024-03-07 PROCEDURE — 93005 ELECTROCARDIOGRAM TRACING: CPT | Performed by: STUDENT IN AN ORGANIZED HEALTH CARE EDUCATION/TRAINING PROGRAM

## 2024-03-07 PROCEDURE — 93010 ELECTROCARDIOGRAM REPORT: CPT | Performed by: INTERNAL MEDICINE

## 2024-03-07 PROCEDURE — 84484 ASSAY OF TROPONIN QUANT: CPT

## 2024-03-07 PROCEDURE — 96374 THER/PROPH/DIAG INJ IV PUSH: CPT

## 2024-03-07 PROCEDURE — 96361 HYDRATE IV INFUSION ADD-ON: CPT

## 2024-03-07 PROCEDURE — 6360000002 HC RX W HCPCS: Performed by: STUDENT IN AN ORGANIZED HEALTH CARE EDUCATION/TRAINING PROGRAM

## 2024-03-07 PROCEDURE — 6360000004 HC RX CONTRAST MEDICATION: Performed by: RADIOLOGY

## 2024-03-07 PROCEDURE — 99285 EMERGENCY DEPT VISIT HI MDM: CPT

## 2024-03-07 PROCEDURE — 87635 SARS-COV-2 COVID-19 AMP PRB: CPT

## 2024-03-07 PROCEDURE — 2580000003 HC RX 258: Performed by: EMERGENCY MEDICINE

## 2024-03-07 PROCEDURE — 2580000003 HC RX 258: Performed by: STUDENT IN AN ORGANIZED HEALTH CARE EDUCATION/TRAINING PROGRAM

## 2024-03-07 PROCEDURE — 85025 COMPLETE CBC W/AUTO DIFF WBC: CPT

## 2024-03-07 PROCEDURE — 74177 CT ABD & PELVIS W/CONTRAST: CPT

## 2024-03-07 PROCEDURE — 87502 INFLUENZA DNA AMP PROBE: CPT

## 2024-03-07 PROCEDURE — 80053 COMPREHEN METABOLIC PANEL: CPT

## 2024-03-07 PROCEDURE — 96372 THER/PROPH/DIAG INJ SC/IM: CPT

## 2024-03-07 PROCEDURE — 81001 URINALYSIS AUTO W/SCOPE: CPT

## 2024-03-07 PROCEDURE — 83605 ASSAY OF LACTIC ACID: CPT

## 2024-03-07 RX ORDER — 0.9 % SODIUM CHLORIDE 0.9 %
1000 INTRAVENOUS SOLUTION INTRAVENOUS ONCE
Status: COMPLETED | OUTPATIENT
Start: 2024-03-07 | End: 2024-03-07

## 2024-03-07 RX ORDER — DICYCLOMINE HCL 20 MG
20 TABLET ORAL EVERY 6 HOURS
Qty: 28 TABLET | Refills: 0 | Status: SHIPPED | OUTPATIENT
Start: 2024-03-07

## 2024-03-07 RX ORDER — PROMETHAZINE HYDROCHLORIDE 25 MG/ML
12.5 INJECTION, SOLUTION INTRAMUSCULAR; INTRAVENOUS ONCE
Status: COMPLETED | OUTPATIENT
Start: 2024-03-07 | End: 2024-03-07

## 2024-03-07 RX ORDER — KETOROLAC TROMETHAMINE 15 MG/ML
15 INJECTION, SOLUTION INTRAMUSCULAR; INTRAVENOUS ONCE
Status: COMPLETED | OUTPATIENT
Start: 2024-03-07 | End: 2024-03-07

## 2024-03-07 RX ORDER — ONDANSETRON 4 MG/1
4 TABLET, FILM COATED ORAL EVERY 8 HOURS PRN
Qty: 12 TABLET | Refills: 0 | Status: SHIPPED | OUTPATIENT
Start: 2024-03-07 | End: 2024-03-12

## 2024-03-07 RX ADMIN — SODIUM CHLORIDE 1000 ML: 9 INJECTION, SOLUTION INTRAVENOUS at 10:00

## 2024-03-07 RX ADMIN — SODIUM CHLORIDE 1000 ML: 9 INJECTION, SOLUTION INTRAVENOUS at 15:36

## 2024-03-07 RX ADMIN — PROMETHAZINE HYDROCHLORIDE 12.5 MG: 25 INJECTION INTRAMUSCULAR; INTRAVENOUS at 09:46

## 2024-03-07 RX ADMIN — SODIUM CHLORIDE 1000 ML: 9 INJECTION, SOLUTION INTRAVENOUS at 16:46

## 2024-03-07 RX ADMIN — KETOROLAC TROMETHAMINE 15 MG: 15 INJECTION, SOLUTION INTRAMUSCULAR; INTRAVENOUS at 14:39

## 2024-03-07 RX ADMIN — IOPAMIDOL 75 ML: 755 INJECTION, SOLUTION INTRAVENOUS at 12:52

## 2024-03-07 RX ADMIN — SODIUM CHLORIDE 1000 ML: 9 INJECTION, SOLUTION INTRAVENOUS at 14:39

## 2024-03-07 ASSESSMENT — LIFESTYLE VARIABLES
HOW MANY STANDARD DRINKS CONTAINING ALCOHOL DO YOU HAVE ON A TYPICAL DAY: 1 OR 2
HOW OFTEN DO YOU HAVE A DRINK CONTAINING ALCOHOL: NEVER

## 2024-03-07 ASSESSMENT — ENCOUNTER SYMPTOMS
EYE DISCHARGE: 0
SHORTNESS OF BREATH: 0
DIARRHEA: 0
SINUS PRESSURE: 0
NAUSEA: 1
COUGH: 0
EYE REDNESS: 0
SORE THROAT: 0
ABDOMINAL DISTENTION: 0
ABDOMINAL PAIN: 1
EYE PAIN: 0
BACK PAIN: 0
WHEEZING: 0
VOMITING: 1

## 2024-03-07 ASSESSMENT — PAIN - FUNCTIONAL ASSESSMENT: PAIN_FUNCTIONAL_ASSESSMENT: 0-10

## 2024-03-07 ASSESSMENT — PAIN DESCRIPTION - LOCATION
LOCATION: GENERALIZED
LOCATION: ABDOMEN

## 2024-03-07 ASSESSMENT — PAIN SCALES - GENERAL
PAINLEVEL_OUTOF10: 6
PAINLEVEL_OUTOF10: 7

## 2024-03-07 NOTE — ED PROVIDER NOTES
Department of Emergency Medicine   ED  Provider Note  Admit Date/RoomTime: 3/7/2024  8:17 AM  ED Room: 16/16              [facility]     Katie Camara is a 43 y.o. female with a PMHx significant for  PVCS, HTN, HLD, GERD,  who presents for evaluation of abdominal pain, nausea, and vomiting, beginning prior to arrival.  The complaint has been persistent, moderate in severity, and worsened by nothing.   The patient states that she can't do this anymore. Note that yesterday morning she felt a little gassy and tired. Then notes last night she went to sleep at 2200 woke up at 0400 nauseated. States that she has been up ever since, she keeps throwing up. Notes pain in her abdomen that is squeezing in nature.      Review of Systems   Constitutional:  Negative for chills and fever.   HENT:  Negative for ear pain, sinus pressure and sore throat.    Eyes:  Negative for pain, discharge and redness.   Respiratory:  Negative for cough, shortness of breath and wheezing.    Cardiovascular:  Negative for chest pain.   Gastrointestinal:  Positive for abdominal pain, nausea and vomiting. Negative for abdominal distention and diarrhea.   Genitourinary:  Negative for dysuria and frequency.   Musculoskeletal:  Negative for arthralgias and back pain.   Skin:  Negative for rash and wound.   Neurological:  Negative for weakness and headaches.   Hematological:  Negative for adenopathy.   All other systems reviewed and are negative.       Physical Exam     Procedures    MDM             Differential diagnosis: ***  Review of ED/ Outpatient Records: ***  Historians that case was discussed with: ***  My EKG interpretation: See ED course  Imaging Non-plain film images such as CT, Ultrasound and MRI are read by the radiologist. Plain radiographic images are visualized and preliminarily interpreted by the ED provider:***  Discussed with other providers: ***  Tests Considered but not ordered: ***  Decision making tools/risks stratification / MDM /  process requiring hospitalization or inpatient management.  They have remained hemodynamically stable throughout their entire ED visit and are stable for discharge with outpatient follow-up.     The plan has been discussed in detail and they are aware of the specific conditions for emergent return, as well as the importance of follow-up.      Discharge Medication List as of 3/7/2024  7:54 PM        START taking these medications    Details   ondansetron (ZOFRAN) 4 MG tablet Take 1 tablet by mouth every 8 hours as needed for Nausea or Vomiting, Disp-12 tablet, R-0Normal      dicyclomine (BENTYL) 20 MG tablet Take 1 tablet by mouth every 6 hours, Disp-28 tablet, R-0Normal             Diagnosis:  1. Nausea and vomiting, unspecified vomiting type    2. Dehydration        Disposition:  Patient's disposition: Discharge to home  Patient's condition is stable.       Srinivas Sheikh DO  03/20/24 9216

## 2024-03-31 ENCOUNTER — APPOINTMENT (OUTPATIENT)
Dept: CT IMAGING | Age: 44
End: 2024-03-31
Payer: MEDICAID

## 2024-03-31 ENCOUNTER — APPOINTMENT (OUTPATIENT)
Dept: GENERAL RADIOLOGY | Age: 44
End: 2024-03-31
Payer: MEDICAID

## 2024-03-31 ENCOUNTER — HOSPITAL ENCOUNTER (EMERGENCY)
Age: 44
Discharge: HOME | End: 2024-04-01
Attending: EMERGENCY MEDICINE
Payer: MEDICAID

## 2024-03-31 DIAGNOSIS — E86.0 DEHYDRATION: ICD-10-CM

## 2024-03-31 DIAGNOSIS — R07.9 CHEST PAIN, UNSPECIFIED TYPE: ICD-10-CM

## 2024-03-31 DIAGNOSIS — R00.2 PALPITATIONS: ICD-10-CM

## 2024-03-31 DIAGNOSIS — R00.0 TACHYCARDIA: Primary | ICD-10-CM

## 2024-03-31 LAB
ALBUMIN SERPL-MCNC: 4.1 G/DL (ref 3.5–5.2)
ALP SERPL-CCNC: 63 U/L (ref 35–104)
ALT SERPL-CCNC: 142 U/L (ref 0–32)
ANION GAP SERPL CALCULATED.3IONS-SCNC: 14 MMOL/L (ref 7–16)
AST SERPL-CCNC: 164 U/L (ref 0–31)
BASOPHILS # BLD: 0.02 K/UL (ref 0–0.2)
BASOPHILS NFR BLD: 0 % (ref 0–2)
BILIRUB SERPL-MCNC: 0.7 MG/DL (ref 0–1.2)
BUN SERPL-MCNC: 6 MG/DL (ref 6–20)
CALCIUM SERPL-MCNC: 8.6 MG/DL (ref 8.6–10.2)
CHLORIDE SERPL-SCNC: 99 MMOL/L (ref 98–107)
CO2 SERPL-SCNC: 26 MMOL/L (ref 22–29)
CREAT SERPL-MCNC: 0.7 MG/DL (ref 0.5–1)
D DIMER: 2104 NG/ML DDU (ref 0–232)
EOSINOPHIL # BLD: 0.09 K/UL (ref 0.05–0.5)
EOSINOPHILS RELATIVE PERCENT: 2 % (ref 0–6)
ERYTHROCYTE [DISTWIDTH] IN BLOOD BY AUTOMATED COUNT: 13.7 % (ref 11.5–15)
GFR SERPL CREATININE-BSD FRML MDRD: >90 ML/MIN/1.73M2
GLUCOSE SERPL-MCNC: 93 MG/DL (ref 74–99)
HCT VFR BLD AUTO: 43.1 % (ref 34–48)
HGB BLD-MCNC: 14.6 G/DL (ref 11.5–15.5)
IMM GRANULOCYTES # BLD AUTO: <0.03 K/UL (ref 0–0.58)
IMM GRANULOCYTES NFR BLD: 0 % (ref 0–5)
LYMPHOCYTES NFR BLD: 1.78 K/UL (ref 1.5–4)
LYMPHOCYTES RELATIVE PERCENT: 37 % (ref 20–42)
MAGNESIUM SERPL-MCNC: 1.5 MG/DL (ref 1.6–2.6)
MCH RBC QN AUTO: 33.3 PG (ref 26–35)
MCHC RBC AUTO-ENTMCNC: 33.9 G/DL (ref 32–34.5)
MCV RBC AUTO: 98.2 FL (ref 80–99.9)
MONOCYTES NFR BLD: 0.44 K/UL (ref 0.1–0.95)
MONOCYTES NFR BLD: 9 % (ref 2–12)
NEUTROPHILS NFR BLD: 52 % (ref 43–80)
NEUTS SEG NFR BLD: 2.5 K/UL (ref 1.8–7.3)
PLATELET # BLD AUTO: 142 K/UL (ref 130–450)
PMV BLD AUTO: 9.7 FL (ref 7–12)
POTASSIUM SERPL-SCNC: 3.3 MMOL/L (ref 3.5–5)
PROT SERPL-MCNC: 6.5 G/DL (ref 6.4–8.3)
RBC # BLD AUTO: 4.39 M/UL (ref 3.5–5.5)
SODIUM SERPL-SCNC: 139 MMOL/L (ref 132–146)
TROPONIN I SERPL HS-MCNC: <6 NG/L (ref 0–9)
WBC OTHER # BLD: 4.8 K/UL (ref 4.5–11.5)

## 2024-03-31 PROCEDURE — 96360 HYDRATION IV INFUSION INIT: CPT

## 2024-03-31 PROCEDURE — 2580000003 HC RX 258

## 2024-03-31 PROCEDURE — 85025 COMPLETE CBC W/AUTO DIFF WBC: CPT

## 2024-03-31 PROCEDURE — 71045 X-RAY EXAM CHEST 1 VIEW: CPT

## 2024-03-31 PROCEDURE — 93005 ELECTROCARDIOGRAM TRACING: CPT

## 2024-03-31 PROCEDURE — 71275 CT ANGIOGRAPHY CHEST: CPT

## 2024-03-31 PROCEDURE — 83735 ASSAY OF MAGNESIUM: CPT

## 2024-03-31 PROCEDURE — 85379 FIBRIN DEGRADATION QUANT: CPT

## 2024-03-31 PROCEDURE — 99285 EMERGENCY DEPT VISIT HI MDM: CPT

## 2024-03-31 PROCEDURE — 84484 ASSAY OF TROPONIN QUANT: CPT

## 2024-03-31 PROCEDURE — 80053 COMPREHEN METABOLIC PANEL: CPT

## 2024-03-31 PROCEDURE — 96361 HYDRATE IV INFUSION ADD-ON: CPT

## 2024-03-31 RX ORDER — 0.9 % SODIUM CHLORIDE 0.9 %
1000 INTRAVENOUS SOLUTION INTRAVENOUS ONCE
Status: COMPLETED | OUTPATIENT
Start: 2024-03-31 | End: 2024-04-01

## 2024-03-31 RX ORDER — LANOLIN ALCOHOL/MO/W.PET/CERES
400 CREAM (GRAM) TOPICAL DAILY
Status: DISCONTINUED | OUTPATIENT
Start: 2024-04-01 | End: 2024-04-01

## 2024-03-31 RX ADMIN — SODIUM CHLORIDE 1000 ML: 9 INJECTION, SOLUTION INTRAVENOUS at 23:02

## 2024-04-01 VITALS
RESPIRATION RATE: 17 BRPM | BODY MASS INDEX: 38.65 KG/M2 | HEART RATE: 97 BPM | SYSTOLIC BLOOD PRESSURE: 110 MMHG | DIASTOLIC BLOOD PRESSURE: 78 MMHG | OXYGEN SATURATION: 98 % | WEIGHT: 255 LBS | HEIGHT: 68 IN | TEMPERATURE: 97.9 F

## 2024-04-01 LAB
EKG ATRIAL RATE: 107 BPM
EKG P AXIS: 32 DEGREES
EKG P-R INTERVAL: 164 MS
EKG Q-T INTERVAL: 366 MS
EKG QRS DURATION: 82 MS
EKG QTC CALCULATION (BAZETT): 488 MS
EKG R AXIS: 93 DEGREES
EKG T AXIS: -6 DEGREES
EKG VENTRICULAR RATE: 107 BPM
HCG, URINE, POC: NEGATIVE
Lab: NORMAL
NEGATIVE QC PASS/FAIL: NORMAL
POSITIVE QC PASS/FAIL: NORMAL

## 2024-04-01 PROCEDURE — 6360000004 HC RX CONTRAST MEDICATION: Performed by: RADIOLOGY

## 2024-04-01 PROCEDURE — 6370000000 HC RX 637 (ALT 250 FOR IP)

## 2024-04-01 PROCEDURE — 93010 ELECTROCARDIOGRAM REPORT: CPT | Performed by: INTERNAL MEDICINE

## 2024-04-01 RX ORDER — LANOLIN ALCOHOL/MO/W.PET/CERES
400 CREAM (GRAM) TOPICAL ONCE
Status: COMPLETED | OUTPATIENT
Start: 2024-04-01 | End: 2024-04-01

## 2024-04-01 RX ORDER — POTASSIUM CHLORIDE 20 MEQ/1
20 TABLET, EXTENDED RELEASE ORAL ONCE
Status: COMPLETED | OUTPATIENT
Start: 2024-04-01 | End: 2024-04-01

## 2024-04-01 RX ADMIN — POTASSIUM CHLORIDE 20 MEQ: 1500 TABLET, EXTENDED RELEASE ORAL at 01:14

## 2024-04-01 RX ADMIN — Medication 400 MG: at 00:53

## 2024-04-01 RX ADMIN — IOPAMIDOL 75 ML: 755 INJECTION, SOLUTION INTRAVENOUS at 00:02

## 2024-04-01 NOTE — ED PROVIDER NOTES
Irregular heartbeat, Migraine, Migraines, Miscarriage, Morbid obesity with BMI of 40.0-44.9, adult (HCC), MRSA infection of labia, MVP (mitral valve prolapse), Panic attacks, PCOS (polycystic ovarian syndrome), Pilonidal cyst, Rapid heart beat, Sinus tachycardia, SOBOE (shortness of breath on exertion), Tobacco abuse, and Vitamin D deficiency.    Past Surgical History:  has a past surgical history that includes Tonsillectomy (1991); Breast lumpectomy (Right, 1995); Pilonidal cyst excision (2000); transesophageal echocardiogram (2004); Colonoscopy (11/02/2011); and Upper gastrointestinal endoscopy (N/A, 12/28/2018).    Social History:  reports that she has been smoking cigarettes. She has a 7.5 pack-year smoking history. She has never used smokeless tobacco. She reports current alcohol use. She reports that she does not use drugs.    Family History: family history includes Breast Cancer (age of onset: 25) in her maternal aunt; COPD in her maternal grandfather; Cancer (age of onset: 40) in her maternal aunt; Cancer (age of onset: 63) in her father; Heart Attack (age of onset: 50) in her paternal grandfather; Heart Attack (age of onset: 55) in her father.     The patient’s home medications have been reviewed.    Allergies: Reglan [metoclopramide] and Reglan [metoclopramide]    REVIEW OF EXTERNAL NOTE :      Chart reviewed:         Previous Echo reviewed by me:  Lab Results   Component Value Date    LVEF 71 02/08/2018        No results found for this or any previous visit.       Controlled Substance Monitored by me:   Acute and Chronic Pain Monitoring:        No data to display                   ---------------------------------------------------PHYSICAL EXAM--------------------------------------      Constitutional/General: Alert and oriented x3, well appearing, non toxic in NAD  Head: Normocephalic and atraumatic  Eyes: EOMI, PERRL  Nose: Nose normal. No Septal Hematoma or Deviation   Mouth: Oropharynx clear and dry,

## 2024-06-30 ENCOUNTER — HOSPITAL ENCOUNTER (EMERGENCY)
Age: 44
Discharge: HOME | End: 2024-06-30
Attending: EMERGENCY MEDICINE
Payer: MEDICAID

## 2024-06-30 ENCOUNTER — APPOINTMENT (OUTPATIENT)
Dept: GENERAL RADIOLOGY | Age: 44
End: 2024-06-30
Payer: MEDICAID

## 2024-06-30 VITALS
DIASTOLIC BLOOD PRESSURE: 91 MMHG | RESPIRATION RATE: 18 BRPM | BODY MASS INDEX: 41.06 KG/M2 | WEIGHT: 270 LBS | HEART RATE: 71 BPM | OXYGEN SATURATION: 100 % | TEMPERATURE: 98.8 F | SYSTOLIC BLOOD PRESSURE: 132 MMHG

## 2024-06-30 DIAGNOSIS — R07.9 CHEST PAIN, UNSPECIFIED TYPE: Primary | ICD-10-CM

## 2024-06-30 LAB
ALBUMIN SERPL-MCNC: 4.3 G/DL (ref 3.5–5.2)
ALP SERPL-CCNC: 75 U/L (ref 35–104)
ALT SERPL-CCNC: 33 U/L (ref 0–32)
ANION GAP SERPL CALCULATED.3IONS-SCNC: 10 MMOL/L (ref 7–16)
AST SERPL-CCNC: 25 U/L (ref 0–31)
BACTERIA URNS QL MICRO: ABNORMAL
BASOPHILS # BLD: 0.03 K/UL (ref 0–0.2)
BASOPHILS NFR BLD: 0 % (ref 0–2)
BILIRUB SERPL-MCNC: 0.6 MG/DL (ref 0–1.2)
BILIRUB UR QL STRIP: ABNORMAL
BNP SERPL-MCNC: 42 PG/ML (ref 0–125)
BUN SERPL-MCNC: 11 MG/DL (ref 6–20)
CALCIUM SERPL-MCNC: 9.9 MG/DL (ref 8.6–10.2)
CHLORIDE SERPL-SCNC: 99 MMOL/L (ref 98–107)
CLARITY UR: ABNORMAL
CO2 SERPL-SCNC: 28 MMOL/L (ref 22–29)
COLOR UR: YELLOW
CREAT SERPL-MCNC: 0.7 MG/DL (ref 0.5–1)
EOSINOPHIL # BLD: 0.15 K/UL (ref 0.05–0.5)
EOSINOPHILS RELATIVE PERCENT: 1 % (ref 0–6)
EPI CELLS #/AREA URNS HPF: ABNORMAL /HPF
ERYTHROCYTE [DISTWIDTH] IN BLOOD BY AUTOMATED COUNT: 13.4 % (ref 11.5–15)
GFR, ESTIMATED: >90 ML/MIN/1.73M2
GLUCOSE SERPL-MCNC: 106 MG/DL (ref 74–99)
GLUCOSE UR STRIP-MCNC: NEGATIVE MG/DL
HCG, URINE, POC: NEGATIVE
HCT VFR BLD AUTO: 46.9 % (ref 34–48)
HGB BLD-MCNC: 15.8 G/DL (ref 11.5–15.5)
HGB UR QL STRIP.AUTO: ABNORMAL
IMM GRANULOCYTES # BLD AUTO: 0.05 K/UL (ref 0–0.58)
IMM GRANULOCYTES NFR BLD: 1 % (ref 0–5)
KETONES UR STRIP-MCNC: NEGATIVE MG/DL
LACTATE BLDV-SCNC: 0.9 MMOL/L (ref 0.5–2.2)
LEUKOCYTE ESTERASE UR QL STRIP: NEGATIVE
LIPASE SERPL-CCNC: 38 U/L (ref 13–60)
LYMPHOCYTES NFR BLD: 2.2 K/UL (ref 1.5–4)
LYMPHOCYTES RELATIVE PERCENT: 21 % (ref 20–42)
Lab: NORMAL
MCH RBC QN AUTO: 33.8 PG (ref 26–35)
MCHC RBC AUTO-ENTMCNC: 33.7 G/DL (ref 32–34.5)
MCV RBC AUTO: 100.2 FL (ref 80–99.9)
MONOCYTES NFR BLD: 0.99 K/UL (ref 0.1–0.95)
MONOCYTES NFR BLD: 9 % (ref 2–12)
NEGATIVE QC PASS/FAIL: NORMAL
NEUTROPHILS NFR BLD: 68 % (ref 43–80)
NEUTS SEG NFR BLD: 7.31 K/UL (ref 1.8–7.3)
NITRITE UR QL STRIP: NEGATIVE
PH UR STRIP: 6 [PH] (ref 5–9)
PLATELET # BLD AUTO: 241 K/UL (ref 130–450)
PMV BLD AUTO: 10.5 FL (ref 7–12)
POSITIVE QC PASS/FAIL: NORMAL
POTASSIUM SERPL-SCNC: 4 MMOL/L (ref 3.5–5)
PROT SERPL-MCNC: 7.4 G/DL (ref 6.4–8.3)
PROT UR STRIP-MCNC: NEGATIVE MG/DL
RBC # BLD AUTO: 4.68 M/UL (ref 3.5–5.5)
RBC #/AREA URNS HPF: ABNORMAL /HPF
SODIUM SERPL-SCNC: 137 MMOL/L (ref 132–146)
SP GR UR STRIP: >1.03 (ref 1–1.03)
TROPONIN I SERPL HS-MCNC: <6 NG/L (ref 0–9)
TROPONIN I SERPL HS-MCNC: <6 NG/L (ref 0–9)
UROBILINOGEN UR STRIP-ACNC: 0.2 EU/DL (ref 0–1)
WBC #/AREA URNS HPF: ABNORMAL /HPF
WBC OTHER # BLD: 10.7 K/UL (ref 4.5–11.5)

## 2024-06-30 PROCEDURE — C9113 INJ PANTOPRAZOLE SODIUM, VIA: HCPCS

## 2024-06-30 PROCEDURE — 2580000003 HC RX 258: Performed by: NURSE PRACTITIONER

## 2024-06-30 PROCEDURE — 96361 HYDRATE IV INFUSION ADD-ON: CPT

## 2024-06-30 PROCEDURE — 83605 ASSAY OF LACTIC ACID: CPT

## 2024-06-30 PROCEDURE — 71046 X-RAY EXAM CHEST 2 VIEWS: CPT

## 2024-06-30 PROCEDURE — 85025 COMPLETE CBC W/AUTO DIFF WBC: CPT

## 2024-06-30 PROCEDURE — 80053 COMPREHEN METABOLIC PANEL: CPT

## 2024-06-30 PROCEDURE — 83880 ASSAY OF NATRIURETIC PEPTIDE: CPT

## 2024-06-30 PROCEDURE — 83690 ASSAY OF LIPASE: CPT

## 2024-06-30 PROCEDURE — 6370000000 HC RX 637 (ALT 250 FOR IP)

## 2024-06-30 PROCEDURE — 93005 ELECTROCARDIOGRAM TRACING: CPT | Performed by: EMERGENCY MEDICINE

## 2024-06-30 PROCEDURE — 96374 THER/PROPH/DIAG INJ IV PUSH: CPT

## 2024-06-30 PROCEDURE — 99285 EMERGENCY DEPT VISIT HI MDM: CPT

## 2024-06-30 PROCEDURE — 6360000002 HC RX W HCPCS

## 2024-06-30 PROCEDURE — 81001 URINALYSIS AUTO W/SCOPE: CPT

## 2024-06-30 PROCEDURE — 84484 ASSAY OF TROPONIN QUANT: CPT

## 2024-06-30 RX ORDER — PANTOPRAZOLE SODIUM 40 MG/10ML
40 INJECTION, POWDER, LYOPHILIZED, FOR SOLUTION INTRAVENOUS ONCE
Status: COMPLETED | OUTPATIENT
Start: 2024-06-30 | End: 2024-06-30

## 2024-06-30 RX ORDER — 0.9 % SODIUM CHLORIDE 0.9 %
1000 INTRAVENOUS SOLUTION INTRAVENOUS ONCE
Status: COMPLETED | OUTPATIENT
Start: 2024-06-30 | End: 2024-06-30

## 2024-06-30 RX ORDER — PANTOPRAZOLE SODIUM 40 MG/1
40 TABLET, DELAYED RELEASE ORAL
Qty: 30 TABLET | Refills: 0 | Status: SHIPPED | OUTPATIENT
Start: 2024-06-30

## 2024-06-30 RX ADMIN — SODIUM CHLORIDE 1000 ML: 9 INJECTION, SOLUTION INTRAVENOUS at 20:12

## 2024-06-30 RX ADMIN — ALUMINUM HYDROXIDE, MAGNESIUM HYDROXIDE, AND SIMETHICONE: 1200; 120; 1200 SUSPENSION ORAL at 21:40

## 2024-06-30 RX ADMIN — PANTOPRAZOLE SODIUM 40 MG: 40 INJECTION, POWDER, FOR SOLUTION INTRAVENOUS at 21:40

## 2024-06-30 ASSESSMENT — PAIN DESCRIPTION - LOCATION: LOCATION: ABDOMEN;CHEST

## 2024-06-30 ASSESSMENT — PAIN - FUNCTIONAL ASSESSMENT: PAIN_FUNCTIONAL_ASSESSMENT: 0-10

## 2024-06-30 ASSESSMENT — PAIN SCALES - GENERAL: PAINLEVEL_OUTOF10: 7

## 2024-06-30 ASSESSMENT — PAIN DESCRIPTION - DESCRIPTORS: DESCRIPTORS: DISCOMFORT;GNAWING

## 2024-06-30 ASSESSMENT — LIFESTYLE VARIABLES
HOW MANY STANDARD DRINKS CONTAINING ALCOHOL DO YOU HAVE ON A TYPICAL DAY: 1 OR 2
HOW OFTEN DO YOU HAVE A DRINK CONTAINING ALCOHOL: 2-4 TIMES A MONTH

## 2024-07-01 NOTE — DISCHARGE INSTR - COC
Continuity of Care Form    Patient Name: Katie Camara   :  1980  MRN:  [id number]    Admit date:  2024  Discharge date:  ***    Code Status Order: Prior   Advance Directives:     Admitting Physician:  No admitting provider for patient encounter.  PCP: Yaw Sesay DO    Discharging Nurse: ***  Discharging Hospital Unit/Room#: SGWR/SG-WR  Discharging Unit Phone Number: ***    Emergency Contact:   Extended Emergency Contact Information  Primary Emergency Contact: Ned Uriarte  Address: [address] [place] [address] of [facility]  Home Phone: [phone]  Mobile Phone: [phone]  Relation: Child   needed? No    Past Surgical History:  Past Surgical History:   Procedure Laterality Date    BREAST LUMPECTOMY Right 1995    Benign    COLONOSCOPY  2011    Sayed Yossef-normal    PILONIDAL CYST EXCISION      [facility]    TONSILLECTOMY      TRANSESOPHAGEAL ECHOCARDIOGRAM      showed MVP    UPPER GASTROINTESTINAL ENDOSCOPY N/A 2018    EGD BIOPSY performed by Scout Morris MD at [facility] END[facility]COPY       Immunization History:   Immunization History   Administered Date(s) Administered    Influenza, FLUARIX, FLULAVAL, FLUZONE (age 6 mo+) AND AFLURIA, (age 3 y+), PF, 0.5mL 2016    PPD Test 2016, 2017, 2017    TDaP, ADACEL (age 10y-64y), BOOSTRIX (age 10y+), IM, 0.5mL 2016, 10/14/2021       Active Problems:  Patient Active Problem List   Diagnosis Code    Tobacco abuse Z72.0    Anxiety and depression F41.9, F32.A    Sinus tachycardia R00.0    Morbid obesity with BMI of 40.0-44.9, adult (HCC) E66.01, Z68.41    Alcohol abuse F10.10    GERD (gastroesophageal reflux disease) K21.9    Elevated LFTs R79.89    PVC (premature ventricular contraction) I49.3    PAC (premature atrial contraction) I49.1    Fatty liver K76.0    Severe episode of recurrent major depressive disorder, without psychotic features ([facility]) F33.2    Pain, dental

## 2024-07-01 NOTE — ED PROVIDER NOTES
[facility] EMERGENCY DEPARTMENT  EMERGENCY DEPARTMENT ENCOUNTER      Pt Name: Katie Camara  MRN: [id number]  Birthdate 1980  Date of evaluation: 6/30/2024  Provider: Aman Aguilar DO  PCP: Yaw Sesay DO  Note Started: 9:05 PM EDT 6/30/24    CHIEF COMPLAINT       Chief Complaint   Patient presents with    Chest Pain     CP Abdominal pain SOB; sx began last night       HISTORY OF PRESENT ILLNESS: 1 or more Elements   History From: Patient  Limitations to history : None    Katie Camara is a 43 y.o. female who presents with complaints of chest pain described as burning from upper epigastrium radiating midsternally, associated with intermittent shortness of breath, symptoms onset yesterday evening.  Patient reports history of GERD however reports the symptoms are more severe than normal.  Denies history of hiatal hernia however on chart review CT ab pelvis IV contrast from 3/7/2024 did show small hiatal hernia.  Nothing makes symptoms better or worse.  Currently denies emesis, objective fevers or chills, dizziness, weakness, numbness, tingling extremities, dysuria, diarrhea, constipation.    Nursing Notes were all reviewed and agreed with or any disagreements were addressed in the HPI.    REVIEW OF SYSTEMS :    Positives and Pertinent negatives as per HPI.     PAST MEDICAL HISTORY/Chronic Conditions Affecting Care    has a past medical history of Acute alcohol intoxication (HCC), Acute bronchitis, Alcohol abuse, Anxiety and depression, Benzodiazepine abuse (HCC), Breast lump in female, Corneal abrasion, Ear ache, Elevated glucose level (11/3/2016), Elevated liver enzymes, Fatty liver (11/23/2016), Fatty liver disease, nonalcoholic (11/23/2016), Gastroenteritis, GERD (gastroesophageal reflux disease) (2010), Heart palpitations, History of stress test (02/08/2018), Hypokalemia, Irregular heartbeat, Migraine, Migraines, Miscarriage (2012), Morbid obesity with BMI of

## 2024-07-02 LAB
EKG ATRIAL RATE: 72 BPM
EKG P AXIS: -16 DEGREES
EKG P-R INTERVAL: 178 MS
EKG Q-T INTERVAL: 416 MS
EKG QRS DURATION: 82 MS
EKG QTC CALCULATION (BAZETT): 455 MS
EKG R AXIS: 17 DEGREES
EKG T AXIS: 12 DEGREES
EKG VENTRICULAR RATE: 72 BPM

## 2024-07-02 PROCEDURE — 93010 ELECTROCARDIOGRAM REPORT: CPT | Performed by: INTERNAL MEDICINE

## 2024-07-15 ENCOUNTER — APPOINTMENT (OUTPATIENT)
Dept: ULTRASOUND IMAGING | Age: 44
End: 2024-07-15
Payer: MEDICAID

## 2024-07-15 ENCOUNTER — HOSPITAL ENCOUNTER (EMERGENCY)
Age: 44
Discharge: LEFT BEFORE DISCHARGE | End: 2024-07-15
Attending: STUDENT IN AN ORGANIZED HEALTH CARE EDUCATION/TRAINING PROGRAM
Payer: MEDICAID

## 2024-07-15 ENCOUNTER — APPOINTMENT (OUTPATIENT)
Dept: CT IMAGING | Age: 44
End: 2024-07-15
Payer: MEDICAID

## 2024-07-15 VITALS
OXYGEN SATURATION: 97 % | RESPIRATION RATE: 18 BRPM | SYSTOLIC BLOOD PRESSURE: 138 MMHG | TEMPERATURE: 98 F | HEART RATE: 70 BPM | BODY MASS INDEX: 40.92 KG/M2 | WEIGHT: 270 LBS | HEIGHT: 68 IN | DIASTOLIC BLOOD PRESSURE: 94 MMHG

## 2024-07-15 DIAGNOSIS — N94.6 MENSTRUAL PAIN: Primary | ICD-10-CM

## 2024-07-15 LAB
ALBUMIN SERPL-MCNC: 4.3 G/DL (ref 3.5–5.2)
ALP SERPL-CCNC: 75 U/L (ref 35–104)
ALT SERPL-CCNC: 22 U/L (ref 0–32)
ANION GAP SERPL CALCULATED.3IONS-SCNC: 15 MMOL/L (ref 7–16)
AST SERPL-CCNC: 48 U/L (ref 0–31)
BACTERIA URNS QL MICRO: ABNORMAL
BASOPHILS # BLD: 0.02 K/UL (ref 0–0.2)
BASOPHILS NFR BLD: 0 % (ref 0–2)
BILIRUB SERPL-MCNC: 0.7 MG/DL (ref 0–1.2)
BILIRUB UR QL STRIP: NEGATIVE
BUN SERPL-MCNC: 12 MG/DL (ref 6–20)
CALCIUM SERPL-MCNC: 8.4 MG/DL (ref 8.6–10.2)
CHLORIDE SERPL-SCNC: 104 MMOL/L (ref 98–107)
CLARITY UR: CLEAR
CO2 SERPL-SCNC: 23 MMOL/L (ref 22–29)
COLOR UR: YELLOW
CREAT SERPL-MCNC: 0.7 MG/DL (ref 0.5–1)
EOSINOPHIL # BLD: 0.11 K/UL (ref 0.05–0.5)
EOSINOPHILS RELATIVE PERCENT: 2 % (ref 0–6)
ERYTHROCYTE [DISTWIDTH] IN BLOOD BY AUTOMATED COUNT: 13.2 % (ref 11.5–15)
GFR, ESTIMATED: >90 ML/MIN/1.73M2
GLUCOSE SERPL-MCNC: 118 MG/DL (ref 74–99)
GLUCOSE UR STRIP-MCNC: NEGATIVE MG/DL
HCG, URINE, POC: NEGATIVE
HCT VFR BLD AUTO: 45.6 % (ref 34–48)
HGB BLD-MCNC: 15.8 G/DL (ref 11.5–15.5)
HGB UR QL STRIP.AUTO: ABNORMAL
IMM GRANULOCYTES # BLD AUTO: <0.03 K/UL (ref 0–0.58)
IMM GRANULOCYTES NFR BLD: 0 % (ref 0–5)
KETONES UR STRIP-MCNC: NEGATIVE MG/DL
LACTATE BLDV-SCNC: 2.7 MMOL/L (ref 0.5–2.2)
LACTATE BLDV-SCNC: 2.8 MMOL/L (ref 0.5–2.2)
LACTATE BLDV-SCNC: 2.8 MMOL/L (ref 0.5–2.2)
LACTATE BLDV-SCNC: 3.1 MMOL/L (ref 0.5–2.2)
LEUKOCYTE ESTERASE UR QL STRIP: NEGATIVE
LYMPHOCYTES NFR BLD: 2.04 K/UL (ref 1.5–4)
LYMPHOCYTES RELATIVE PERCENT: 31 % (ref 20–42)
Lab: NORMAL
MCH RBC QN AUTO: 33.9 PG (ref 26–35)
MCHC RBC AUTO-ENTMCNC: 34.6 G/DL (ref 32–34.5)
MCV RBC AUTO: 97.9 FL (ref 80–99.9)
MONOCYTES NFR BLD: 0.64 K/UL (ref 0.1–0.95)
MONOCYTES NFR BLD: 10 % (ref 2–12)
MUCOUS THREADS URNS QL MICRO: PRESENT
NEGATIVE QC PASS/FAIL: NORMAL
NEUTROPHILS NFR BLD: 57 % (ref 43–80)
NEUTS SEG NFR BLD: 3.67 K/UL (ref 1.8–7.3)
NITRITE UR QL STRIP: NEGATIVE
PH UR STRIP: 7 [PH] (ref 5–9)
PLATELET # BLD AUTO: 188 K/UL (ref 130–450)
PMV BLD AUTO: 9.3 FL (ref 7–12)
POSITIVE QC PASS/FAIL: NORMAL
POTASSIUM SERPL-SCNC: 3.8 MMOL/L (ref 3.5–5)
PROT SERPL-MCNC: 7.2 G/DL (ref 6.4–8.3)
PROT UR STRIP-MCNC: 30 MG/DL
RBC # BLD AUTO: 4.66 M/UL (ref 3.5–5.5)
RBC #/AREA URNS HPF: ABNORMAL /HPF
SODIUM SERPL-SCNC: 142 MMOL/L (ref 132–146)
SP GR UR STRIP: 1.02 (ref 1–1.03)
UROBILINOGEN UR STRIP-ACNC: 1 EU/DL (ref 0–1)
WBC #/AREA URNS HPF: ABNORMAL /HPF
WBC OTHER # BLD: 6.5 K/UL (ref 4.5–11.5)

## 2024-07-15 PROCEDURE — 99285 EMERGENCY DEPT VISIT HI MDM: CPT

## 2024-07-15 PROCEDURE — 93975 VASCULAR STUDY: CPT

## 2024-07-15 PROCEDURE — 6360000004 HC RX CONTRAST MEDICATION: Performed by: RADIOLOGY

## 2024-07-15 PROCEDURE — 96374 THER/PROPH/DIAG INJ IV PUSH: CPT

## 2024-07-15 PROCEDURE — 2580000003 HC RX 258

## 2024-07-15 PROCEDURE — 80053 COMPREHEN METABOLIC PANEL: CPT

## 2024-07-15 PROCEDURE — 74177 CT ABD & PELVIS W/CONTRAST: CPT

## 2024-07-15 PROCEDURE — 96361 HYDRATE IV INFUSION ADD-ON: CPT

## 2024-07-15 PROCEDURE — 2580000003 HC RX 258: Performed by: EMERGENCY MEDICINE

## 2024-07-15 PROCEDURE — 81001 URINALYSIS AUTO W/SCOPE: CPT

## 2024-07-15 PROCEDURE — 6370000000 HC RX 637 (ALT 250 FOR IP): Performed by: STUDENT IN AN ORGANIZED HEALTH CARE EDUCATION/TRAINING PROGRAM

## 2024-07-15 PROCEDURE — 96375 TX/PRO/DX INJ NEW DRUG ADDON: CPT

## 2024-07-15 PROCEDURE — 85025 COMPLETE CBC W/AUTO DIFF WBC: CPT

## 2024-07-15 PROCEDURE — 2580000003 HC RX 258: Performed by: STUDENT IN AN ORGANIZED HEALTH CARE EDUCATION/TRAINING PROGRAM

## 2024-07-15 PROCEDURE — 6360000002 HC RX W HCPCS

## 2024-07-15 PROCEDURE — 76830 TRANSVAGINAL US NON-OB: CPT

## 2024-07-15 PROCEDURE — 83605 ASSAY OF LACTIC ACID: CPT

## 2024-07-15 RX ORDER — 0.9 % SODIUM CHLORIDE 0.9 %
1000 INTRAVENOUS SOLUTION INTRAVENOUS ONCE
Status: COMPLETED | OUTPATIENT
Start: 2024-07-15 | End: 2024-07-15

## 2024-07-15 RX ORDER — KETOROLAC TROMETHAMINE 15 MG/ML
15 INJECTION, SOLUTION INTRAMUSCULAR; INTRAVENOUS ONCE
Status: COMPLETED | OUTPATIENT
Start: 2024-07-15 | End: 2024-07-15

## 2024-07-15 RX ORDER — 0.9 % SODIUM CHLORIDE 0.9 %
1000 INTRAVENOUS SOLUTION INTRAVENOUS ONCE
Status: DISCONTINUED | OUTPATIENT
Start: 2024-07-15 | End: 2024-07-15

## 2024-07-15 RX ORDER — PROCHLORPERAZINE EDISYLATE 5 MG/ML
10 INJECTION INTRAMUSCULAR; INTRAVENOUS ONCE
Status: COMPLETED | OUTPATIENT
Start: 2024-07-15 | End: 2024-07-15

## 2024-07-15 RX ORDER — NICOTINE 21 MG/24HR
1 PATCH, TRANSDERMAL 24 HOURS TRANSDERMAL DAILY
Status: DISCONTINUED | OUTPATIENT
Start: 2024-07-15 | End: 2024-07-15 | Stop reason: HOSPADM

## 2024-07-15 RX ADMIN — KETOROLAC TROMETHAMINE 15 MG: 15 INJECTION, SOLUTION INTRAMUSCULAR; INTRAVENOUS at 07:53

## 2024-07-15 RX ADMIN — SODIUM CHLORIDE 1000 ML: 9 INJECTION, SOLUTION INTRAVENOUS at 08:58

## 2024-07-15 RX ADMIN — PROCHLORPERAZINE EDISYLATE 10 MG: 5 INJECTION INTRAMUSCULAR; INTRAVENOUS at 08:57

## 2024-07-15 RX ADMIN — SODIUM CHLORIDE 1000 ML: 9 INJECTION, SOLUTION INTRAVENOUS at 18:03

## 2024-07-15 RX ADMIN — SODIUM CHLORIDE 1000 ML: 9 INJECTION, SOLUTION INTRAVENOUS at 15:28

## 2024-07-15 RX ADMIN — SODIUM CHLORIDE 1000 ML: 9 INJECTION, SOLUTION INTRAVENOUS at 06:52

## 2024-07-15 RX ADMIN — IOPAMIDOL 80 ML: 755 INJECTION, SOLUTION INTRAVENOUS at 13:43

## 2024-07-15 ASSESSMENT — PAIN SCALES - GENERAL
PAINLEVEL_OUTOF10: 4
PAINLEVEL_OUTOF10: 7
PAINLEVEL_OUTOF10: 8

## 2024-07-15 ASSESSMENT — PAIN DESCRIPTION - LOCATION
LOCATION: ABDOMEN

## 2024-07-15 ASSESSMENT — PAIN - FUNCTIONAL ASSESSMENT: PAIN_FUNCTIONAL_ASSESSMENT: 0-10

## 2024-07-15 NOTE — ED PROVIDER NOTES
Patient was given IV fluids for rehydration and lactic acidosis.  Compazine given for nausea.  Patient was given 9.9.  On reevaluation, her symptoms had improved.  Due to continued elevated lactic acidosis, mother liter of normal saline was going to be given a repeat lactic acid following the fluids however the patient chose to sign out AGAINST MEDICAL ADVICE.  This patient has chosen to leave against medical advice. It was explained to them that choosing to do so may result in permanent bodily harm, disability, disfigurement, or death. without further evaluation and monitoring there may be unforeseen circumstances and deterioration causing permanent bodily harm or death as a result of their choice.    They are alert, oriented, and have the capacity and are competent at this time to make this decision.  They state that they are aware of the serious risks as explained, but they continue to wish to leave against medical advice.    In light of their decision to leave against medical advice, follow-up has been arranged and they are aware of the importance of following up as instructed.  They have been advised that they should return to the ED immediately if they change their mind at any time, or if their condition begins to change or worsen.    Please see ED course for more details:    ED Course as of 07/16/24 1202   Mon Jul 15, 2024   [id number] ATTENDING PROVIDER ATTESTATION:     I have personally performed and/or participated in the history, exam, medical decision making, and procedures and agree with all pertinent clinical information unless otherwise noted.      I have also reviewed and agree with the past medical, family and social history unless otherwise noted.  I personally reviewed any ECG performed and agree with the resident unless stated below.      I have discussed this patient in detail with the resident, and provided the instruction and education regarding the patient.  My findings/plan: Patient seen and

## 2024-07-15 NOTE — ED NOTES
Patient seen walking down ED snell with IV in arm and street clothes. Patient states she needs to leave because her ride is here. Dr. Sheikh talked to patient and explained need to stay for further testing. Patient agreeable and back in ED room.

## 2024-07-15 NOTE — DISCHARGE INSTRUCTIONS
Please return to the ER for any new or worsening symptoms including but not limited to Fever, Chest pain or difficulty breathing, or Inability to keep down liquids  If prescribed, please be sure to  your prescriptions from the pharmacy  Please follow-up with Primary care provider and OB/GYN as instructed

## 2024-09-10 ENCOUNTER — APPOINTMENT (OUTPATIENT)
Dept: CT IMAGING | Age: 44
End: 2024-09-10
Payer: MEDICAID

## 2024-09-10 ENCOUNTER — HOSPITAL ENCOUNTER (EMERGENCY)
Age: 44
Discharge: HOME | End: 2024-09-10
Attending: EMERGENCY MEDICINE
Payer: MEDICAID

## 2024-09-10 VITALS
DIASTOLIC BLOOD PRESSURE: 97 MMHG | RESPIRATION RATE: 14 BRPM | BODY MASS INDEX: 38.65 KG/M2 | WEIGHT: 255 LBS | HEART RATE: 78 BPM | TEMPERATURE: 97.5 F | OXYGEN SATURATION: 98 % | SYSTOLIC BLOOD PRESSURE: 133 MMHG | HEIGHT: 68 IN

## 2024-09-10 DIAGNOSIS — S09.90XA CLOSED HEAD INJURY, INITIAL ENCOUNTER: Primary | ICD-10-CM

## 2024-09-10 LAB
HCG, URINE, POC: NEGATIVE
Lab: NORMAL
NEGATIVE QC PASS/FAIL: NORMAL
POSITIVE QC PASS/FAIL: NORMAL

## 2024-09-10 PROCEDURE — 72125 CT NECK SPINE W/O DYE: CPT

## 2024-09-10 PROCEDURE — 70450 CT HEAD/BRAIN W/O DYE: CPT

## 2024-09-10 PROCEDURE — 99284 EMERGENCY DEPT VISIT MOD MDM: CPT

## 2024-09-10 RX ORDER — ONDANSETRON 4 MG/1
4 TABLET, ORALLY DISINTEGRATING ORAL EVERY 8 HOURS PRN
Qty: 9 TABLET | Refills: 0 | Status: SHIPPED | OUTPATIENT
Start: 2024-09-10 | End: 2024-09-13

## 2024-09-10 ASSESSMENT — PAIN DESCRIPTION - PAIN TYPE: TYPE: ACUTE PAIN

## 2024-09-10 ASSESSMENT — LIFESTYLE VARIABLES
HOW OFTEN DO YOU HAVE A DRINK CONTAINING ALCOHOL: 2-3 TIMES A WEEK
HOW MANY STANDARD DRINKS CONTAINING ALCOHOL DO YOU HAVE ON A TYPICAL DAY: 3 OR 4

## 2024-09-10 ASSESSMENT — PAIN DESCRIPTION - LOCATION: LOCATION: HEAD

## 2024-09-10 ASSESSMENT — PAIN SCALES - GENERAL: PAINLEVEL_OUTOF10: 6

## 2024-09-10 ASSESSMENT — PAIN DESCRIPTION - DESCRIPTORS: DESCRIPTORS: ACHING

## 2024-09-10 ASSESSMENT — PAIN DESCRIPTION - FREQUENCY: FREQUENCY: CONTINUOUS

## 2024-10-07 ENCOUNTER — HOSPITAL ENCOUNTER (EMERGENCY)
Age: 44
Discharge: HOME | End: 2024-10-07
Attending: EMERGENCY MEDICINE
Payer: MEDICAID

## 2024-10-07 ENCOUNTER — APPOINTMENT (OUTPATIENT)
Dept: CT IMAGING | Age: 44
End: 2024-10-07
Payer: MEDICAID

## 2024-10-07 VITALS
BODY MASS INDEX: 39.4 KG/M2 | HEART RATE: 93 BPM | WEIGHT: 260 LBS | TEMPERATURE: 98.5 F | SYSTOLIC BLOOD PRESSURE: 136 MMHG | HEIGHT: 68 IN | DIASTOLIC BLOOD PRESSURE: 107 MMHG | OXYGEN SATURATION: 98 % | RESPIRATION RATE: 20 BRPM

## 2024-10-07 DIAGNOSIS — N30.00 ACUTE CYSTITIS WITHOUT HEMATURIA: ICD-10-CM

## 2024-10-07 DIAGNOSIS — R10.30 LOWER ABDOMINAL PAIN: Primary | ICD-10-CM

## 2024-10-07 LAB
ALBUMIN SERPL-MCNC: 4.1 G/DL (ref 3.5–5.2)
ALP SERPL-CCNC: 65 U/L (ref 35–104)
ALT SERPL-CCNC: 98 U/L (ref 0–32)
ANION GAP SERPL CALCULATED.3IONS-SCNC: 12 MMOL/L (ref 7–16)
AST SERPL-CCNC: 64 U/L (ref 0–31)
BACTERIA URNS QL MICRO: ABNORMAL
BASOPHILS # BLD: 0.02 K/UL (ref 0–0.2)
BASOPHILS NFR BLD: 0 % (ref 0–2)
BILIRUB SERPL-MCNC: 0.9 MG/DL (ref 0–1.2)
BILIRUB UR QL STRIP: ABNORMAL
BUN SERPL-MCNC: 7 MG/DL (ref 6–20)
CALCIUM SERPL-MCNC: 9.3 MG/DL (ref 8.6–10.2)
CHLORIDE SERPL-SCNC: 99 MMOL/L (ref 98–107)
CLARITY UR: ABNORMAL
CO2 SERPL-SCNC: 27 MMOL/L (ref 22–29)
COLOR UR: YELLOW
CREAT SERPL-MCNC: 0.7 MG/DL (ref 0.5–1)
EOSINOPHIL # BLD: 0.06 K/UL (ref 0.05–0.5)
EOSINOPHILS RELATIVE PERCENT: 1 % (ref 0–6)
EPI CELLS #/AREA URNS HPF: ABNORMAL /HPF
ERYTHROCYTE [DISTWIDTH] IN BLOOD BY AUTOMATED COUNT: 14.6 % (ref 11.5–15)
GFR, ESTIMATED: >90 ML/MIN/1.73M2
GLUCOSE SERPL-MCNC: 107 MG/DL (ref 74–99)
GLUCOSE UR STRIP-MCNC: NEGATIVE MG/DL
HCG, URINE, POC: NEGATIVE
HCT VFR BLD AUTO: 47.5 % (ref 34–48)
HGB BLD-MCNC: 16.4 G/DL (ref 11.5–15.5)
HGB UR QL STRIP.AUTO: NEGATIVE
IMM GRANULOCYTES # BLD AUTO: <0.03 K/UL (ref 0–0.58)
IMM GRANULOCYTES NFR BLD: 0 % (ref 0–5)
KETONES UR STRIP-MCNC: 15 MG/DL
LACTATE BLDV-SCNC: 1 MMOL/L (ref 0.5–2.2)
LEUKOCYTE ESTERASE UR QL STRIP: NEGATIVE
LIPASE SERPL-CCNC: 38 U/L (ref 13–60)
LYMPHOCYTES NFR BLD: 0.66 K/UL (ref 1.5–4)
LYMPHOCYTES RELATIVE PERCENT: 13 % (ref 20–42)
Lab: NORMAL
MCH RBC QN AUTO: 34.7 PG (ref 26–35)
MCHC RBC AUTO-ENTMCNC: 34.5 G/DL (ref 32–34.5)
MCV RBC AUTO: 100.4 FL (ref 80–99.9)
MONOCYTES NFR BLD: 0.73 K/UL (ref 0.1–0.95)
MONOCYTES NFR BLD: 15 % (ref 2–12)
NEGATIVE QC PASS/FAIL: NORMAL
NEUTROPHILS NFR BLD: 70 % (ref 43–80)
NEUTS SEG NFR BLD: 3.52 K/UL (ref 1.8–7.3)
NITRITE UR QL STRIP: NEGATIVE
PH UR STRIP: 6 [PH] (ref 5–9)
PLATELET # BLD AUTO: 151 K/UL (ref 130–450)
PMV BLD AUTO: 9.7 FL (ref 7–12)
POSITIVE QC PASS/FAIL: NORMAL
POTASSIUM SERPL-SCNC: 3.6 MMOL/L (ref 3.5–5)
PROT SERPL-MCNC: 6.7 G/DL (ref 6.4–8.3)
PROT UR STRIP-MCNC: ABNORMAL MG/DL
RBC # BLD AUTO: 4.73 M/UL (ref 3.5–5.5)
RBC #/AREA URNS HPF: ABNORMAL /HPF
SODIUM SERPL-SCNC: 138 MMOL/L (ref 132–146)
SP GR UR STRIP: >1.03 (ref 1–1.03)
UROBILINOGEN UR STRIP-ACNC: 0.2 EU/DL (ref 0–1)
WBC #/AREA URNS HPF: ABNORMAL /HPF
WBC OTHER # BLD: 5 K/UL (ref 4.5–11.5)

## 2024-10-07 PROCEDURE — 87086 URINE CULTURE/COLONY COUNT: CPT

## 2024-10-07 PROCEDURE — 2580000003 HC RX 258: Performed by: EMERGENCY MEDICINE

## 2024-10-07 PROCEDURE — 96375 TX/PRO/DX INJ NEW DRUG ADDON: CPT

## 2024-10-07 PROCEDURE — 81001 URINALYSIS AUTO W/SCOPE: CPT

## 2024-10-07 PROCEDURE — 74177 CT ABD & PELVIS W/CONTRAST: CPT

## 2024-10-07 PROCEDURE — 83605 ASSAY OF LACTIC ACID: CPT

## 2024-10-07 PROCEDURE — 80053 COMPREHEN METABOLIC PANEL: CPT

## 2024-10-07 PROCEDURE — 6360000002 HC RX W HCPCS: Performed by: EMERGENCY MEDICINE

## 2024-10-07 PROCEDURE — 6360000004 HC RX CONTRAST MEDICATION: Performed by: RADIOLOGY

## 2024-10-07 PROCEDURE — 99285 EMERGENCY DEPT VISIT HI MDM: CPT

## 2024-10-07 PROCEDURE — 83690 ASSAY OF LIPASE: CPT

## 2024-10-07 PROCEDURE — 85025 COMPLETE CBC W/AUTO DIFF WBC: CPT

## 2024-10-07 PROCEDURE — 96374 THER/PROPH/DIAG INJ IV PUSH: CPT

## 2024-10-07 RX ORDER — CEFDINIR 300 MG/1
300 CAPSULE ORAL 2 TIMES DAILY
Qty: 20 CAPSULE | Refills: 0 | Status: SHIPPED | OUTPATIENT
Start: 2024-10-07 | End: 2024-10-17

## 2024-10-07 RX ORDER — KETOROLAC TROMETHAMINE 30 MG/ML
30 INJECTION, SOLUTION INTRAMUSCULAR; INTRAVENOUS ONCE
Status: COMPLETED | OUTPATIENT
Start: 2024-10-07 | End: 2024-10-07

## 2024-10-07 RX ORDER — ONDANSETRON 2 MG/ML
4 INJECTION INTRAMUSCULAR; INTRAVENOUS ONCE
Status: COMPLETED | OUTPATIENT
Start: 2024-10-07 | End: 2024-10-07

## 2024-10-07 RX ORDER — NAPROXEN 250 MG/1
250 TABLET ORAL 2 TIMES DAILY
Qty: 14 TABLET | Refills: 0 | Status: SHIPPED | OUTPATIENT
Start: 2024-10-07 | End: 2024-10-14

## 2024-10-07 RX ORDER — IOPAMIDOL 755 MG/ML
75 INJECTION, SOLUTION INTRAVASCULAR
Status: COMPLETED | OUTPATIENT
Start: 2024-10-07 | End: 2024-10-07

## 2024-10-07 RX ORDER — 0.9 % SODIUM CHLORIDE 0.9 %
1000 INTRAVENOUS SOLUTION INTRAVENOUS ONCE
Status: COMPLETED | OUTPATIENT
Start: 2024-10-07 | End: 2024-10-07

## 2024-10-07 RX ADMIN — ONDANSETRON 4 MG: 2 INJECTION INTRAMUSCULAR; INTRAVENOUS at 08:36

## 2024-10-07 RX ADMIN — SODIUM CHLORIDE 1000 ML: 9 INJECTION, SOLUTION INTRAVENOUS at 08:35

## 2024-10-07 RX ADMIN — WATER 1000 MG: 1 INJECTION INTRAMUSCULAR; INTRAVENOUS; SUBCUTANEOUS at 10:47

## 2024-10-07 RX ADMIN — KETOROLAC TROMETHAMINE 30 MG: 30 INJECTION, SOLUTION INTRAMUSCULAR at 08:35

## 2024-10-07 RX ADMIN — IOPAMIDOL 75 ML: 755 INJECTION, SOLUTION INTRAVENOUS at 09:35

## 2024-10-07 ASSESSMENT — ENCOUNTER SYMPTOMS
EYE REDNESS: 0
NAUSEA: 0
ABDOMINAL PAIN: 1
SHORTNESS OF BREATH: 0
VOMITING: 0

## 2024-10-07 ASSESSMENT — PAIN SCALES - GENERAL: PAINLEVEL_OUTOF10: 7

## 2024-10-07 ASSESSMENT — PAIN DESCRIPTION - LOCATION: LOCATION: ABDOMEN

## 2024-10-07 NOTE — ED PROVIDER NOTES
[facility] EMERGENCY DEPARTMENT  EMERGENCY DEPARTMENT ENCOUNTER        Pt Name: Katie Camara  MRN: [id number]  Birthdate 1980  Date of evaluation: 10/7/2024  Provider: Cyndie Krishnan DO  PCP: Yaw Sesay DO  Note Started: 11:00 AM EDT 10/7/24    CHIEF COMPLAINT       Chief Complaint   Patient presents with    Pelvic Pain     Started Saturday, states \"it feels like I am in labor but I am not, have not had period since 8/7\"       HISTORY OF PRESENT ILLNESS: 1 or more Elements       Katie Camara is a 44 y.o. female who presents to the emergency department the chief complaint of abdominal pain.  The patient states that she began approximately 3 days ago with abdominal pain.  The pain is located suprapubic region.  She does complain of the pain describes a squeezing sensation.  Mild in severity.  Radiates to her bilateral flanks.  Nothing makes it better.  The patient states that she did try Motrin, Robaxin and Zofran at home last night with no improvement.  She denies any chest pain, shortness of breath, dysuria, diarrhea or constipation    Nursing Notes were all reviewed and agreed with or any disagreements were addressed in the HPI.    REVIEW OF SYSTEMS :      Review of Systems   Constitutional:  Negative for chills and fatigue.   HENT:  Negative for congestion.    Eyes:  Negative for redness.   Respiratory:  Negative for shortness of breath.    Cardiovascular:  Negative for chest pain.   Gastrointestinal:  Positive for abdominal pain. Negative for nausea and vomiting.   Genitourinary:  Positive for flank pain. Negative for dysuria.   Musculoskeletal:  Negative for arthralgias.   Skin:  Negative for rash.   Neurological:  Negative for light-headedness.   Psychiatric/Behavioral:  Negative for confusion.    All other systems reviewed and are negative.      Positives and Pertinent negatives as per HPI.     SURGICAL HISTORY     Past Surgical History:

## 2024-10-08 LAB
MICROORGANISM SPEC CULT: ABNORMAL
MICROORGANISM SPEC CULT: ABNORMAL
SERVICE CMNT-IMP: ABNORMAL
SPECIMEN DESCRIPTION: ABNORMAL

## 2024-11-27 ENCOUNTER — HOSPITAL ENCOUNTER (EMERGENCY)
Age: 44
Discharge: HOME | End: 2024-11-28
Attending: EMERGENCY MEDICINE
Payer: MEDICAID

## 2024-11-27 ENCOUNTER — APPOINTMENT (OUTPATIENT)
Dept: GENERAL RADIOLOGY | Age: 44
End: 2024-11-27
Payer: MEDICAID

## 2024-11-27 DIAGNOSIS — U07.1 COVID-19: Primary | ICD-10-CM

## 2024-11-27 LAB
ALBUMIN SERPL-MCNC: 3.7 G/DL (ref 3.5–5.2)
ALP SERPL-CCNC: 68 U/L (ref 35–104)
ALT SERPL-CCNC: 16 U/L (ref 0–32)
ANION GAP SERPL CALCULATED.3IONS-SCNC: 16 MMOL/L (ref 7–16)
AST SERPL-CCNC: 23 U/L (ref 0–31)
BASOPHILS # BLD: 0.03 K/UL (ref 0–0.2)
BASOPHILS NFR BLD: 0 % (ref 0–2)
BILIRUB SERPL-MCNC: 0.6 MG/DL (ref 0–1.2)
BNP SERPL-MCNC: <36 PG/ML (ref 0–125)
BUN SERPL-MCNC: 9 MG/DL (ref 6–20)
CALCIUM SERPL-MCNC: 8.9 MG/DL (ref 8.6–10.2)
CHLORIDE SERPL-SCNC: 100 MMOL/L (ref 98–107)
CO2 SERPL-SCNC: 22 MMOL/L (ref 22–29)
CREAT SERPL-MCNC: 0.6 MG/DL (ref 0.5–1)
D-DIMER QUANTITATIVE: <200 NG/ML DDU (ref 0–230)
EOSINOPHIL # BLD: 0.02 K/UL (ref 0.05–0.5)
EOSINOPHILS RELATIVE PERCENT: 0 % (ref 0–6)
ERYTHROCYTE [DISTWIDTH] IN BLOOD BY AUTOMATED COUNT: 14.3 % (ref 11.5–15)
GFR, ESTIMATED: >90 ML/MIN/1.73M2
GLUCOSE SERPL-MCNC: 99 MG/DL (ref 74–99)
HCT VFR BLD AUTO: 48.5 % (ref 34–48)
HGB BLD-MCNC: 16.4 G/DL (ref 11.5–15.5)
IMM GRANULOCYTES # BLD AUTO: 0.04 K/UL (ref 0–0.58)
IMM GRANULOCYTES NFR BLD: 0 % (ref 0–5)
LACTATE BLDV-SCNC: 3.1 MMOL/L (ref 0.5–2.2)
LACTATE BLDV-SCNC: 3.2 MMOL/L (ref 0.5–2.2)
LYMPHOCYTES NFR BLD: 1.51 K/UL (ref 1.5–4)
LYMPHOCYTES RELATIVE PERCENT: 15 % (ref 20–42)
MAGNESIUM SERPL-MCNC: 1.8 MG/DL (ref 1.6–2.6)
MCH RBC QN AUTO: 34.3 PG (ref 26–35)
MCHC RBC AUTO-ENTMCNC: 33.8 G/DL (ref 32–34.5)
MCV RBC AUTO: 101.5 FL (ref 80–99.9)
MONOCYTES NFR BLD: 0.63 K/UL (ref 0.1–0.95)
MONOCYTES NFR BLD: 6 % (ref 2–12)
NEUTROPHILS NFR BLD: 78 % (ref 43–80)
NEUTS SEG NFR BLD: 8.07 K/UL (ref 1.8–7.3)
PLATELET # BLD AUTO: 164 K/UL (ref 130–450)
PMV BLD AUTO: 10 FL (ref 7–12)
POTASSIUM SERPL-SCNC: 3.1 MMOL/L (ref 3.5–5)
PROT SERPL-MCNC: 6.2 G/DL (ref 6.4–8.3)
RBC # BLD AUTO: 4.78 M/UL (ref 3.5–5.5)
SODIUM SERPL-SCNC: 138 MMOL/L (ref 132–146)
TROPONIN I SERPL HS-MCNC: <6 NG/L (ref 0–9)
WBC OTHER # BLD: 10.3 K/UL (ref 4.5–11.5)

## 2024-11-27 PROCEDURE — 6360000002 HC RX W HCPCS: Performed by: EMERGENCY MEDICINE

## 2024-11-27 PROCEDURE — 2580000003 HC RX 258: Performed by: EMERGENCY MEDICINE

## 2024-11-27 PROCEDURE — 94640 AIRWAY INHALATION TREATMENT: CPT

## 2024-11-27 PROCEDURE — 85379 FIBRIN DEGRADATION QUANT: CPT

## 2024-11-27 PROCEDURE — 6370000000 HC RX 637 (ALT 250 FOR IP): Performed by: EMERGENCY MEDICINE

## 2024-11-27 PROCEDURE — 99285 EMERGENCY DEPT VISIT HI MDM: CPT

## 2024-11-27 PROCEDURE — 87040 BLOOD CULTURE FOR BACTERIA: CPT

## 2024-11-27 PROCEDURE — 93005 ELECTROCARDIOGRAM TRACING: CPT | Performed by: EMERGENCY MEDICINE

## 2024-11-27 PROCEDURE — 83735 ASSAY OF MAGNESIUM: CPT

## 2024-11-27 PROCEDURE — 80053 COMPREHEN METABOLIC PANEL: CPT

## 2024-11-27 PROCEDURE — 84484 ASSAY OF TROPONIN QUANT: CPT

## 2024-11-27 PROCEDURE — 96361 HYDRATE IV INFUSION ADD-ON: CPT

## 2024-11-27 PROCEDURE — 71045 X-RAY EXAM CHEST 1 VIEW: CPT

## 2024-11-27 PROCEDURE — 83605 ASSAY OF LACTIC ACID: CPT

## 2024-11-27 PROCEDURE — 85025 COMPLETE CBC W/AUTO DIFF WBC: CPT

## 2024-11-27 PROCEDURE — 96374 THER/PROPH/DIAG INJ IV PUSH: CPT

## 2024-11-27 PROCEDURE — 83880 ASSAY OF NATRIURETIC PEPTIDE: CPT

## 2024-11-27 RX ORDER — IPRATROPIUM BROMIDE AND ALBUTEROL SULFATE 2.5; .5 MG/3ML; MG/3ML
2 SOLUTION RESPIRATORY (INHALATION) ONCE
Status: COMPLETED | OUTPATIENT
Start: 2024-11-27 | End: 2024-11-27

## 2024-11-27 RX ORDER — DEXAMETHASONE SODIUM PHOSPHATE 10 MG/ML
6 INJECTION INTRAMUSCULAR; INTRAVENOUS ONCE
Status: COMPLETED | OUTPATIENT
Start: 2024-11-27 | End: 2024-11-27

## 2024-11-27 RX ORDER — 0.9 % SODIUM CHLORIDE 0.9 %
1000 INTRAVENOUS SOLUTION INTRAVENOUS ONCE
Status: COMPLETED | OUTPATIENT
Start: 2024-11-27 | End: 2024-11-28

## 2024-11-27 RX ADMIN — SODIUM CHLORIDE 1000 ML: 9 INJECTION, SOLUTION INTRAVENOUS at 21:17

## 2024-11-27 RX ADMIN — DEXAMETHASONE SODIUM PHOSPHATE 6 MG: 10 INJECTION INTRAMUSCULAR; INTRAVENOUS at 20:05

## 2024-11-27 RX ADMIN — IPRATROPIUM BROMIDE AND ALBUTEROL SULFATE 2 DOSE: .5; 2.5 SOLUTION RESPIRATORY (INHALATION) at 20:45

## 2024-11-28 VITALS
TEMPERATURE: 97.8 F | WEIGHT: 246 LBS | DIASTOLIC BLOOD PRESSURE: 80 MMHG | RESPIRATION RATE: 18 BRPM | SYSTOLIC BLOOD PRESSURE: 128 MMHG | OXYGEN SATURATION: 100 % | BODY MASS INDEX: 37.28 KG/M2 | HEART RATE: 72 BPM | HEIGHT: 68 IN

## 2024-11-28 LAB — LACTATE BLDV-SCNC: 2.3 MMOL/L (ref 0.5–2.2)

## 2024-11-28 PROCEDURE — 83605 ASSAY OF LACTIC ACID: CPT

## 2024-11-28 PROCEDURE — 96361 HYDRATE IV INFUSION ADD-ON: CPT

## 2024-11-28 PROCEDURE — 99285 EMERGENCY DEPT VISIT HI MDM: CPT

## 2024-11-28 PROCEDURE — 2580000003 HC RX 258: Performed by: STUDENT IN AN ORGANIZED HEALTH CARE EDUCATION/TRAINING PROGRAM

## 2024-11-28 RX ORDER — 0.9 % SODIUM CHLORIDE 0.9 %
1000 INTRAVENOUS SOLUTION INTRAVENOUS ONCE
Status: COMPLETED | OUTPATIENT
Start: 2024-11-28 | End: 2024-11-28

## 2024-11-28 RX ADMIN — SODIUM CHLORIDE 1000 ML: 9 INJECTION, SOLUTION INTRAVENOUS at 01:01

## 2024-11-28 ASSESSMENT — PAIN - FUNCTIONAL ASSESSMENT: PAIN_FUNCTIONAL_ASSESSMENT: NONE - DENIES PAIN

## 2024-11-28 ASSESSMENT — LIFESTYLE VARIABLES
HOW MANY STANDARD DRINKS CONTAINING ALCOHOL DO YOU HAVE ON A TYPICAL DAY: 3 OR 4
HOW OFTEN DO YOU HAVE A DRINK CONTAINING ALCOHOL: 2-3 TIMES A WEEK

## 2024-11-28 NOTE — DISCHARGE INSTRUCTIONS
Increase fluids  Follow-up with primary care doctor  Continue all medication as previously prescribed  If you notice any new worrisome symptoms please return to emergency department for evaluation

## 2024-11-28 NOTE — ED PROVIDER NOTES
[facility] EMERGENCY DEPARTMENT  EMERGENCY DEPARTMENT ENCOUNTER        Pt Name: Katie Camara  MRN: [id number]  Birthdate 1980  Date of evaluation: 11/27/2024  Provider: Marino Dahl DO  PCP: Yaw Sesay DO  Note Started: 7:27 PM EST 11/27/24    CHIEF COMPLAINT       Chief Complaint   Patient presents with    Illness     Pt states home positive covid test, pt states she went to urgent care and was given inhalers&steroids. Pt states she is Sob with exertion         HISTORY OF PRESENT ILLNESS: 1 or more Elements   History From: Patient     Limitations to history : None    Katie Camara is a 44 y.o. female who presents shortness of breath chest tightness.  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  She is asymptomatic at that time, she tested positive as it was prior to require testing.  She reports over the weekend she developed chest tightness wheezing some shortness of breath.  Went to urgent care.  Was started on albuterol inhaler as well as prednisone and azithromycin.  Last took guaifenesin and Tylenol earlier this morning.  She complains of chest tightness wheezing, stopped smoking a few days ago due to symptoms.  Denies hemoptysis nausea vomiting.  Called EMS came in for shortness of breath.  Does endorse shortness of breath on exertion    Nursing Notes were all reviewed and agreed with or any disagreements were addressed in the HPI.      REVIEW OF EXTERNAL NOTE :       OB/GYN office note from 11/21/2024 was seen for annual follow-up    REVIEW OF SYSTEMS :           Positives and Pertinent negatives as per HPI.     SURGICAL HISTORY     Past Surgical History:   Procedure Laterality Date    BREAST LUMPECTOMY Right 1995    Benign    COLONOSCOPY  11/02/2011    Sayed Yossef-normal    PILONIDAL CYST EXCISION  2000    [facility]    TONSILLECTOMY  1991    TRANSESOPHAGEAL ECHOCARDIOGRAM  2004    showed  troponin low suspicion for PE.  Decision made to discharge patient.  Patient instructed increase fluids given note for work strict return precautions discussed all questions answered patient agreement plan of care discharged in stable condition. [BP]      ED Course User Index  [BP] Kit Lund, DO        Medical Decision Making  History From: Patient     Limitations to history : None    Katie Camara is a 44 y.o. female who presents shortness of breath chest tightness.  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  She is asymptomatic at that time, she tested positive as it was prior to require testing.  She reports over the weekend she developed chest tightness wheezing some shortness of breath.  Went to urgent care.  Was started on albuterol inhaler as well as prednisone and azithromycin.  Last took guaifenesin and Tylenol earlier this morning.  She complains of chest tightness wheezing, stopped smoking a few days ago due to symptoms.  Denies hemoptysis nausea vomiting.  Called EMS came in for shortness of breath.  Does endorse shortness of breath on exertion    Nursing Notes were all reviewed and agreed with or any disagreements were addressed in the HPI.      REVIEW OF EXTERNAL NOTE :      OB/GYN office note from 11/21/2024 was seen for annual follow-up    The patient was placed on the cardiac monitor for continuous monitoring of rhythm and vitals. EKG ordered to evaluate patient's current cardiac rate, rhythm, and QT interval. CBC was ordered as part of my assessment for possible infection, anemia or thrombocytopenia. CMP to assess electrolytes, kidney function, liver function or any metabolic derangements. Lactic acid as a marker of hypoperfusion or ischemia. Troponin as a marker for myocardial ischemia or heart strain. BNP to evaluate for heart failure and/or as a marker for heart strain. Chest x-ray for any possible signs of, but without

## 2024-11-29 LAB
EKG ATRIAL RATE: 76 BPM
EKG P AXIS: -13 DEGREES
EKG P-R INTERVAL: 162 MS
EKG Q-T INTERVAL: 404 MS
EKG QRS DURATION: 74 MS
EKG QTC CALCULATION (BAZETT): 454 MS
EKG R AXIS: 7 DEGREES
EKG T AXIS: 44 DEGREES
EKG VENTRICULAR RATE: 76 BPM

## 2024-11-29 PROCEDURE — 93010 ELECTROCARDIOGRAM REPORT: CPT | Performed by: INTERNAL MEDICINE

## 2024-12-01 LAB
MICROORGANISM SPEC CULT: NORMAL
MICROORGANISM SPEC CULT: NORMAL
SERVICE CMNT-IMP: NORMAL
SERVICE CMNT-IMP: NORMAL
SPECIMEN DESCRIPTION: NORMAL
SPECIMEN DESCRIPTION: NORMAL

## 2025-01-28 ENCOUNTER — APPOINTMENT (OUTPATIENT)
Dept: GENERAL RADIOLOGY | Age: 45
DRG: 422 | End: 2025-01-28
Payer: MEDICAID

## 2025-01-28 ENCOUNTER — HOSPITAL ENCOUNTER (INPATIENT)
Age: 45
LOS: 1 days | Discharge: LEFT BEFORE DISCHARGE | DRG: 422 | End: 2025-01-29
Attending: EMERGENCY MEDICINE | Admitting: INTERNAL MEDICINE
Payer: MEDICAID

## 2025-01-28 ENCOUNTER — APPOINTMENT (OUTPATIENT)
Dept: CT IMAGING | Age: 45
DRG: 422 | End: 2025-01-28
Payer: MEDICAID

## 2025-01-28 DIAGNOSIS — S09.90XA CLOSED HEAD INJURY, INITIAL ENCOUNTER: ICD-10-CM
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
## 2025-01-28 DIAGNOSIS — E86.0 DEHYDRATION: ICD-10-CM

## 2025-01-28 DIAGNOSIS — M79.605 BILATERAL LOWER EXTREMITY PAIN: ICD-10-CM

## 2025-01-28 DIAGNOSIS — M79.604 BILATERAL LOWER EXTREMITY PAIN: ICD-10-CM

## 2025-01-28 DIAGNOSIS — E87.20 LACTIC ACIDOSIS: Primary | ICD-10-CM

## 2025-01-28 DIAGNOSIS — F10.920 ALCOHOLIC INTOXICATION WITHOUT COMPLICATION (HCC): ICD-10-CM

## 2025-01-28 LAB
ALBUMIN SERPL-MCNC: 4.4 G/DL (ref 3.5–5.2)
ALP SERPL-CCNC: 78 U/L (ref 35–104)
ALT SERPL-CCNC: 52 U/L (ref 0–32)
ANION GAP SERPL CALCULATED.3IONS-SCNC: 23 MMOL/L (ref 7–16)
AST SERPL-CCNC: 68 U/L (ref 0–31)
BASOPHILS # BLD: 0.04 K/UL (ref 0–0.2)
BASOPHILS NFR BLD: 0 % (ref 0–2)
BILIRUB SERPL-MCNC: 0.8 MG/DL (ref 0–1.2)
BILIRUB UR QL STRIP: NEGATIVE
BUN SERPL-MCNC: 6 MG/DL (ref 6–20)
CALCIUM SERPL-MCNC: 8.7 MG/DL (ref 8.6–10.2)
CHLORIDE SERPL-SCNC: 104 MMOL/L (ref 98–107)
CK SERPL-CCNC: 110 U/L (ref 20–180)
CLARITY UR: CLEAR
CO2 SERPL-SCNC: 18 MMOL/L (ref 22–29)
COLOR UR: YELLOW
CREAT SERPL-MCNC: 0.7 MG/DL (ref 0.5–1)
EOSINOPHIL # BLD: 0.02 K/UL (ref 0.05–0.5)
EOSINOPHILS RELATIVE PERCENT: 0 % (ref 0–6)
ERYTHROCYTE [DISTWIDTH] IN BLOOD BY AUTOMATED COUNT: 15.6 % (ref 11.5–15)
GFR, ESTIMATED: >90 ML/MIN/1.73M2
GLUCOSE SERPL-MCNC: 73 MG/DL (ref 74–99)
GLUCOSE UR STRIP-MCNC: NEGATIVE MG/DL
HCG SERPL QL: NEGATIVE
HCT VFR BLD AUTO: 52.6 % (ref 34–48)
HGB BLD-MCNC: 18.2 G/DL (ref 11.5–15.5)
HGB UR QL STRIP.AUTO: ABNORMAL
IMM GRANULOCYTES # BLD AUTO: 0.03 K/UL (ref 0–0.58)
IMM GRANULOCYTES NFR BLD: 0 % (ref 0–5)
KETONES UR STRIP-MCNC: >80 MG/DL
LACTATE BLDV-SCNC: 6.1 MMOL/L (ref 0.5–2.2)
LACTATE BLDV-SCNC: 7.2 MMOL/L (ref 0.5–2.2)
LEUKOCYTE ESTERASE UR QL STRIP: NEGATIVE
LYMPHOCYTES NFR BLD: 2.25 K/UL (ref 1.5–4)
LYMPHOCYTES RELATIVE PERCENT: 22 % (ref 20–42)
MAGNESIUM SERPL-MCNC: 2.3 MG/DL (ref 1.6–2.6)
MCH RBC QN AUTO: 35.7 PG (ref 26–35)
MCHC RBC AUTO-ENTMCNC: 34.6 G/DL (ref 32–34.5)
MCV RBC AUTO: 103.1 FL (ref 80–99.9)
MONOCYTES NFR BLD: 0.44 K/UL (ref 0.1–0.95)
MONOCYTES NFR BLD: 4 % (ref 2–12)
NEUTROPHILS NFR BLD: 72 % (ref 43–80)
NEUTS SEG NFR BLD: 7.25 K/UL (ref 1.8–7.3)
NITRITE UR QL STRIP: NEGATIVE
PH UR STRIP: 6 [PH] (ref 5–9)
PLATELET # BLD AUTO: 196 K/UL (ref 130–450)
PMV BLD AUTO: 9.3 FL (ref 7–12)
POTASSIUM SERPL-SCNC: 4 MMOL/L (ref 3.5–5)
PROT SERPL-MCNC: 8.1 G/DL (ref 6.4–8.3)
PROT UR STRIP-MCNC: ABNORMAL MG/DL
RBC # BLD AUTO: 5.1 M/UL (ref 3.5–5.5)
RBC #/AREA URNS HPF: ABNORMAL /HPF
SODIUM SERPL-SCNC: 145 MMOL/L (ref 132–146)
SP GR UR STRIP: >1.03 (ref 1–1.03)
TROPONIN I SERPL HS-MCNC: <6 NG/L (ref 0–9)
UROBILINOGEN UR STRIP-ACNC: 0.2 EU/DL (ref 0–1)
WBC #/AREA URNS HPF: ABNORMAL /HPF
WBC OTHER # BLD: 10 K/UL (ref 4.5–11.5)

## 2025-01-28 PROCEDURE — 72125 CT NECK SPINE W/O DYE: CPT

## 2025-01-28 PROCEDURE — 84484 ASSAY OF TROPONIN QUANT: CPT

## 2025-01-28 PROCEDURE — 81001 URINALYSIS AUTO W/SCOPE: CPT

## 2025-01-28 PROCEDURE — 82550 ASSAY OF CK (CPK): CPT

## 2025-01-28 PROCEDURE — 96361 HYDRATE IV INFUSION ADD-ON: CPT

## 2025-01-28 PROCEDURE — 84703 CHORIONIC GONADOTROPIN ASSAY: CPT

## 2025-01-28 PROCEDURE — 73562 X-RAY EXAM OF KNEE 3: CPT

## 2025-01-28 PROCEDURE — 80053 COMPREHEN METABOLIC PANEL: CPT

## 2025-01-28 PROCEDURE — 85025 COMPLETE CBC W/AUTO DIFF WBC: CPT

## 2025-01-28 PROCEDURE — 83605 ASSAY OF LACTIC ACID: CPT

## 2025-01-28 PROCEDURE — 83735 ASSAY OF MAGNESIUM: CPT

## 2025-01-28 PROCEDURE — 73552 X-RAY EXAM OF FEMUR 2/>: CPT

## 2025-01-28 PROCEDURE — 2580000003 HC RX 258: Performed by: EMERGENCY MEDICINE

## 2025-01-28 PROCEDURE — 70450 CT HEAD/BRAIN W/O DYE: CPT

## 2025-01-28 PROCEDURE — 6360000002 HC RX W HCPCS: Performed by: EMERGENCY MEDICINE

## 2025-01-28 PROCEDURE — 6370000000 HC RX 637 (ALT 250 FOR IP): Performed by: EMERGENCY MEDICINE

## 2025-01-28 PROCEDURE — 99285 EMERGENCY DEPT VISIT HI MDM: CPT

## 2025-01-28 PROCEDURE — G0480 DRUG TEST DEF 1-7 CLASSES: HCPCS

## 2025-01-28 PROCEDURE — 96374 THER/PROPH/DIAG INJ IV PUSH: CPT

## 2025-01-28 PROCEDURE — 93005 ELECTROCARDIOGRAM TRACING: CPT | Performed by: EMERGENCY MEDICINE

## 2025-01-28 RX ORDER — SODIUM CHLORIDE 9 MG/ML
INJECTION, SOLUTION INTRAVENOUS ONCE
Status: COMPLETED | OUTPATIENT
Start: 2025-01-28 | End: 2025-01-29

## 2025-01-28 RX ORDER — SODIUM CHLORIDE 9 MG/ML
INJECTION, SOLUTION INTRAVENOUS ONCE
Status: COMPLETED | OUTPATIENT
Start: 2025-01-28 | End: 2025-01-28

## 2025-01-28 RX ORDER — 0.9 % SODIUM CHLORIDE 0.9 %
1000 INTRAVENOUS SOLUTION INTRAVENOUS ONCE
Status: COMPLETED | OUTPATIENT
Start: 2025-01-28 | End: 2025-01-29

## 2025-01-28 RX ORDER — THIAMINE HYDROCHLORIDE 100 MG/ML
100 INJECTION, SOLUTION INTRAMUSCULAR; INTRAVENOUS ONCE
Status: COMPLETED | OUTPATIENT
Start: 2025-01-28 | End: 2025-01-28

## 2025-01-28 RX ORDER — METOPROLOL TARTRATE 50 MG
100 TABLET ORAL ONCE
Status: COMPLETED | OUTPATIENT
Start: 2025-01-28 | End: 2025-01-28

## 2025-01-28 RX ORDER — SODIUM CHLORIDE 9 MG/ML
INJECTION, SOLUTION INTRAVENOUS ONCE
Status: DISCONTINUED | OUTPATIENT
Start: 2025-01-28 | End: 2025-01-29 | Stop reason: HOSPADM

## 2025-01-28 RX ADMIN — THIAMINE HYDROCHLORIDE 100 MG: 100 INJECTION, SOLUTION INTRAMUSCULAR; INTRAVENOUS at 19:56

## 2025-01-28 RX ADMIN — SODIUM CHLORIDE: 9 INJECTION, SOLUTION INTRAVENOUS at 19:58

## 2025-01-28 RX ADMIN — SODIUM CHLORIDE: 9 INJECTION, SOLUTION INTRAVENOUS at 23:24

## 2025-01-28 RX ADMIN — METOPROLOL TARTRATE 100 MG: 50 TABLET, FILM COATED ORAL at 23:11

## 2025-01-28 RX ADMIN — SODIUM CHLORIDE 1000 ML: 9 INJECTION, SOLUTION INTRAVENOUS at 23:23

## 2025-01-28 RX ADMIN — SODIUM CHLORIDE: 9 INJECTION, SOLUTION INTRAVENOUS at 20:48

## 2025-01-28 NOTE — ED PROVIDER NOTES
[facility]  EMERGENCY DEPARTMENT ENCOUNTER        Pt Name: Katie Camara  MRN: [id number]  Birthdate 1980  Date of evaluation: 1/28/2025  Provider: Scout Dobbins DO  PCP: Yaw Sesay DO  Note Started: 4:43 PM EST 1/28/25    CHIEF COMPLAINT       Chief Complaint   Patient presents with    Fall     Pt fell down steps 2 days ago and hurt leg.  She reports hit her head and today she was having a lot of left leg pain       HISTORY OF PRESENT ILLNESS: 1 or more Elements     History from : Patient    Limitations to history : None    Katie Camara is a 44 y.o. female who presents via ems after fall two days ago, she states she fells down steps and is complaining of pain to b/l anterior shins. She notes she wanted to come to the ED sooner but her son would not let her. She has been drinking liquour every day she states to cope w the pain and had hard liquour earlier. She does not want me to put this in her chart she states. She deneis any so or hi. She is concerned her drinking etoh will prevent her from having weight loss surgery,. She is on semiglutide. She notes she may have hit her head    Nursing Notes were all reviewed and agreed with or any disagreements were addressed in the HPI.    REVIEW OF SYSTEMS :      Review of Systems   Gastrointestinal:  Positive for nausea.       Positives and Pertinent negatives as per HPI.     SURGICAL HISTORY     Past Surgical History:   Procedure Laterality Date    BREAST LUMPECTOMY Right 1995    Benign    COLONOSCOPY  11/02/2011    Sayed Yossef-normal    PILONIDAL CYST EXCISION  2000    [facility]     TONSILLECTOMY  1991    TRANSESOPHAGEAL ECHOCARDIOGRAM  2004    showed MVP    UPPER GASTROINTESTINAL ENDOSCOPY N/A 12/28/2018    EGD BIOPSY performed by Scout Morris MD at [facility]       CURRENTMEDICATIONS       Current Discharge Medication List        CONTINUE these medications which have NOT CHANGED    Details   pantoprazole (PROTONIX) 40 MG

## 2025-01-29 ENCOUNTER — APPOINTMENT (OUTPATIENT)
Dept: CT IMAGING | Age: 45
DRG: 422 | End: 2025-01-29
Payer: MEDICAID

## 2025-01-29 VITALS
WEIGHT: 245.7 LBS | SYSTOLIC BLOOD PRESSURE: 137 MMHG | BODY MASS INDEX: 37.36 KG/M2 | RESPIRATION RATE: 18 BRPM | OXYGEN SATURATION: 99 % | HEART RATE: 73 BPM | DIASTOLIC BLOOD PRESSURE: 90 MMHG | TEMPERATURE: 97.8 F

## 2025-01-29 LAB
ETHANOLAMINE SERPL-MCNC: 229 MG/DL (ref 0–0.08)
GLUCOSE BLD-MCNC: 86 MG/DL (ref 74–99)
LACTATE BLDV-SCNC: 2.3 MMOL/L (ref 0.5–2.2)
LACTATE BLDV-SCNC: 3.4 MMOL/L (ref 0.5–2.2)

## 2025-01-29 PROCEDURE — 83605 ASSAY OF LACTIC ACID: CPT

## 2025-01-29 PROCEDURE — 74177 CT ABD & PELVIS W/CONTRAST: CPT

## 2025-01-29 PROCEDURE — 2500000003 HC RX 250 WO HCPCS: Performed by: NURSE PRACTITIONER

## 2025-01-29 PROCEDURE — G0378 HOSPITAL OBSERVATION PER HR: HCPCS

## 2025-01-29 PROCEDURE — 6360000004 HC RX CONTRAST MEDICATION: Performed by: RADIOLOGY

## 2025-01-29 PROCEDURE — 82962 GLUCOSE BLOOD TEST: CPT

## 2025-01-29 PROCEDURE — 2580000003 HC RX 258: Performed by: NURSE PRACTITIONER

## 2025-01-29 PROCEDURE — 6370000000 HC RX 637 (ALT 250 FOR IP): Performed by: NURSE PRACTITIONER

## 2025-01-29 PROCEDURE — 2060000000 HC ICU INTERMEDIATE R&B

## 2025-01-29 RX ORDER — SODIUM CHLORIDE 9 MG/ML
INJECTION, SOLUTION INTRAVENOUS CONTINUOUS
Status: DISCONTINUED | OUTPATIENT
Start: 2025-01-29 | End: 2025-01-29 | Stop reason: HOSPADM

## 2025-01-29 RX ORDER — POTASSIUM CHLORIDE 1500 MG/1
40 TABLET, EXTENDED RELEASE ORAL PRN
Status: DISCONTINUED | OUTPATIENT
Start: 2025-01-29 | End: 2025-01-29 | Stop reason: HOSPADM

## 2025-01-29 RX ORDER — SODIUM CHLORIDE 0.9 % (FLUSH) 0.9 %
5-40 SYRINGE (ML) INJECTION EVERY 12 HOURS SCHEDULED
Status: DISCONTINUED | OUTPATIENT
Start: 2025-01-29 | End: 2025-01-29 | Stop reason: HOSPADM

## 2025-01-29 RX ORDER — PROCHLORPERAZINE EDISYLATE 5 MG/ML
5 INJECTION INTRAMUSCULAR; INTRAVENOUS EVERY 6 HOURS PRN
Status: DISCONTINUED | OUTPATIENT
Start: 2025-01-29 | End: 2025-01-29 | Stop reason: HOSPADM

## 2025-01-29 RX ORDER — FLUTICASONE PROPIONATE 50 MCG
2 SPRAY, SUSPENSION (ML) NASAL DAILY
Status: DISCONTINUED | OUTPATIENT
Start: 2025-01-29 | End: 2025-01-29 | Stop reason: HOSPADM

## 2025-01-29 RX ORDER — SODIUM CHLORIDE 9 MG/ML
INJECTION, SOLUTION INTRAVENOUS PRN
Status: DISCONTINUED | OUTPATIENT
Start: 2025-01-29 | End: 2025-01-29 | Stop reason: HOSPADM

## 2025-01-29 RX ORDER — MAGNESIUM SULFATE IN WATER 40 MG/ML
2000 INJECTION, SOLUTION INTRAVENOUS PRN
Status: DISCONTINUED | OUTPATIENT
Start: 2025-01-29 | End: 2025-01-29 | Stop reason: HOSPADM

## 2025-01-29 RX ORDER — SODIUM CHLORIDE 0.9 % (FLUSH) 0.9 %
5-40 SYRINGE (ML) INJECTION PRN
Status: DISCONTINUED | OUTPATIENT
Start: 2025-01-29 | End: 2025-01-29 | Stop reason: HOSPADM

## 2025-01-29 RX ORDER — POTASSIUM CHLORIDE 7.45 MG/ML
10 INJECTION INTRAVENOUS PRN
Status: DISCONTINUED | OUTPATIENT
Start: 2025-01-29 | End: 2025-01-29 | Stop reason: HOSPADM

## 2025-01-29 RX ORDER — POLYETHYLENE GLYCOL 3350 17 G/17G
17 POWDER, FOR SOLUTION ORAL DAILY PRN
Status: DISCONTINUED | OUTPATIENT
Start: 2025-01-29 | End: 2025-01-29 | Stop reason: HOSPADM

## 2025-01-29 RX ORDER — PANTOPRAZOLE SODIUM 40 MG/1
40 TABLET, DELAYED RELEASE ORAL
Status: DISCONTINUED | OUTPATIENT
Start: 2025-01-29 | End: 2025-01-29 | Stop reason: HOSPADM

## 2025-01-29 RX ORDER — IOPAMIDOL 755 MG/ML
75 INJECTION, SOLUTION INTRAVASCULAR
Status: COMPLETED | OUTPATIENT
Start: 2025-01-29 | End: 2025-01-29

## 2025-01-29 RX ORDER — MULTIVITAMIN WITH IRON
1 TABLET ORAL DAILY
Status: DISCONTINUED | OUTPATIENT
Start: 2025-01-29 | End: 2025-01-29 | Stop reason: HOSPADM

## 2025-01-29 RX ORDER — LANOLIN ALCOHOL/MO/W.PET/CERES
100 CREAM (GRAM) TOPICAL DAILY
Status: DISCONTINUED | OUTPATIENT
Start: 2025-01-29 | End: 2025-01-29 | Stop reason: HOSPADM

## 2025-01-29 RX ORDER — FOLIC ACID 1 MG/1
1 TABLET ORAL DAILY
Status: DISCONTINUED | OUTPATIENT
Start: 2025-01-29 | End: 2025-01-29 | Stop reason: HOSPADM

## 2025-01-29 RX ADMIN — MULTIVITAMIN TABLET 1 TABLET: TABLET at 09:07

## 2025-01-29 RX ADMIN — Medication 100 MG: at 09:07

## 2025-01-29 RX ADMIN — SODIUM CHLORIDE: 9 INJECTION, SOLUTION INTRAVENOUS at 09:07

## 2025-01-29 RX ADMIN — FLUTICASONE PROPIONATE 2 SPRAY: 50 SPRAY, METERED NASAL at 09:08

## 2025-01-29 RX ADMIN — PANTOPRAZOLE SODIUM 40 MG: 40 TABLET, DELAYED RELEASE ORAL at 09:08

## 2025-01-29 RX ADMIN — IOPAMIDOL 75 ML: 755 INJECTION, SOLUTION INTRAVENOUS at 03:04

## 2025-01-29 RX ADMIN — FOLIC ACID 1 MG: 1 TABLET ORAL at 09:08

## 2025-01-29 RX ADMIN — SODIUM CHLORIDE, PRESERVATIVE FREE 10 ML: 5 INJECTION INTRAVENOUS at 09:08

## 2025-01-29 ASSESSMENT — ENCOUNTER SYMPTOMS: NAUSEA: 1

## 2025-01-29 NOTE — ED NOTES
Patient ambulated to the restroom.  She gave urine sample.  She is now back in bed, on her phone.  Labs drawn, without third liter.

## 2025-01-29 NOTE — CARE COORDINATION
Introduced my self and provided explanation of CM role to patient. Admitted for lactic acidosis. LAC 6.1. PMHx of alcohol abuse. Patient declined [facility], patient states she has resources to get sober and has gotten sober on her own in the past but she just likes to being drunk sometimes. She voices she resides in a 2 story home with her son and completes her adl's with independence. Patient is established with Dr. Sesay. Patient denies any home needs at this time, will continue to follow.  Chayito PEREIRAN, RN  Case Management

## 2025-01-29 NOTE — ED NOTES
Report received from MARIELA Myers/  Patient is currently in CT scan, unable to do bedside.  Care assumed.

## 2025-01-29 NOTE — PROGRESS NOTES
While rounding this morning patient expressing she is anxious and may leave. I did inform her that should would need to sign out against medical advice since she is not discharged and encouraged her to stay. She said, \"I will wait and stay until I can't anymore\"    Patient said she takes benzo's at home and did take some this morning. Told her she is not permitted to do that and all medications need to come from us after verified from pharmacy. Verbalized understanding. RN updated and made aware.  Electronically signed by Arlene Mendoza RN on 1/29/2025 at 11:47 AM

## 2025-01-29 NOTE — PROGRESS NOTES
L/M with Dr. Sheik sheriff Metoprolol and Xanax.    Electronically signed by Edel Soto RN on 1/29/2025 at 11:58 AM
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
## 2025-01-29 NOTE — ED NOTES
ED to Inpatient Handoff Report    Notified Azra that electronic handoff available and patient ready for transport to room 604 .    Safety Risks: None identified    Patient in Restraints: no    Constant Observer or Patient : no    Telemetry Monitoring Ordered :No           Order to transfer to unit without monitor:N/A    Last MEWS:   Time completed: 555    Deterioration Index Score:   Predictive Model Details          18 (Normal)  Factor Value    Calculated 1/29/2025 05:57 49% Age 44 years old    Deterioration Index Model 32% Sodium 145 mmol/L     8% WBC count 10.0 k/uL     6% Systolic 123     3% Hematocrit abnormal (52.6 %)     1% Pulse oximetry 98 %     1% Potassium 4.0 mmol/L     0% Pulse 76     0% Respiratory rate 16     0% Temperature 98.3 °F (36.8 °C)        Vitals:    01/28/25 2031 01/28/25 2311 01/28/25 2357 01/29/25 0555   BP: 115/80 (!) 140/100 (!) 125/99 123/82   Pulse: 95 97 77 76   Resp: 15  18 16   Temp:    98.3 °F (36.8 °C)   TempSrc:    Oral   SpO2:    98%         Opportunity for questions and clarification was provided.

## 2025-01-29 NOTE — ACP (ADVANCE CARE PLANNING)
Advance Care Planning   Healthcare Decision Maker:    Primary Decision Maker: Luis M Adames - Brother/Sister - [phone]    Click here to complete Healthcare Decision Makers including selection of the Healthcare Decision Maker Relationship (ie \"Primary\").  Today we documented Decision Maker(s) consistent with Legal Next of Kin hierarchy.

## 2025-01-29 NOTE — H&P
250 MG tablet, Take 1 tablet by mouth 2 times daily for 7 days (Patient not taking: Reported on 1/29/2025)  dicyclomine (BENTYL) 20 MG tablet, Take 1 tablet by mouth every 6 hours (Patient not taking: Reported on 10/17/2024)  ibuprofen (ADVIL;MOTRIN) 800 MG tablet, Take 1 tablet by mouth 2 times daily as needed for Pain (Patient not taking: Reported on 1/29/2025)  clobetasol (TEMOVATE) 0.05 % ointment, Apply bid for 2 weeks then qd (Patient not taking: Reported on 11/21/2024)  acetaminophen (TYLENOL) 500 MG tablet, Take 2 tablets by mouth 3 times daily for 7 days (Patient not taking: Reported on 1/29/2025)  metoprolol tartrate 75 MG TABS, TAKE 1 TABLET BY MOUTH TWICE DAILY (Patient taking differently: 100 mg TAKE 1 TABLET BY MOUTH TWICE DAILY)  NONFORMULARY, Take by mouth every 30 days Oil of Oregano  eletriptan (RELPAX) 40 MG tablet, Take 1 tablet by mouth once as needed (migraine) may repeat in 2 hours if necessary.    Note that the patient's home medications were reviewed and the above list is accurate to the best of my knowledge at the time of the exam.    Allergies:    Reglan [metoclopramide] and Reglan [metoclopramide]    Social History:    reports that she has been smoking cigarettes. She has a 7.5 pack-year smoking history. She has never used smokeless tobacco. She reports current alcohol use. She reports that she does not use drugs.    Family History:   family history includes Breast Cancer (age of onset: 25) in her maternal aunt; COPD in her maternal grandfather; Cancer (age of onset: 40) in her maternal aunt; Cancer (age of onset: 63) in her father; Heart Attack (age of onset: 50) in her paternal grandfather; Heart Attack (age of onset: 55) in her father.      PHYSICAL EXAM:    Vitals:  BP (!) 128/90   Pulse 76   Temp 97.5 °F (36.4 °C) (Oral)   Resp 18   Wt 111.4 kg (245 lb 11.2 oz)   LMP 01/28/2025   SpO2 95%   BMI 37.36 kg/m²           LABS:  All labs reviewed.  Of note:  CBC with Differential:

## 2025-01-30 LAB
EKG ATRIAL RATE: 84 BPM
EKG P AXIS: -3 DEGREES
EKG P-R INTERVAL: 170 MS
EKG Q-T INTERVAL: 412 MS
EKG QRS DURATION: 64 MS
EKG QTC CALCULATION (BAZETT): 486 MS
EKG R AXIS: 31 DEGREES
EKG T AXIS: 51 DEGREES
EKG VENTRICULAR RATE: 84 BPM

## 2025-01-30 PROCEDURE — 93010 ELECTROCARDIOGRAM REPORT: CPT | Performed by: INTERNAL MEDICINE

## 2025-03-02 ENCOUNTER — APPOINTMENT (OUTPATIENT)
Dept: CT IMAGING | Age: 45
DRG: 073 | End: 2025-03-02
Payer: MEDICAID

## 2025-03-02 ENCOUNTER — APPOINTMENT (OUTPATIENT)
Dept: GENERAL RADIOLOGY | Age: 45
DRG: 073 | End: 2025-03-02
Payer: MEDICAID

## 2025-03-02 ENCOUNTER — HOSPITAL ENCOUNTER (INPATIENT)
Age: 45
LOS: 2 days | Discharge: HOME | DRG: 073 | End: 2025-03-04
Attending: EMERGENCY MEDICINE | Admitting: INTERNAL MEDICINE
Payer: MEDICAID

## 2025-03-02 DIAGNOSIS — F10.920 ACUTE ALCOHOLIC INTOXICATION WITHOUT COMPLICATION: Primary | ICD-10-CM
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
## 2025-03-02 DIAGNOSIS — F10.10 CHRONIC ALCOHOL ABUSE: ICD-10-CM

## 2025-03-02 DIAGNOSIS — S02.40CA CLOSED FRACTURE OF RIGHT SIDE OF MAXILLA, INITIAL ENCOUNTER (HCC): ICD-10-CM

## 2025-03-02 DIAGNOSIS — H50.631: ICD-10-CM

## 2025-03-02 DIAGNOSIS — S02.85XA CLOSED FRACTURE OF ORBITAL WALL, INITIAL ENCOUNTER (HCC): ICD-10-CM

## 2025-03-02 DIAGNOSIS — S02.30XA: ICD-10-CM

## 2025-03-02 LAB
ABO + RH BLD: NORMAL
ALBUMIN SERPL-MCNC: 4.4 G/DL (ref 3.5–5.2)
ALP SERPL-CCNC: 65 U/L (ref 35–104)
ALT SERPL-CCNC: 27 U/L (ref 0–32)
ANION GAP SERPL CALCULATED.3IONS-SCNC: 13 MMOL/L (ref 7–16)
ARM BAND NUMBER: NORMAL
AST SERPL-CCNC: 30 U/L (ref 0–31)
BASOPHILS # BLD: 0.05 K/UL (ref 0–0.2)
BASOPHILS NFR BLD: 1 % (ref 0–2)
BILIRUB SERPL-MCNC: 0.5 MG/DL (ref 0–1.2)
BLOOD BANK SAMPLE EXPIRATION: NORMAL
BLOOD GROUP ANTIBODIES SERPL: NEGATIVE
BUN SERPL-MCNC: 7 MG/DL (ref 6–20)
CALCIUM SERPL-MCNC: 9 MG/DL (ref 8.6–10.2)
CHLORIDE SERPL-SCNC: 107 MMOL/L (ref 98–107)
CO2 SERPL-SCNC: 26 MMOL/L (ref 22–29)
CREAT SERPL-MCNC: 0.7 MG/DL (ref 0.5–1)
EOSINOPHIL # BLD: 0.07 K/UL (ref 0.05–0.5)
EOSINOPHILS RELATIVE PERCENT: 1 % (ref 0–6)
ERYTHROCYTE [DISTWIDTH] IN BLOOD BY AUTOMATED COUNT: 14.7 % (ref 11.5–15)
ETHANOLAMINE SERPL-MCNC: 375 MG/DL (ref 0–0.08)
GFR, ESTIMATED: >90 ML/MIN/1.73M2
GLUCOSE SERPL-MCNC: 95 MG/DL (ref 74–99)
HCT VFR BLD AUTO: 49.1 % (ref 34–48)
HGB BLD-MCNC: 16.5 G/DL (ref 11.5–15.5)
IMM GRANULOCYTES # BLD AUTO: 0.05 K/UL (ref 0–0.58)
IMM GRANULOCYTES NFR BLD: 1 % (ref 0–5)
INR PPP: 1
LYMPHOCYTES NFR BLD: 2.38 K/UL (ref 1.5–4)
LYMPHOCYTES RELATIVE PERCENT: 28 % (ref 20–42)
MCH RBC QN AUTO: 35.5 PG (ref 26–35)
MCHC RBC AUTO-ENTMCNC: 33.6 G/DL (ref 32–34.5)
MCV RBC AUTO: 105.6 FL (ref 80–99.9)
MONOCYTES NFR BLD: 0.59 K/UL (ref 0.1–0.95)
MONOCYTES NFR BLD: 7 % (ref 2–12)
NEUTROPHILS NFR BLD: 63 % (ref 43–80)
NEUTS SEG NFR BLD: 5.37 K/UL (ref 1.8–7.3)
PLATELET # BLD AUTO: 201 K/UL (ref 130–450)
PMV BLD AUTO: 9.8 FL (ref 7–12)
POTASSIUM SERPL-SCNC: 3.9 MMOL/L (ref 3.5–5)
PROT SERPL-MCNC: 7.4 G/DL (ref 6.4–8.3)
PROTHROMBIN TIME: 10.6 SEC (ref 9.3–12.4)
RBC # BLD AUTO: 4.65 M/UL (ref 3.5–5.5)
SODIUM SERPL-SCNC: 146 MMOL/L (ref 132–146)
WBC OTHER # BLD: 8.5 K/UL (ref 4.5–11.5)

## 2025-03-02 PROCEDURE — 96374 THER/PROPH/DIAG INJ IV PUSH: CPT

## 2025-03-02 PROCEDURE — 80053 COMPREHEN METABOLIC PANEL: CPT

## 2025-03-02 PROCEDURE — 2500000003 HC RX 250 WO HCPCS: Performed by: NURSE PRACTITIONER

## 2025-03-02 PROCEDURE — 70450 CT HEAD/BRAIN W/O DYE: CPT

## 2025-03-02 PROCEDURE — 6370000000 HC RX 637 (ALT 250 FOR IP): Performed by: EMERGENCY MEDICINE

## 2025-03-02 PROCEDURE — 6360000002 HC RX W HCPCS: Performed by: EMERGENCY MEDICINE

## 2025-03-02 PROCEDURE — 6370000000 HC RX 637 (ALT 250 FOR IP): Performed by: NURSE PRACTITIONER

## 2025-03-02 PROCEDURE — 2060000000 HC ICU INTERMEDIATE R&B

## 2025-03-02 PROCEDURE — 2580000003 HC RX 258: Performed by: EMERGENCY MEDICINE

## 2025-03-02 PROCEDURE — 71045 X-RAY EXAM CHEST 1 VIEW: CPT

## 2025-03-02 PROCEDURE — 1200000000 HC SEMI PRIVATE

## 2025-03-02 PROCEDURE — 86900 BLOOD TYPING SEROLOGIC ABO: CPT

## 2025-03-02 PROCEDURE — 99285 EMERGENCY DEPT VISIT HI MDM: CPT

## 2025-03-02 PROCEDURE — 70486 CT MAXILLOFACIAL W/O DYE: CPT

## 2025-03-02 PROCEDURE — 85610 PROTHROMBIN TIME: CPT

## 2025-03-02 PROCEDURE — 86850 RBC ANTIBODY SCREEN: CPT

## 2025-03-02 PROCEDURE — HZ2ZZZZ DETOXIFICATION SERVICES FOR SUBSTANCE ABUSE TREATMENT: ICD-10-PCS | Performed by: INTERNAL MEDICINE

## 2025-03-02 PROCEDURE — 76376 3D RENDER W/INTRP POSTPROCES: CPT

## 2025-03-02 PROCEDURE — 6360000002 HC RX W HCPCS: Performed by: NURSE PRACTITIONER

## 2025-03-02 PROCEDURE — 86901 BLOOD TYPING SEROLOGIC RH(D): CPT

## 2025-03-02 PROCEDURE — 85025 COMPLETE CBC W/AUTO DIFF WBC: CPT

## 2025-03-02 PROCEDURE — G0480 DRUG TEST DEF 1-7 CLASSES: HCPCS

## 2025-03-02 PROCEDURE — 72125 CT NECK SPINE W/O DYE: CPT

## 2025-03-02 RX ORDER — LANOLIN ALCOHOL/MO/W.PET/CERES
100 CREAM (GRAM) TOPICAL DAILY
Status: DISCONTINUED | OUTPATIENT
Start: 2025-03-03 | End: 2025-03-04 | Stop reason: HOSPADM

## 2025-03-02 RX ORDER — ONDANSETRON 2 MG/ML
4 INJECTION INTRAMUSCULAR; INTRAVENOUS EVERY 6 HOURS PRN
Status: DISCONTINUED | OUTPATIENT
Start: 2025-03-02 | End: 2025-03-04 | Stop reason: HOSPADM

## 2025-03-02 RX ORDER — MAGNESIUM SULFATE IN WATER 40 MG/ML
2000 INJECTION, SOLUTION INTRAVENOUS PRN
Status: DISCONTINUED | OUTPATIENT
Start: 2025-03-02 | End: 2025-03-04 | Stop reason: HOSPADM

## 2025-03-02 RX ORDER — LORAZEPAM 1 MG/1
3 TABLET ORAL
Status: DISCONTINUED | OUTPATIENT
Start: 2025-03-02 | End: 2025-03-04 | Stop reason: HOSPADM

## 2025-03-02 RX ORDER — LORAZEPAM 1 MG/1
4 TABLET ORAL
Status: DISCONTINUED | OUTPATIENT
Start: 2025-03-02 | End: 2025-03-04 | Stop reason: HOSPADM

## 2025-03-02 RX ORDER — ALPRAZOLAM 1 MG/1
1 TABLET ORAL ONCE
Status: COMPLETED | OUTPATIENT
Start: 2025-03-02 | End: 2025-03-02

## 2025-03-02 RX ORDER — ALPRAZOLAM 1 MG/1
1 TABLET ORAL NIGHTLY PRN
Status: DISCONTINUED | OUTPATIENT
Start: 2025-03-03 | End: 2025-03-04 | Stop reason: HOSPADM

## 2025-03-02 RX ORDER — FENTANYL CITRATE 50 UG/ML
25 INJECTION, SOLUTION INTRAMUSCULAR; INTRAVENOUS ONCE
Status: COMPLETED | OUTPATIENT
Start: 2025-03-02 | End: 2025-03-02

## 2025-03-02 RX ORDER — POLYETHYLENE GLYCOL 3350 17 G/17G
17 POWDER, FOR SOLUTION ORAL DAILY PRN
Status: DISCONTINUED | OUTPATIENT
Start: 2025-03-02 | End: 2025-03-04 | Stop reason: HOSPADM

## 2025-03-02 RX ORDER — ONDANSETRON 4 MG/1
4 TABLET, ORALLY DISINTEGRATING ORAL EVERY 8 HOURS PRN
Status: DISCONTINUED | OUTPATIENT
Start: 2025-03-02 | End: 2025-03-04 | Stop reason: HOSPADM

## 2025-03-02 RX ORDER — ACETAMINOPHEN 650 MG/1
650 SUPPOSITORY RECTAL EVERY 6 HOURS PRN
Status: DISCONTINUED | OUTPATIENT
Start: 2025-03-02 | End: 2025-03-04 | Stop reason: HOSPADM

## 2025-03-02 RX ORDER — CHOLECALCIFEROL (VITAMIN D3) 1250 MCG
CAPSULE ORAL
COMMUNITY

## 2025-03-02 RX ORDER — LORAZEPAM 2 MG/ML
1 INJECTION INTRAMUSCULAR
Status: DISCONTINUED | OUTPATIENT
Start: 2025-03-02 | End: 2025-03-04 | Stop reason: HOSPADM

## 2025-03-02 RX ORDER — LORAZEPAM 2 MG/ML
4 INJECTION INTRAMUSCULAR
Status: DISCONTINUED | OUTPATIENT
Start: 2025-03-02 | End: 2025-03-04 | Stop reason: HOSPADM

## 2025-03-02 RX ORDER — POTASSIUM CHLORIDE 7.45 MG/ML
10 INJECTION INTRAVENOUS PRN
Status: DISCONTINUED | OUTPATIENT
Start: 2025-03-02 | End: 2025-03-04 | Stop reason: HOSPADM

## 2025-03-02 RX ORDER — POTASSIUM CHLORIDE 1500 MG/1
40 TABLET, EXTENDED RELEASE ORAL PRN
Status: DISCONTINUED | OUTPATIENT
Start: 2025-03-02 | End: 2025-03-04 | Stop reason: HOSPADM

## 2025-03-02 RX ORDER — MONTELUKAST SODIUM 10 MG/1
10 TABLET ORAL NIGHTLY
COMMUNITY

## 2025-03-02 RX ORDER — METOPROLOL TARTRATE 50 MG
100 TABLET ORAL 2 TIMES DAILY
Status: DISCONTINUED | OUTPATIENT
Start: 2025-03-02 | End: 2025-03-04 | Stop reason: HOSPADM

## 2025-03-02 RX ORDER — ACETAMINOPHEN 325 MG/1
650 TABLET ORAL EVERY 6 HOURS PRN
Status: DISCONTINUED | OUTPATIENT
Start: 2025-03-02 | End: 2025-03-04 | Stop reason: HOSPADM

## 2025-03-02 RX ORDER — LORAZEPAM 1 MG/1
2 TABLET ORAL
Status: DISCONTINUED | OUTPATIENT
Start: 2025-03-02 | End: 2025-03-04 | Stop reason: HOSPADM

## 2025-03-02 RX ORDER — SODIUM CHLORIDE 0.9 % (FLUSH) 0.9 %
5-40 SYRINGE (ML) INJECTION EVERY 12 HOURS SCHEDULED
Status: DISCONTINUED | OUTPATIENT
Start: 2025-03-02 | End: 2025-03-04 | Stop reason: HOSPADM

## 2025-03-02 RX ORDER — LORAZEPAM 2 MG/ML
2 INJECTION INTRAMUSCULAR
Status: DISCONTINUED | OUTPATIENT
Start: 2025-03-02 | End: 2025-03-04 | Stop reason: HOSPADM

## 2025-03-02 RX ORDER — ENOXAPARIN SODIUM 100 MG/ML
30 INJECTION SUBCUTANEOUS 2 TIMES DAILY
Status: DISCONTINUED | OUTPATIENT
Start: 2025-03-03 | End: 2025-03-04 | Stop reason: HOSPADM

## 2025-03-02 RX ORDER — ACETAMINOPHEN 500 MG
1000 TABLET ORAL ONCE
Status: COMPLETED | OUTPATIENT
Start: 2025-03-02 | End: 2025-03-02

## 2025-03-02 RX ORDER — SODIUM CHLORIDE 0.9 % (FLUSH) 0.9 %
5-40 SYRINGE (ML) INJECTION PRN
Status: DISCONTINUED | OUTPATIENT
Start: 2025-03-02 | End: 2025-03-04 | Stop reason: HOSPADM

## 2025-03-02 RX ORDER — OXYCODONE HYDROCHLORIDE 5 MG/1
5 TABLET ORAL ONCE
Status: DISCONTINUED | OUTPATIENT
Start: 2025-03-02 | End: 2025-03-02

## 2025-03-02 RX ORDER — LORAZEPAM 1 MG/1
1 TABLET ORAL
Status: DISCONTINUED | OUTPATIENT
Start: 2025-03-02 | End: 2025-03-04 | Stop reason: HOSPADM

## 2025-03-02 RX ORDER — PANTOPRAZOLE SODIUM 40 MG/1
40 TABLET, DELAYED RELEASE ORAL
Status: DISCONTINUED | OUTPATIENT
Start: 2025-03-03 | End: 2025-03-04 | Stop reason: HOSPADM

## 2025-03-02 RX ORDER — SODIUM CHLORIDE 9 MG/ML
INJECTION, SOLUTION INTRAVENOUS PRN
Status: DISCONTINUED | OUTPATIENT
Start: 2025-03-02 | End: 2025-03-04 | Stop reason: HOSPADM

## 2025-03-02 RX ORDER — LORAZEPAM 2 MG/ML
3 INJECTION INTRAMUSCULAR
Status: DISCONTINUED | OUTPATIENT
Start: 2025-03-02 | End: 2025-03-04 | Stop reason: HOSPADM

## 2025-03-02 RX ORDER — MORPHINE SULFATE 2 MG/ML
2 INJECTION, SOLUTION INTRAMUSCULAR; INTRAVENOUS ONCE
Status: COMPLETED | OUTPATIENT
Start: 2025-03-02 | End: 2025-03-03

## 2025-03-02 RX ADMIN — METOPROLOL TARTRATE 100 MG: 50 TABLET, FILM COATED ORAL at 21:16

## 2025-03-02 RX ADMIN — SODIUM CHLORIDE, PRESERVATIVE FREE 10 ML: 5 INJECTION INTRAVENOUS at 21:17

## 2025-03-02 RX ADMIN — SODIUM CHLORIDE 3000 MG: 9 INJECTION, SOLUTION INTRAVENOUS at 19:06

## 2025-03-02 RX ADMIN — ACETAMINOPHEN 1000 MG: 500 TABLET ORAL at 19:04

## 2025-03-02 RX ADMIN — FENTANYL CITRATE 25 MCG: 50 INJECTION INTRAMUSCULAR; INTRAVENOUS at 16:10

## 2025-03-02 RX ADMIN — ONDANSETRON 4 MG: 2 INJECTION, SOLUTION INTRAMUSCULAR; INTRAVENOUS at 23:07

## 2025-03-02 RX ADMIN — ALPRAZOLAM 1 MG: 1 TABLET ORAL at 23:57

## 2025-03-02 ASSESSMENT — PAIN DESCRIPTION - LOCATION: LOCATION: EYE

## 2025-03-02 ASSESSMENT — PAIN SCALES - GENERAL
PAINLEVEL_OUTOF10: 3
PAINLEVEL_OUTOF10: 7

## 2025-03-02 ASSESSMENT — LIFESTYLE VARIABLES
HOW MANY STANDARD DRINKS CONTAINING ALCOHOL DO YOU HAVE ON A TYPICAL DAY: 10 OR MORE
HOW OFTEN DO YOU HAVE A DRINK CONTAINING ALCOHOL: 2-3 TIMES A WEEK

## 2025-03-02 ASSESSMENT — PAIN DESCRIPTION - ORIENTATION: ORIENTATION: RIGHT
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
## 2025-03-03 ENCOUNTER — ANESTHESIA (OUTPATIENT)
Dept: OPERATING ROOM | Age: 45
DRG: 073 | End: 2025-03-03
Payer: MEDICAID

## 2025-03-03 ENCOUNTER — ANESTHESIA EVENT (OUTPATIENT)
Dept: OPERATING ROOM | Age: 45
DRG: 073 | End: 2025-03-03
Payer: MEDICAID

## 2025-03-03 PROBLEM — F10.920 ACUTE ALCOHOLIC INTOXICATION WITHOUT COMPLICATION: Status: ACTIVE | Noted: 2025-03-03

## 2025-03-03 LAB
ALBUMIN SERPL-MCNC: 4.1 G/DL (ref 3.5–5.2)
ALP SERPL-CCNC: 65 U/L (ref 35–104)
ALT SERPL-CCNC: 23 U/L (ref 0–32)
ANION GAP SERPL CALCULATED.3IONS-SCNC: 16 MMOL/L (ref 7–16)
AST SERPL-CCNC: 26 U/L (ref 0–31)
BASOPHILS # BLD: 0.05 K/UL (ref 0–0.2)
BASOPHILS NFR BLD: 1 % (ref 0–2)
BILIRUB DIRECT SERPL-MCNC: <0.2 MG/DL (ref 0–0.3)
BILIRUB INDIRECT SERPL-MCNC: NORMAL MG/DL (ref 0–1)
BILIRUB SERPL-MCNC: 0.4 MG/DL (ref 0–1.2)
BUN SERPL-MCNC: 9 MG/DL (ref 6–20)
CALCIUM SERPL-MCNC: 9.1 MG/DL (ref 8.6–10.2)
CHLORIDE SERPL-SCNC: 100 MMOL/L (ref 98–107)
CO2 SERPL-SCNC: 23 MMOL/L (ref 22–29)
CREAT SERPL-MCNC: 0.7 MG/DL (ref 0.5–1)
EOSINOPHIL # BLD: 0.11 K/UL (ref 0.05–0.5)
EOSINOPHILS RELATIVE PERCENT: 1 % (ref 0–6)
ERYTHROCYTE [DISTWIDTH] IN BLOOD BY AUTOMATED COUNT: 14.5 % (ref 11.5–15)
GFR, ESTIMATED: >90 ML/MIN/1.73M2
GLUCOSE BLD-MCNC: 103 MG/DL (ref 74–99)
GLUCOSE SERPL-MCNC: 101 MG/DL (ref 74–99)
HCG, URINE, POC: NEGATIVE
HCT VFR BLD AUTO: 45.3 % (ref 34–48)
HGB BLD-MCNC: 15.3 G/DL (ref 11.5–15.5)
IMM GRANULOCYTES # BLD AUTO: 0.04 K/UL (ref 0–0.58)
IMM GRANULOCYTES NFR BLD: 1 % (ref 0–5)
LYMPHOCYTES NFR BLD: 2.21 K/UL (ref 1.5–4)
LYMPHOCYTES RELATIVE PERCENT: 29 % (ref 20–42)
Lab: NORMAL
MCH RBC QN AUTO: 35.5 PG (ref 26–35)
MCHC RBC AUTO-ENTMCNC: 33.8 G/DL (ref 32–34.5)
MCV RBC AUTO: 105.1 FL (ref 80–99.9)
MONOCYTES NFR BLD: 0.67 K/UL (ref 0.1–0.95)
MONOCYTES NFR BLD: 9 % (ref 2–12)
NEGATIVE QC PASS/FAIL: NORMAL
NEUTROPHILS NFR BLD: 60 % (ref 43–80)
NEUTS SEG NFR BLD: 4.52 K/UL (ref 1.8–7.3)
PLATELET # BLD AUTO: 191 K/UL (ref 130–450)
PMV BLD AUTO: 9.8 FL (ref 7–12)
POSITIVE QC PASS/FAIL: NORMAL
POTASSIUM SERPL-SCNC: 3.4 MMOL/L (ref 3.5–5)
PROT SERPL-MCNC: 6.8 G/DL (ref 6.4–8.3)
RBC # BLD AUTO: 4.31 M/UL (ref 3.5–5.5)
SODIUM SERPL-SCNC: 139 MMOL/L (ref 132–146)
WBC OTHER # BLD: 7.6 K/UL (ref 4.5–11.5)

## 2025-03-03 PROCEDURE — 6360000002 HC RX W HCPCS: Performed by: STUDENT IN AN ORGANIZED HEALTH CARE EDUCATION/TRAINING PROGRAM

## 2025-03-03 PROCEDURE — 3700000000 HC ANESTHESIA ATTENDED CARE: Performed by: OTOLARYNGOLOGY

## 2025-03-03 PROCEDURE — 21390 OPN TX ORBIT PERIORBTL IMPLT: CPT | Performed by: OTOLARYNGOLOGY

## 2025-03-03 PROCEDURE — 1200000000 HC SEMI PRIVATE

## 2025-03-03 PROCEDURE — 3600000013 HC SURGERY LEVEL 3 ADDTL 15MIN: Performed by: OTOLARYNGOLOGY

## 2025-03-03 PROCEDURE — 3600000003 HC SURGERY LEVEL 3 BASE: Performed by: OTOLARYNGOLOGY

## 2025-03-03 PROCEDURE — 2720000010 HC SURG SUPPLY STERILE: Performed by: OTOLARYNGOLOGY

## 2025-03-03 PROCEDURE — 2709999900 HC NON-CHARGEABLE SUPPLY: Performed by: OTOLARYNGOLOGY

## 2025-03-03 PROCEDURE — 6370000000 HC RX 637 (ALT 250 FOR IP): Performed by: STUDENT IN AN ORGANIZED HEALTH CARE EDUCATION/TRAINING PROGRAM

## 2025-03-03 PROCEDURE — 2580000003 HC RX 258

## 2025-03-03 PROCEDURE — 6360000002 HC RX W HCPCS

## 2025-03-03 PROCEDURE — 6360000002 HC RX W HCPCS: Performed by: OTOLARYNGOLOGY

## 2025-03-03 PROCEDURE — 2500000003 HC RX 250 WO HCPCS: Performed by: NURSE PRACTITIONER

## 2025-03-03 PROCEDURE — 82962 GLUCOSE BLOOD TEST: CPT

## 2025-03-03 PROCEDURE — 82248 BILIRUBIN DIRECT: CPT

## 2025-03-03 PROCEDURE — 7100000001 HC PACU RECOVERY - ADDTL 15 MIN: Performed by: OTOLARYNGOLOGY

## 2025-03-03 PROCEDURE — 80053 COMPREHEN METABOLIC PANEL: CPT

## 2025-03-03 PROCEDURE — 6370000000 HC RX 637 (ALT 250 FOR IP)

## 2025-03-03 PROCEDURE — 3700000001 HC ADD 15 MINUTES (ANESTHESIA): Performed by: OTOLARYNGOLOGY

## 2025-03-03 PROCEDURE — 85025 COMPLETE CBC W/AUTO DIFF WBC: CPT

## 2025-03-03 PROCEDURE — 6370000000 HC RX 637 (ALT 250 FOR IP): Performed by: NURSE PRACTITIONER

## 2025-03-03 PROCEDURE — 7100000000 HC PACU RECOVERY - FIRST 15 MIN: Performed by: OTOLARYNGOLOGY

## 2025-03-03 PROCEDURE — 6370000000 HC RX 637 (ALT 250 FOR IP): Performed by: OTOLARYNGOLOGY

## 2025-03-03 PROCEDURE — 2500000003 HC RX 250 WO HCPCS

## 2025-03-03 PROCEDURE — 99221 1ST HOSP IP/OBS SF/LOW 40: CPT | Performed by: OTOLARYNGOLOGY

## 2025-03-03 PROCEDURE — C1713 ANCHOR/SCREW BN/BN,TIS/BN: HCPCS | Performed by: OTOLARYNGOLOGY

## 2025-03-03 PROCEDURE — 6360000002 HC RX W HCPCS: Performed by: NURSE PRACTITIONER

## 2025-03-03 PROCEDURE — 0NSP04Z REPOSITION RIGHT ORBIT WITH INTERNAL FIXATION DEVICE, OPEN APPROACH: ICD-10-PCS | Performed by: OTOLARYNGOLOGY

## 2025-03-03 PROCEDURE — 2060000000 HC ICU INTERMEDIATE R&B

## 2025-03-03 PROCEDURE — 2580000003 HC RX 258: Performed by: NURSE PRACTITIONER

## 2025-03-03 DEVICE — BONE SCREWS, AXS, SELF-DRILLING
Type: IMPLANTABLE DEVICE | Site: FACE | Status: FUNCTIONAL
Brand: AXS

## 2025-03-03 DEVICE — ORBITAL FLOOR AND WALL MTB RIGHT
Type: IMPLANTABLE DEVICE | Site: FACE | Status: FUNCTIONAL
Brand: MEDPOR TITAN

## 2025-03-03 RX ORDER — MIDAZOLAM HYDROCHLORIDE 1 MG/ML
INJECTION, SOLUTION INTRAMUSCULAR; INTRAVENOUS
Status: DISCONTINUED | OUTPATIENT
Start: 2025-03-03 | End: 2025-03-03 | Stop reason: SDUPTHER

## 2025-03-03 RX ORDER — SCOPOLAMINE 1 MG/3D
PATCH, EXTENDED RELEASE TRANSDERMAL
Status: DISPENSED
Start: 2025-03-03 | End: 2025-03-04

## 2025-03-03 RX ORDER — DIPHENHYDRAMINE HYDROCHLORIDE 50 MG/ML
12.5 INJECTION INTRAMUSCULAR; INTRAVENOUS
Status: DISCONTINUED | OUTPATIENT
Start: 2025-03-03 | End: 2025-03-03 | Stop reason: HOSPADM

## 2025-03-03 RX ORDER — PROPOFOL 10 MG/ML
INJECTION, EMULSION INTRAVENOUS
Status: DISCONTINUED | OUTPATIENT
Start: 2025-03-03 | End: 2025-03-03 | Stop reason: SDUPTHER

## 2025-03-03 RX ORDER — SODIUM CHLORIDE 0.9 % (FLUSH) 0.9 %
5-40 SYRINGE (ML) INJECTION EVERY 12 HOURS SCHEDULED
Status: DISCONTINUED | OUTPATIENT
Start: 2025-03-03 | End: 2025-03-03 | Stop reason: HOSPADM

## 2025-03-03 RX ORDER — SODIUM CHLORIDE 9 MG/ML
INJECTION, SOLUTION INTRAVENOUS PRN
Status: DISCONTINUED | OUTPATIENT
Start: 2025-03-03 | End: 2025-03-03 | Stop reason: HOSPADM

## 2025-03-03 RX ORDER — MEPERIDINE HYDROCHLORIDE 50 MG/ML
12.5 INJECTION INTRAMUSCULAR; INTRAVENOUS; SUBCUTANEOUS
Status: DISCONTINUED | OUTPATIENT
Start: 2025-03-03 | End: 2025-03-03 | Stop reason: HOSPADM

## 2025-03-03 RX ORDER — DEXAMETHASONE SODIUM PHOSPHATE 10 MG/ML
10 INJECTION, SOLUTION INTRAMUSCULAR; INTRAVENOUS EVERY 8 HOURS
Status: DISCONTINUED | OUTPATIENT
Start: 2025-03-04 | End: 2025-03-04 | Stop reason: HOSPADM

## 2025-03-03 RX ORDER — PROCHLORPERAZINE EDISYLATE 5 MG/ML
5 INJECTION INTRAMUSCULAR; INTRAVENOUS
Status: DISCONTINUED | OUTPATIENT
Start: 2025-03-03 | End: 2025-03-03 | Stop reason: HOSPADM

## 2025-03-03 RX ORDER — NEOMYCIN SULFATE, POLYMYXIN B SULFATE, AND DEXAMETHASONE 3.5; 10000; 1 MG/G; [USP'U]/G; MG/G
OINTMENT OPHTHALMIC PRN
Status: DISCONTINUED | OUTPATIENT
Start: 2025-03-03 | End: 2025-03-03 | Stop reason: ALTCHOICE

## 2025-03-03 RX ORDER — DEXAMETHASONE SODIUM PHOSPHATE 4 MG/ML
INJECTION, SOLUTION INTRA-ARTICULAR; INTRALESIONAL; INTRAMUSCULAR; INTRAVENOUS; SOFT TISSUE
Status: DISCONTINUED | OUTPATIENT
Start: 2025-03-03 | End: 2025-03-03 | Stop reason: SDUPTHER

## 2025-03-03 RX ORDER — SODIUM CHLORIDE 0.9 % (FLUSH) 0.9 %
5-40 SYRINGE (ML) INJECTION PRN
Status: DISCONTINUED | OUTPATIENT
Start: 2025-03-03 | End: 2025-03-03 | Stop reason: HOSPADM

## 2025-03-03 RX ORDER — MORPHINE SULFATE 4 MG/ML
4 INJECTION, SOLUTION INTRAMUSCULAR; INTRAVENOUS
Status: DISCONTINUED | OUTPATIENT
Start: 2025-03-03 | End: 2025-03-04 | Stop reason: HOSPADM

## 2025-03-03 RX ORDER — ROCURONIUM BROMIDE 10 MG/ML
INJECTION, SOLUTION INTRAVENOUS
Status: DISCONTINUED | OUTPATIENT
Start: 2025-03-03 | End: 2025-03-03 | Stop reason: SDUPTHER

## 2025-03-03 RX ORDER — DEXMEDETOMIDINE HYDROCHLORIDE 100 UG/ML
INJECTION, SOLUTION INTRAVENOUS
Status: DISCONTINUED | OUTPATIENT
Start: 2025-03-03 | End: 2025-03-03 | Stop reason: SDUPTHER

## 2025-03-03 RX ORDER — IPRATROPIUM BROMIDE AND ALBUTEROL SULFATE 2.5; .5 MG/3ML; MG/3ML
1 SOLUTION RESPIRATORY (INHALATION)
Status: DISCONTINUED | OUTPATIENT
Start: 2025-03-03 | End: 2025-03-03 | Stop reason: HOSPADM

## 2025-03-03 RX ORDER — HYDROCODONE BITARTRATE AND ACETAMINOPHEN 5; 325 MG/1; MG/1
1 TABLET ORAL EVERY 6 HOURS PRN
Status: DISCONTINUED | OUTPATIENT
Start: 2025-03-03 | End: 2025-03-04 | Stop reason: HOSPADM

## 2025-03-03 RX ORDER — FENTANYL CITRATE 50 UG/ML
INJECTION, SOLUTION INTRAMUSCULAR; INTRAVENOUS
Status: DISCONTINUED | OUTPATIENT
Start: 2025-03-03 | End: 2025-03-03 | Stop reason: SDUPTHER

## 2025-03-03 RX ORDER — PHENYLEPHRINE HCL IN 0.9% NACL 1 MG/10 ML
SYRINGE (ML) INTRAVENOUS
Status: DISCONTINUED | OUTPATIENT
Start: 2025-03-03 | End: 2025-03-03 | Stop reason: SDUPTHER

## 2025-03-03 RX ORDER — LABETALOL HYDROCHLORIDE 5 MG/ML
5 INJECTION, SOLUTION INTRAVENOUS
Status: DISCONTINUED | OUTPATIENT
Start: 2025-03-03 | End: 2025-03-03 | Stop reason: HOSPADM

## 2025-03-03 RX ORDER — NALOXONE HYDROCHLORIDE 0.4 MG/ML
INJECTION, SOLUTION INTRAMUSCULAR; INTRAVENOUS; SUBCUTANEOUS PRN
Status: DISCONTINUED | OUTPATIENT
Start: 2025-03-03 | End: 2025-03-03 | Stop reason: HOSPADM

## 2025-03-03 RX ORDER — LIDOCAINE HYDROCHLORIDE 20 MG/ML
INJECTION, SOLUTION EPIDURAL; INFILTRATION; INTRACAUDAL; PERINEURAL
Status: DISCONTINUED | OUTPATIENT
Start: 2025-03-03 | End: 2025-03-03 | Stop reason: SDUPTHER

## 2025-03-03 RX ORDER — LIDOCAINE HYDROCHLORIDE AND EPINEPHRINE 10; 10 MG/ML; UG/ML
INJECTION, SOLUTION INFILTRATION; PERINEURAL PRN
Status: DISCONTINUED | OUTPATIENT
Start: 2025-03-03 | End: 2025-03-03 | Stop reason: ALTCHOICE

## 2025-03-03 RX ORDER — ONDANSETRON 2 MG/ML
INJECTION INTRAMUSCULAR; INTRAVENOUS
Status: DISCONTINUED | OUTPATIENT
Start: 2025-03-03 | End: 2025-03-03 | Stop reason: SDUPTHER

## 2025-03-03 RX ORDER — ONDANSETRON 2 MG/ML
4 INJECTION INTRAMUSCULAR; INTRAVENOUS
Status: DISCONTINUED | OUTPATIENT
Start: 2025-03-03 | End: 2025-03-03 | Stop reason: HOSPADM

## 2025-03-03 RX ADMIN — METOPROLOL TARTRATE 100 MG: 50 TABLET, FILM COATED ORAL at 07:58

## 2025-03-03 RX ADMIN — Medication 100 MG: at 07:58

## 2025-03-03 RX ADMIN — LIDOCAINE HYDROCHLORIDE 100 MG: 20 INJECTION, SOLUTION EPIDURAL; INFILTRATION; INTRACAUDAL; PERINEURAL at 17:15

## 2025-03-03 RX ADMIN — MORPHINE SULFATE 2 MG: 2 INJECTION, SOLUTION INTRAMUSCULAR; INTRAVENOUS at 02:39

## 2025-03-03 RX ADMIN — ONDANSETRON 4 MG: 2 INJECTION, SOLUTION INTRAMUSCULAR; INTRAVENOUS at 22:18

## 2025-03-03 RX ADMIN — ROCURONIUM BROMIDE 50 MG: 10 INJECTION, SOLUTION INTRAVENOUS at 17:15

## 2025-03-03 RX ADMIN — FENTANYL CITRATE 50 MCG: 50 INJECTION, SOLUTION INTRAMUSCULAR; INTRAVENOUS at 17:46

## 2025-03-03 RX ADMIN — LORAZEPAM 1 MG: 2 INJECTION INTRAMUSCULAR; INTRAVENOUS at 07:58

## 2025-03-03 RX ADMIN — SODIUM CHLORIDE 3000 MG: 9 INJECTION, SOLUTION INTRAVENOUS at 17:20

## 2025-03-03 RX ADMIN — LORAZEPAM 2 MG: 2 INJECTION INTRAMUSCULAR; INTRAVENOUS at 22:12

## 2025-03-03 RX ADMIN — FENTANYL CITRATE 50 MCG: 50 INJECTION, SOLUTION INTRAMUSCULAR; INTRAVENOUS at 17:43

## 2025-03-03 RX ADMIN — DEXAMETHASONE SODIUM PHOSPHATE 10 MG: 4 INJECTION, SOLUTION INTRAMUSCULAR; INTRAVENOUS at 17:23

## 2025-03-03 RX ADMIN — Medication 50 MG: at 17:15

## 2025-03-03 RX ADMIN — HYDROMORPHONE HYDROCHLORIDE 0.5 MG: 1 INJECTION, SOLUTION INTRAMUSCULAR; INTRAVENOUS; SUBCUTANEOUS at 18:48

## 2025-03-03 RX ADMIN — HYDROMORPHONE HYDROCHLORIDE 0.5 MG: 1 INJECTION, SOLUTION INTRAMUSCULAR; INTRAVENOUS; SUBCUTANEOUS at 19:00

## 2025-03-03 RX ADMIN — FENTANYL CITRATE 100 MCG: 50 INJECTION, SOLUTION INTRAMUSCULAR; INTRAVENOUS at 17:15

## 2025-03-03 RX ADMIN — PROPOFOL 200 MG: 10 INJECTION, EMULSION INTRAVENOUS at 17:15

## 2025-03-03 RX ADMIN — MIDAZOLAM 2 MG: 1 INJECTION INTRAMUSCULAR; INTRAVENOUS at 17:03

## 2025-03-03 RX ADMIN — ONDANSETRON 4 MG: 2 INJECTION INTRAMUSCULAR; INTRAVENOUS at 18:09

## 2025-03-03 RX ADMIN — SODIUM CHLORIDE: 9 INJECTION, SOLUTION INTRAVENOUS at 17:03

## 2025-03-03 RX ADMIN — SUGAMMADEX 300 MG: 100 INJECTION, SOLUTION INTRAVENOUS at 18:15

## 2025-03-03 RX ADMIN — LORAZEPAM 2 MG: 2 INJECTION INTRAMUSCULAR; INTRAVENOUS at 13:50

## 2025-03-03 RX ADMIN — DEXMEDETOMIDINE 12 MCG: 100 INJECTION, SOLUTION INTRAVENOUS at 17:49

## 2025-03-03 RX ADMIN — SODIUM CHLORIDE, PRESERVATIVE FREE 10 ML: 5 INJECTION INTRAVENOUS at 07:59

## 2025-03-03 RX ADMIN — METOPROLOL TARTRATE 100 MG: 50 TABLET, FILM COATED ORAL at 22:12

## 2025-03-03 RX ADMIN — ENOXAPARIN SODIUM 30 MG: 100 INJECTION SUBCUTANEOUS at 22:12

## 2025-03-03 RX ADMIN — Medication 100 MCG: at 17:37

## 2025-03-03 ASSESSMENT — PAIN DESCRIPTION - ORIENTATION
ORIENTATION: RIGHT

## 2025-03-03 ASSESSMENT — PAIN - FUNCTIONAL ASSESSMENT
PAIN_FUNCTIONAL_ASSESSMENT: 0-10
PAIN_FUNCTIONAL_ASSESSMENT: PREVENTS OR INTERFERES SOME ACTIVE ACTIVITIES AND ADLS
PAIN_FUNCTIONAL_ASSESSMENT: PREVENTS OR INTERFERES SOME ACTIVE ACTIVITIES AND ADLS

## 2025-03-03 ASSESSMENT — PAIN DESCRIPTION - DESCRIPTORS
DESCRIPTORS: ACHING;DISCOMFORT;SORE
DESCRIPTORS: DISCOMFORT;BURNING
DESCRIPTORS: DISCOMFORT
DESCRIPTORS: ACHING;DISCOMFORT;SORE
DESCRIPTORS: DISCOMFORT

## 2025-03-03 ASSESSMENT — PAIN DESCRIPTION - LOCATION
LOCATION: EYE
LOCATION: EYE
LOCATION: EAR;EYE
LOCATION: EYE

## 2025-03-03 ASSESSMENT — PAIN SCALES - GENERAL
PAINLEVEL_OUTOF10: 8
PAINLEVEL_OUTOF10: 7

## 2025-03-03 ASSESSMENT — ENCOUNTER SYMPTOMS
EYE REDNESS: 1
EYE PAIN: 1
RESPIRATORY NEGATIVE: 1
GASTROINTESTINAL NEGATIVE: 1
SHORTNESS OF BREATH: 1
EYE DISCHARGE: 0

## 2025-03-03 ASSESSMENT — LIFESTYLE VARIABLES: SMOKING_STATUS: 1

## 2025-03-03 NOTE — ANESTHESIA PRE PROCEDURE
Department of Anesthesiology  Preprocedure Note       Name:  Katie Camara   Age:  44 y.o.  :  1980                                          MRN:  [id number]         Date:  3/3/2025      Surgeon: Surgeon(s):  Familia Gandhi DO    Procedure: RIGHT ORBITAL FRACTURE OPEN REDUCTION INTERNAL FIXATION (Right: Face)    Medications prior to admission:   Prior to Admission medications    Medication Sig Start Date End Date Taking? Authorizing Provider   Cholecalciferol (VITAMIN D3) 1.25 MG (11514 UT) CAPS TAKE ONE CAPSULE BY MOUTH EVERY WEEK   Yes Provider, MD Georgetet   montelukast (SINGULAIR) 10 MG tablet Take 1 tablet by mouth nightly   Yes Provider, MD Georgette   Semaglutide,0.25 or 0.5MG/DOS, 2 MG/1.5ML SOPN  24  Yes Provider, MD Georgette   pantoprazole (PROTONIX) 40 MG tablet Take 1 tablet by mouth every morning (before breakfast) 24  Yes Aman Doll MD   metoprolol (LOPRESSOR) 100 MG tablet Take 1 tablet by mouth 2 times daily   Yes Provider, MD Georgette   ALPRAZolam (XANAX) 1 MG tablet Take 1 tablet by mouth nightly as needed for Sleep.   Yes Provider, MD Georgette   fluticasone (FLONASE) 50 MCG/ACT nasal spray fluticasone propionate 50 mcg/actuation nasal spray,suspension   SHAKE LIQUID AND USE 2 SPRAYS IN EACH NOSTRIL EVERY DAY 22  Yes Provider, MD Georgette   ibuprofen (ADVIL;MOTRIN) 600 MG tablet Take 1 tablet by mouth 3 times daily as needed for Pain or Fever 1/18/20 3/2/25 Yes Joel Cassidy PA-C   ALPRAZolam (XANAX) 1 MG tablet Take 1 tablet by mouth 2 times daily as needed for Anxiety.   Yes Provider, MD Georgette   metoprolol tartrate 75 MG TABS TAKE 1 TABLET BY MOUTH TWICE DAILY  Patient taking differently: 100 mg TAKE 1 TABLET BY MOUTH TWICE DAILY 5/10/19  Yes Hyun Ayala DO   naproxen (NAPROSYN) 250 MG tablet Take 1 tablet by mouth 2 times daily for 7 days  Patient not taking: Reported on 2025 10/7/24 10/17/24  Cyndie Krishnan DO
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
## 2025-03-03 NOTE — PLAN OF CARE
Problem: Discharge Planning  Goal: Discharge to home or other facility with appropriate resources  3/3/2025 0810 by Segundo Lara, RN  Outcome: Progressing     Problem: Pain  Goal: Verbalizes/displays adequate comfort level or baseline comfort level  3/3/2025 0810 by Segundo Lara, RN  Outcome: Progressing

## 2025-03-03 NOTE — ED NOTES
ED to Inpatient Handoff Report    Notified Cathryn that electronic handoff available and patient ready for transport to room 747.    Safety Risks: Risk of falls and Visual impairment    Patient in Restraints: no    Constant Observer or Patient : no    Telemetry Monitoring Ordered :Yes           Order to transfer to unit without monitor:N/A    Last MEWS: 1 Time completed: 1938    Deterioration Index Score:   Predictive Model Details          21 (Normal)  Factor Value    Calculated 3/2/2025 19:39 34% Age 44 years old    Deterioration Index Model 28% Respiratory rate 20     28% Sodium 146 mmol/L     3% Pulse oximetry 99 %     2% Pulse 88     2% Systolic 108     2% Hematocrit abnormal (49.1 %)     1% WBC count 8.5 k/uL     0% Temperature 98 °F (36.7 °C)     0% Potassium 3.9 mmol/L        Vitals:    03/02/25 1820 03/02/25 1930 03/02/25 1932 03/02/25 1938   BP: 117/85 108/69 108/69 108/69   Pulse: 87 88 88 88   Resp: 16 20 20 20   Temp:   98 °F (36.7 °C) 98 °F (36.7 °C)   SpO2: 98% 99% 99% 99%   Weight:       Height:             Opportunity for questions and clarification was provided.
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
## 2025-03-03 NOTE — PATIENT CARE CONFERENCE
P Quality Flow/Interdisciplinary Rounds Progress Note        Quality Flow Rounds held on March 3, 2025    Disciplines Attending:  Bedside Nurse, , , and Nursing Unit Leadership    Katie Camara was admitted on 3/2/2025  2:02 PM    Anticipated Discharge Date:       Disposition:    Keith Score:  Keith Scale Score: 21    [facility] RISK OF UNPLANNED READMISSION 2.0             9.9 Total Score        Discussed patient goal for the day, patient clinical progression, and barriers to discharge.  The following Goal(s) of the Day/Commitment(s) have been identified:  for surgery today . Ciwa scale. Plan is home when medically stable       Yudelka Garvey RN  March 3, 2025

## 2025-03-03 NOTE — ANESTHESIA POSTPROCEDURE EVALUATION
Department of Anesthesiology  Postprocedure Note    Patient: Katie Camara  MRN: [id number]  YOB: 1980  Date of evaluation: 3/3/2025    Procedure Summary       Date: 03/03/25 Room / Location: [address]    Anesthesia Start: 1703 Anesthesia Stop: 1837    Procedure: RIGHT ORBITAL FRACTURE OPEN REDUCTION INTERNAL FIXATION (Right: Face) Diagnosis:       Closed fracture of orbital wall, initial encounter ([facility])      (Closed fracture of orbital wall, initial encounter ([facility]) [S02.85XA])    Surgeons: Familia Gandhi DO Responsible Provider: Alcides Bertrand DO    Anesthesia Type: General ASA Status: 3 - Emergent            Anesthesia Type: General    Tino Phase I: Tino Score: 8    Tino Phase II:      Anesthesia Post Evaluation    Patient location during evaluation: PACU  Patient participation: complete - patient participated  Level of consciousness: awake  Pain score: 0  Airway patency: patent  Nausea & Vomiting: no nausea and no vomiting  Cardiovascular status: hemodynamically stable  Respiratory status: acceptable, face mask and spontaneous ventilation  Hydration status: euvolemic  Multimodal analgesia pain management approach  Pain management: adequate        No notable events documented.

## 2025-03-04 VITALS
HEART RATE: 93 BPM | SYSTOLIC BLOOD PRESSURE: 119 MMHG | RESPIRATION RATE: 18 BRPM | DIASTOLIC BLOOD PRESSURE: 90 MMHG | OXYGEN SATURATION: 91 % | HEIGHT: 68 IN | TEMPERATURE: 98.2 F | BODY MASS INDEX: 38.16 KG/M2 | WEIGHT: 251.77 LBS

## 2025-03-04 PROBLEM — E87.6 HYPOKALEMIA: Status: RESOLVED | Noted: 2018-09-14 | Resolved: 2025-03-04

## 2025-03-04 LAB
ALBUMIN SERPL-MCNC: 4 G/DL (ref 3.5–5.2)
ALP SERPL-CCNC: 63 U/L (ref 35–104)
ALT SERPL-CCNC: 18 U/L (ref 0–32)
ANION GAP SERPL CALCULATED.3IONS-SCNC: 15 MMOL/L (ref 7–16)
AST SERPL-CCNC: 21 U/L (ref 0–31)
BASOPHILS # BLD: 0.01 K/UL (ref 0–0.2)
BASOPHILS NFR BLD: 0 % (ref 0–2)
BILIRUB DIRECT SERPL-MCNC: 0.3 MG/DL (ref 0–0.3)
BILIRUB INDIRECT SERPL-MCNC: 0.8 MG/DL (ref 0–1)
BILIRUB SERPL-MCNC: 1.1 MG/DL (ref 0–1.2)
BUN SERPL-MCNC: 8 MG/DL (ref 6–20)
CALCIUM SERPL-MCNC: 9.1 MG/DL (ref 8.6–10.2)
CHLORIDE SERPL-SCNC: 99 MMOL/L (ref 98–107)
CO2 SERPL-SCNC: 21 MMOL/L (ref 22–29)
CREAT SERPL-MCNC: 0.6 MG/DL (ref 0.5–1)
EOSINOPHIL # BLD: 0 K/UL (ref 0.05–0.5)
EOSINOPHILS RELATIVE PERCENT: 0 % (ref 0–6)
ERYTHROCYTE [DISTWIDTH] IN BLOOD BY AUTOMATED COUNT: 14.1 % (ref 11.5–15)
GFR, ESTIMATED: >90 ML/MIN/1.73M2
GLUCOSE SERPL-MCNC: 171 MG/DL (ref 74–99)
HCT VFR BLD AUTO: 43.5 % (ref 34–48)
HGB BLD-MCNC: 14.4 G/DL (ref 11.5–15.5)
IMM GRANULOCYTES # BLD AUTO: 0.05 K/UL (ref 0–0.58)
IMM GRANULOCYTES NFR BLD: 1 % (ref 0–5)
LYMPHOCYTES NFR BLD: 0.36 K/UL (ref 1.5–4)
LYMPHOCYTES RELATIVE PERCENT: 5 % (ref 20–42)
MCH RBC QN AUTO: 34.9 PG (ref 26–35)
MCHC RBC AUTO-ENTMCNC: 33.1 G/DL (ref 32–34.5)
MCV RBC AUTO: 105.3 FL (ref 80–99.9)
MONOCYTES NFR BLD: 0.11 K/UL (ref 0.1–0.95)
MONOCYTES NFR BLD: 2 % (ref 2–12)
NEUTROPHILS NFR BLD: 92 % (ref 43–80)
NEUTS SEG NFR BLD: 6.28 K/UL (ref 1.8–7.3)
PLATELET # BLD AUTO: 155 K/UL (ref 130–450)
PMV BLD AUTO: 10 FL (ref 7–12)
POTASSIUM SERPL-SCNC: 4.3 MMOL/L (ref 3.5–5)
PROT SERPL-MCNC: 6.9 G/DL (ref 6.4–8.3)
RBC # BLD AUTO: 4.13 M/UL (ref 3.5–5.5)
RBC # BLD: NORMAL 10*6/UL
SODIUM SERPL-SCNC: 135 MMOL/L (ref 132–146)
WBC OTHER # BLD: 6.8 K/UL (ref 4.5–11.5)

## 2025-03-04 PROCEDURE — 6360000002 HC RX W HCPCS: Performed by: STUDENT IN AN ORGANIZED HEALTH CARE EDUCATION/TRAINING PROGRAM

## 2025-03-04 PROCEDURE — 80053 COMPREHEN METABOLIC PANEL: CPT

## 2025-03-04 PROCEDURE — 82248 BILIRUBIN DIRECT: CPT

## 2025-03-04 PROCEDURE — 6360000002 HC RX W HCPCS

## 2025-03-04 PROCEDURE — 85025 COMPLETE CBC W/AUTO DIFF WBC: CPT

## 2025-03-04 PROCEDURE — 6370000000 HC RX 637 (ALT 250 FOR IP): Performed by: STUDENT IN AN ORGANIZED HEALTH CARE EDUCATION/TRAINING PROGRAM

## 2025-03-04 PROCEDURE — 2500000003 HC RX 250 WO HCPCS: Performed by: STUDENT IN AN ORGANIZED HEALTH CARE EDUCATION/TRAINING PROGRAM

## 2025-03-04 RX ORDER — BACITRACIN ZINC AND POLYMYXIN B SULFATE 500; 1000 [USP'U]/G; [USP'U]/G
OINTMENT TOPICAL
Qty: 14 G | Refills: 0 | Status: SHIPPED | OUTPATIENT
Start: 2025-03-04 | End: 2025-03-11

## 2025-03-04 RX ORDER — ONDANSETRON 4 MG/1
4 TABLET, ORALLY DISINTEGRATING ORAL EVERY 8 HOURS PRN
Qty: 15 TABLET | Refills: 0 | Status: SHIPPED | OUTPATIENT
Start: 2025-03-04 | End: 2025-03-09

## 2025-03-04 RX ORDER — HYDROCODONE BITARTRATE AND ACETAMINOPHEN 5; 325 MG/1; MG/1
1 TABLET ORAL EVERY 6 HOURS PRN
Qty: 20 TABLET | Refills: 0 | Status: SHIPPED | OUTPATIENT
Start: 2025-03-04 | End: 2025-03-09

## 2025-03-04 RX ADMIN — MORPHINE SULFATE 4 MG: 4 INJECTION, SOLUTION INTRAMUSCULAR; INTRAVENOUS at 04:28

## 2025-03-04 RX ADMIN — MORPHINE SULFATE 4 MG: 4 INJECTION, SOLUTION INTRAMUSCULAR; INTRAVENOUS at 01:18

## 2025-03-04 RX ADMIN — Medication 100 MG: at 08:57

## 2025-03-04 RX ADMIN — ONDANSETRON 4 MG: 2 INJECTION, SOLUTION INTRAMUSCULAR; INTRAVENOUS at 08:57

## 2025-03-04 RX ADMIN — DEXAMETHASONE SODIUM PHOSPHATE 10 MG: 10 INJECTION INTRAMUSCULAR; INTRAVENOUS at 01:18

## 2025-03-04 RX ADMIN — HYDROCODONE BITARTRATE AND ACETAMINOPHEN 1 TABLET: 5; 325 TABLET ORAL at 09:02

## 2025-03-04 RX ADMIN — LORAZEPAM 2 MG: 1 TABLET ORAL at 12:38

## 2025-03-04 RX ADMIN — PANTOPRAZOLE SODIUM 40 MG: 40 TABLET, DELAYED RELEASE ORAL at 05:47

## 2025-03-04 RX ADMIN — ENOXAPARIN SODIUM 30 MG: 100 INJECTION SUBCUTANEOUS at 08:57

## 2025-03-04 RX ADMIN — SODIUM CHLORIDE, PRESERVATIVE FREE 10 ML: 5 INJECTION INTRAVENOUS at 01:19

## 2025-03-04 RX ADMIN — LORAZEPAM 2 MG: 2 INJECTION INTRAMUSCULAR; INTRAVENOUS at 06:43

## 2025-03-04 RX ADMIN — METOPROLOL TARTRATE 100 MG: 50 TABLET, FILM COATED ORAL at 08:58

## 2025-03-04 RX ADMIN — DEXAMETHASONE SODIUM PHOSPHATE 10 MG: 10 INJECTION INTRAMUSCULAR; INTRAVENOUS at 09:01

## 2025-03-04 RX ADMIN — LORAZEPAM 2 MG: 1 TABLET ORAL at 09:02

## 2025-03-04 RX ADMIN — SODIUM CHLORIDE, PRESERVATIVE FREE 10 ML: 5 INJECTION INTRAVENOUS at 09:05

## 2025-03-04 ASSESSMENT — PAIN DESCRIPTION - LOCATION
LOCATION: EYE;FACE;NECK
LOCATION: EYE;FACE
LOCATION: EYE;NECK

## 2025-03-04 ASSESSMENT — PAIN SCALES - GENERAL
PAINLEVEL_OUTOF10: 6
PAINLEVEL_OUTOF10: 8
PAINLEVEL_OUTOF10: 6

## 2025-03-04 ASSESSMENT — PAIN DESCRIPTION - DESCRIPTORS
DESCRIPTORS: ACHING;DISCOMFORT;SORE

## 2025-03-04 ASSESSMENT — PAIN DESCRIPTION - ORIENTATION
ORIENTATION: RIGHT

## 2025-03-04 ASSESSMENT — PAIN DESCRIPTION - PAIN TYPE: TYPE: ACUTE PAIN;SURGICAL PAIN

## 2025-03-04 ASSESSMENT — PAIN DESCRIPTION - ONSET: ONSET: PROGRESSIVE

## 2025-03-04 ASSESSMENT — PAIN DESCRIPTION - FREQUENCY: FREQUENCY: CONTINUOUS

## 2025-03-04 NOTE — PLAN OF CARE
Problem: Discharge Planning  Goal: Discharge to home or other facility with appropriate resources  3/4/2025 0324 by Karyn Leong, RN  Outcome: Progressing  3/4/2025 0301 by Karyn Leong RN  Outcome: Progressing     Problem: Pain  Goal: Verbalizes/displays adequate comfort level or baseline comfort level  3/4/2025 0324 by Karyn Leong, RN  Outcome: Progressing  3/4/2025 0301 by Karyn Leong, RN  Outcome: Progressing     Problem: Safety - Adult  Goal: Free from fall injury  3/4/2025 0324 by Karyn Leong, RN  Outcome: Progressing  3/4/2025 0301 by Karyn Leong, RN  Outcome: Progressing

## 2025-03-04 NOTE — DISCHARGE SUMMARY
bacitracin-polymyxin b [id number] UNIT/GM ointment  HYDROcodone-acetaminophen 5-325 MG per tablet  ondansetron 4 MG disintegrating tablet       Activity: activity as tolerated  Diet: regular diet    Pt has been advised to:    Already has follow-up appt with ENT.  Has made appt with outpatient     29 minutes spent on discharge planning and coordination.      Signed:  Willem Norris MD  3/4/2025  12:08 PM

## 2025-03-04 NOTE — CARE COORDINATION
Updated plan of care. POD#1 right orbital ORIF. Pt can discharge home today. Declining peer recovery services. Support given. Plan is home with son. No needs-mjo

## 2025-03-04 NOTE — PLAN OF CARE
Problem: Discharge Planning  Goal: Discharge to home or other facility with appropriate resources  3/4/2025 1230 by Tina Sanchez RN  Outcome: Adequate for Discharge  3/4/2025 0324 by Karyn Leong RN  Outcome: Progressing  3/4/2025 0301 by Karyn Leong RN  Outcome: Progressing     Problem: Pain  Goal: Verbalizes/displays adequate comfort level or baseline comfort level  3/4/2025 1230 by Tina Sanchez RN  Outcome: Adequate for Discharge  3/4/2025 0324 by Karyn Leong RN  Outcome: Progressing  3/4/2025 0301 by Karyn Leong RN  Outcome: Progressing     Problem: Safety - Adult  Goal: Free from fall injury  3/4/2025 1230 by Tina Sanchez RN  Outcome: Adequate for Discharge  3/4/2025 0324 by Karyn Leong RN  Outcome: Progressing  3/4/2025 0301 by Karyn Leong RN  Outcome: Progressing     Problem: Neurosensory - Adult  Goal: Achieves stable or improved neurological status  Outcome: Adequate for Discharge  Goal: Absence of seizures  Outcome: Adequate for Discharge     Problem: Skin/Tissue Integrity - Adult  Goal: Skin integrity remains intact  Outcome: Adequate for Discharge     Problem: Musculoskeletal - Adult  Goal: Return mobility to safest level of function  Outcome: Adequate for Discharge

## 2025-03-04 NOTE — PLAN OF CARE
Problem: Neurosensory - Adult  Goal: Achieves stable or improved neurological status  3/4/2025 1231 by Tina Sanchez RN  Outcome: Completed  3/4/2025 1230 by Tina Sanchez RN  Outcome: Adequate for Discharge  Goal: Absence of seizures  3/4/2025 1231 by Tina Sanchez RN  Outcome: Completed  3/4/2025 1230 by Tina Sanchez RN  Outcome: Adequate for Discharge     Problem: Skin/Tissue Integrity - Adult  Goal: Skin integrity remains intact  3/4/2025 1231 by Tina Sanchez RN  Outcome: Completed  3/4/2025 1230 by Tina Sanchez RN  Outcome: Adequate for Discharge     Problem: Safety - Adult  Goal: Free from fall injury  3/4/2025 1231 by Tina Sanchez RN  Outcome: Completed  3/4/2025 1230 by Tina Sanchez RN  Outcome: Adequate for Discharge  3/4/2025 0324 by Karyn Lenog RN  Outcome: Progressing  3/4/2025 0301 by Karyn Leong RN  Outcome: Progressing     Problem: Musculoskeletal - Adult  Goal: Return mobility to safest level of function  3/4/2025 1231 by Tina Sanchez RN  Outcome: Completed  3/4/2025 1230 by Tina Sanchez RN  Outcome: Adequate for Discharge
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
## 2025-03-04 NOTE — DISCHARGE INSTR - DIET
Good nutrition is important when healing from an illness, injury, or surgery.  Follow any nutrition recommendations given to you during your hospital stay.   If you were given an oral nutrition supplement while in the hospital, continue to take this supplement at home.  You can take it with meals, in-between meals, and/or before bedtime. These supplements can be purchased at most local grocery stores, pharmacies, and chain [employer]-stores.   If you have any questions about your diet or nutrition, call the hospital and ask for the dietitian.    Regular diet.

## 2025-03-11 ENCOUNTER — OFFICE VISIT (OUTPATIENT)
Dept: ENT CLINIC | Age: 45
End: 2025-03-11
Payer: MEDICAID

## 2025-03-11 VITALS
OXYGEN SATURATION: 95 % | DIASTOLIC BLOOD PRESSURE: 70 MMHG | HEIGHT: 68 IN | BODY MASS INDEX: 37.1 KG/M2 | HEART RATE: 98 BPM | WEIGHT: 244.8 LBS | SYSTOLIC BLOOD PRESSURE: 95 MMHG

## 2025-03-11 DIAGNOSIS — J30.1 SEASONAL ALLERGIC RHINITIS DUE TO POLLEN: Primary | ICD-10-CM

## 2025-03-11 DIAGNOSIS — S02.30XA ORBITAL FLOOR (BLOW-OUT) CLOSED FRACTURE (HCC): ICD-10-CM

## 2025-03-11 DIAGNOSIS — J32.0 CHRONIC MAXILLARY SINUSITIS: ICD-10-CM

## 2025-03-11 PROCEDURE — 4004F PT TOBACCO SCREEN RCVD TLK: CPT | Performed by: OTOLARYNGOLOGY

## 2025-03-11 PROCEDURE — G8427 DOCREV CUR MEDS BY ELIG CLIN: HCPCS | Performed by: OTOLARYNGOLOGY

## 2025-03-11 PROCEDURE — 3078F DIAST BP <80 MM HG: CPT | Performed by: OTOLARYNGOLOGY

## 2025-03-11 PROCEDURE — 1111F DSCHRG MED/CURRENT MED MERGE: CPT | Performed by: OTOLARYNGOLOGY

## 2025-03-11 PROCEDURE — 99213 OFFICE O/P EST LOW 20 MIN: CPT | Performed by: OTOLARYNGOLOGY

## 2025-03-11 PROCEDURE — 3074F SYST BP LT 130 MM HG: CPT | Performed by: OTOLARYNGOLOGY

## 2025-03-11 PROCEDURE — G8417 CALC BMI ABV UP PARAM F/U: HCPCS | Performed by: OTOLARYNGOLOGY

## 2025-03-11 RX ORDER — AZELASTINE 1 MG/ML
1 SPRAY, METERED NASAL 2 TIMES DAILY
Qty: 30 ML | Refills: 3 | Status: SHIPPED | OUTPATIENT
Start: 2025-03-11

## 2025-03-11 RX ORDER — HYDROCODONE BITARTRATE AND ACETAMINOPHEN 5; 325 MG/1; MG/1
TABLET ORAL
COMMUNITY

## 2025-03-11 ASSESSMENT — ENCOUNTER SYMPTOMS
GASTROINTESTINAL NEGATIVE: 1
VOMITING: 0
ABDOMINAL PAIN: 0
COLOR CHANGE: 0
SHORTNESS OF BREATH: 0
EYE DISCHARGE: 0
CHEST TIGHTNESS: 0
DIARRHEA: 0
EYE PAIN: 0
APNEA: 0
RESPIRATORY NEGATIVE: 1
EYE REDNESS: 1
PHOTOPHOBIA: 0

## 2025-03-11 NOTE — PROGRESS NOTES
redness and visual disturbance. Negative for photophobia, pain and discharge.   Respiratory: Negative.  Negative for apnea, chest tightness and shortness of breath.    Cardiovascular: Negative.  Negative for chest pain, palpitations and leg swelling.   Gastrointestinal: Negative.  Negative for abdominal pain, diarrhea and vomiting.   Endocrine: Negative for cold intolerance, heat intolerance and polydipsia.   Genitourinary: Negative.  Negative for dysuria, flank pain and hematuria.   Musculoskeletal: Negative.  Negative for arthralgias, gait problem and neck pain.   Skin: Negative.  Negative for color change, pallor and rash.   Allergic/Immunologic: Negative for environmental allergies, food allergies and immunocompromised state.   Neurological: Negative.  Negative for dizziness, numbness and headaches.   Hematological:  Negative for adenopathy.   Psychiatric/Behavioral: Negative.  Negative for behavioral problems and hallucinations.    All other systems reviewed and are negative.              Objective:   Physical Exam  Vitals and nursing note reviewed.   Constitutional:       Appearance: She is well-developed.   HENT:      Head: Normocephalic and atraumatic.      Right Ear: Hearing, tympanic membrane, ear canal and external ear normal.      Left Ear: Hearing, tympanic membrane, ear canal and external ear normal.      Nose: Mucosal edema and rhinorrhea present.      Mouth/Throat:      Pharynx: Uvula midline.   Eyes:      Extraocular Movements:      Right eye: Abnormal extraocular motion present.      Conjunctiva/sclera:      Right eye: Right conjunctiva is injected. Chemosis and hemorrhage present.      Pupils: Pupils are equal, round, and reactive to light.     Cardiovascular:      Rate and Rhythm: Normal rate and regular rhythm.      Heart sounds: Normal heart sounds.   Pulmonary:      Effort: Pulmonary effort is normal. No respiratory distress.      Breath sounds: Normal breath sounds.   Abdominal:      General:

## 2025-03-17 ENCOUNTER — HOSPITAL ENCOUNTER (EMERGENCY)
Age: 45
Discharge: LEFT BEFORE DISCHARGE | End: 2025-03-17
Attending: STUDENT IN AN ORGANIZED HEALTH CARE EDUCATION/TRAINING PROGRAM
Payer: MEDICAID

## 2025-03-17 ENCOUNTER — APPOINTMENT (OUTPATIENT)
Dept: CT IMAGING | Age: 45
End: 2025-03-17
Payer: MEDICAID

## 2025-03-17 VITALS
HEART RATE: 99 BPM | RESPIRATION RATE: 16 BRPM | DIASTOLIC BLOOD PRESSURE: 66 MMHG | SYSTOLIC BLOOD PRESSURE: 103 MMHG | TEMPERATURE: 97.4 F | OXYGEN SATURATION: 98 %

## 2025-03-17 DIAGNOSIS — F10.920 ACUTE ALCOHOLIC INTOXICATION WITHOUT COMPLICATION: ICD-10-CM

## 2025-03-17 DIAGNOSIS — G89.18 POST-OP PAIN: Primary | ICD-10-CM

## 2025-03-17 DIAGNOSIS — Z87.81 HISTORY OF FACIAL FRACTURE: ICD-10-CM

## 2025-03-17 DIAGNOSIS — H11.31 SUBCONJUNCTIVAL HEMORRHAGE OF RIGHT EYE: ICD-10-CM

## 2025-03-17 LAB
ALBUMIN SERPL-MCNC: 4.3 G/DL (ref 3.5–5.2)
ALP SERPL-CCNC: 60 U/L (ref 35–104)
ALT SERPL-CCNC: 20 U/L (ref 0–32)
ANION GAP SERPL CALCULATED.3IONS-SCNC: 16 MMOL/L (ref 7–16)
APAP SERPL-MCNC: <5 UG/ML (ref 10–30)
AST SERPL-CCNC: 27 U/L (ref 0–31)
BASOPHILS # BLD: 0.04 K/UL (ref 0–0.2)
BASOPHILS NFR BLD: 1 % (ref 0–2)
BILIRUB SERPL-MCNC: 0.5 MG/DL (ref 0–1.2)
BUN SERPL-MCNC: 11 MG/DL (ref 6–20)
CALCIUM SERPL-MCNC: 8.6 MG/DL (ref 8.6–10.2)
CHLORIDE SERPL-SCNC: 107 MMOL/L (ref 98–107)
CO2 SERPL-SCNC: 25 MMOL/L (ref 22–29)
CREAT SERPL-MCNC: 0.8 MG/DL (ref 0.5–1)
EOSINOPHIL # BLD: 0.04 K/UL (ref 0.05–0.5)
EOSINOPHILS RELATIVE PERCENT: 1 % (ref 0–6)
ERYTHROCYTE [DISTWIDTH] IN BLOOD BY AUTOMATED COUNT: 14.2 % (ref 11.5–15)
ETHANOLAMINE SERPL-MCNC: 433 MG/DL (ref 0–0.08)
GFR, ESTIMATED: >90 ML/MIN/1.73M2
GLUCOSE SERPL-MCNC: 88 MG/DL (ref 74–99)
HCG, URINE, POC: NEGATIVE
HCT VFR BLD AUTO: 48.4 % (ref 34–48)
HGB BLD-MCNC: 16.4 G/DL (ref 11.5–15.5)
IMM GRANULOCYTES # BLD AUTO: 0.07 K/UL (ref 0–0.58)
IMM GRANULOCYTES NFR BLD: 1 % (ref 0–5)
LYMPHOCYTES NFR BLD: 2.33 K/UL (ref 1.5–4)
LYMPHOCYTES RELATIVE PERCENT: 35 % (ref 20–42)
Lab: NORMAL
MCH RBC QN AUTO: 35.3 PG (ref 26–35)
MCHC RBC AUTO-ENTMCNC: 33.9 G/DL (ref 32–34.5)
MCV RBC AUTO: 104.1 FL (ref 80–99.9)
MONOCYTES NFR BLD: 0.35 K/UL (ref 0.1–0.95)
MONOCYTES NFR BLD: 5 % (ref 2–12)
NEGATIVE QC PASS/FAIL: NORMAL
NEUTROPHILS NFR BLD: 58 % (ref 43–80)
NEUTS SEG NFR BLD: 3.89 K/UL (ref 1.8–7.3)
PLATELET # BLD AUTO: 194 K/UL (ref 130–450)
PMV BLD AUTO: 9.4 FL (ref 7–12)
POSITIVE QC PASS/FAIL: NORMAL
POTASSIUM SERPL-SCNC: 3.7 MMOL/L (ref 3.5–5)
PROT SERPL-MCNC: 7.7 G/DL (ref 6.4–8.3)
RBC # BLD AUTO: 4.65 M/UL (ref 3.5–5.5)
SALICYLATES SERPL-MCNC: <0.3 MG/DL (ref 0–30)
SODIUM SERPL-SCNC: 148 MMOL/L (ref 132–146)
TOXIC TRICYCLIC SC,BLOOD: NEGATIVE
WBC OTHER # BLD: 6.7 K/UL (ref 4.5–11.5)

## 2025-03-17 PROCEDURE — 80143 DRUG ASSAY ACETAMINOPHEN: CPT

## 2025-03-17 PROCEDURE — 99285 EMERGENCY DEPT VISIT HI MDM: CPT

## 2025-03-17 PROCEDURE — 6360000004 HC RX CONTRAST MEDICATION: Performed by: RADIOLOGY

## 2025-03-17 PROCEDURE — 96361 HYDRATE IV INFUSION ADD-ON: CPT

## 2025-03-17 PROCEDURE — 96372 THER/PROPH/DIAG INJ SC/IM: CPT

## 2025-03-17 PROCEDURE — 96360 HYDRATION IV INFUSION INIT: CPT

## 2025-03-17 PROCEDURE — G0480 DRUG TEST DEF 1-7 CLASSES: HCPCS

## 2025-03-17 PROCEDURE — 6370000000 HC RX 637 (ALT 250 FOR IP): Performed by: STUDENT IN AN ORGANIZED HEALTH CARE EDUCATION/TRAINING PROGRAM

## 2025-03-17 PROCEDURE — 85025 COMPLETE CBC W/AUTO DIFF WBC: CPT

## 2025-03-17 PROCEDURE — 70487 CT MAXILLOFACIAL W/DYE: CPT

## 2025-03-17 PROCEDURE — 80307 DRUG TEST PRSMV CHEM ANLYZR: CPT

## 2025-03-17 PROCEDURE — 80053 COMPREHEN METABOLIC PANEL: CPT

## 2025-03-17 PROCEDURE — 6360000002 HC RX W HCPCS: Performed by: STUDENT IN AN ORGANIZED HEALTH CARE EDUCATION/TRAINING PROGRAM

## 2025-03-17 PROCEDURE — 2580000003 HC RX 258: Performed by: STUDENT IN AN ORGANIZED HEALTH CARE EDUCATION/TRAINING PROGRAM

## 2025-03-17 PROCEDURE — 80179 DRUG ASSAY SALICYLATE: CPT

## 2025-03-17 RX ORDER — ORPHENADRINE CITRATE 30 MG/ML
60 INJECTION INTRAMUSCULAR; INTRAVENOUS ONCE
Status: COMPLETED | OUTPATIENT
Start: 2025-03-17 | End: 2025-03-17

## 2025-03-17 RX ORDER — METOPROLOL TARTRATE 50 MG
100 TABLET ORAL ONCE
Status: COMPLETED | OUTPATIENT
Start: 2025-03-17 | End: 2025-03-17

## 2025-03-17 RX ORDER — 0.9 % SODIUM CHLORIDE 0.9 %
1000 INTRAVENOUS SOLUTION INTRAVENOUS ONCE
Status: COMPLETED | OUTPATIENT
Start: 2025-03-17 | End: 2025-03-17

## 2025-03-17 RX ORDER — ACETAMINOPHEN 500 MG
1000 TABLET ORAL ONCE
Status: COMPLETED | OUTPATIENT
Start: 2025-03-17 | End: 2025-03-17

## 2025-03-17 RX ORDER — IOPAMIDOL 755 MG/ML
75 INJECTION, SOLUTION INTRAVASCULAR
Status: COMPLETED | OUTPATIENT
Start: 2025-03-17 | End: 2025-03-17

## 2025-03-17 RX ADMIN — ACETAMINOPHEN 1000 MG: 500 TABLET ORAL at 12:54

## 2025-03-17 RX ADMIN — METOPROLOL TARTRATE 100 MG: 50 TABLET, FILM COATED ORAL at 17:54

## 2025-03-17 RX ADMIN — IOPAMIDOL 75 ML: 755 INJECTION, SOLUTION INTRAVENOUS at 13:44

## 2025-03-17 RX ADMIN — SODIUM CHLORIDE 1000 ML: 0.9 INJECTION, SOLUTION INTRAVENOUS at 12:52

## 2025-03-17 RX ADMIN — ORPHENADRINE CITRATE 60 MG: 60 INJECTION INTRAMUSCULAR; INTRAVENOUS at 12:55

## 2025-03-17 ASSESSMENT — ENCOUNTER SYMPTOMS
NAUSEA: 0
SHORTNESS OF BREATH: 0
VOMITING: 0
COUGH: 0
FACIAL SWELLING: 1
DIARRHEA: 0
ABDOMINAL PAIN: 0

## 2025-03-17 ASSESSMENT — PAIN SCALES - GENERAL
PAINLEVEL_OUTOF10: 7
PAINLEVEL_OUTOF10: 6

## 2025-03-17 ASSESSMENT — LIFESTYLE VARIABLES
HOW MANY STANDARD DRINKS CONTAINING ALCOHOL DO YOU HAVE ON A TYPICAL DAY: PATIENT DOES NOT DRINK
HOW OFTEN DO YOU HAVE A DRINK CONTAINING ALCOHOL: NEVER

## 2025-03-17 ASSESSMENT — PAIN - FUNCTIONAL ASSESSMENT: PAIN_FUNCTIONAL_ASSESSMENT: 0-10

## 2025-03-17 NOTE — CARE COORDINATION
Social Work/Transition of Care:    SW met with pt introduced self and role, Pt presented to the ED due to Facial pain after a fall.  +ETOH.   ED PCP has medically cleared pt and pt can be discharged with a sober ride, SW provided pt with several beverages and pt is attempting to call family/friends for a sober ride.    Electronically signed by DARRION regalado on 3/17/2025 at 6:09 PM

## 2025-03-17 NOTE — ED PROVIDER NOTES
deficiency.     EMERGENCY DEPARTMENT COURSE    Vitals:    Vitals:    03/17/25 1122 03/17/25 1432 03/17/25 1722   BP: 111/72 104/88 103/66   Pulse: 77 88 99   Resp: 14 14 16   Temp: 97.4 °F (36.3 °C)     TempSrc: Oral     SpO2: 98% 96% 98%       Patient was given the following medications:  Medications   orphenadrine (NORFLEX) injection 60 mg (60 mg IntraMUSCular Given 3/17/25 1255)   acetaminophen (TYLENOL) tablet 1,000 mg (1,000 mg Oral Given 3/17/25 1254)   sodium chloride 0.9 % bolus 1,000 mL (0 mLs IntraVENous Stopped 3/17/25 1425)   iopamidol (ISOVUE-370) 76 % injection 75 mL (75 mLs IntraVENous Given 3/17/25 1344)   metoprolol tartrate (LOPRESSOR) tablet 100 mg (100 mg Oral Given 3/17/25 1754)           Is this patient to be included in the SEP-1 core measure? No Exclusion criteria - the patient is NOT to be included for SEP-1 Core Measure due to: Infection is not suspected          Medical Decision Making/Differential Diagnosis:    CC/HPI Summary, Social Determinants of health, Records Reviewed, DDx, testing done/not done, ED Course, Reassessment, disposition considerations/shared decision making with patient, consults, disposition:          [facility]    ED Course as of 03/17/25 2356   Mon Mar 17, 2025   1831 I clearly verbalized to the patient that we would either need to wait for her to be clinically sober or for her to have a sober ride home.  She was a bit frustrated but agreeable.  She just wanted her home metoprolol to be ordered which I did do.  The patient is not suicidal or homicidal, she is alert and oriented.  RN just notified me that the patient was seen walking out of the department.  We would likely be calling PD. [VG]      ED Course User Index  [VG] Stephanie Snell, DO        Medical Decision Making  Amount and/or Complexity of Data Reviewed  Labs: ordered.  Radiology: ordered.    Risk  OTC drugs.  Prescription drug management.        This is a 44 y.o. female presenting for evaluation of facial  medical decision making and emergency management    This patient has remained hemodynamically stable during their ED course.    Counseling:   The emergency provider has spoken with the patient and discussed today’s results, in addition to providing specific details for the plan of care and counseling regarding the diagnosis and prognosis.  Questions are answered at this time and they are agreeable with the plan.    CONSULTS:   None       Please see ED course for any additional MDM documentation.       CRITICAL CARE TIME (.cct)     0 minutes            I am the Primary Clinician of Record.    FINAL IMPRESSION      1. Post-op pain    2. History of facial fracture    3. Subconjunctival hemorrhage of right eye    4. Acute alcoholic intoxication without complication          DISPOSITION/PLAN     DISPOSITION Eloped - Left Before Treatment Complete 03/17/2025 06:41:19 PM    Disposition: Eloped  Patient condition is unknown      PATIENT REFERRED TO:  No follow-up provider specified.    DISCHARGE MEDICATIONS:  Discharge Medication List as of 3/17/2025  6:42 PM          DISCONTINUED MEDICATIONS:  Discharge Medication List as of 3/17/2025  6:42 PM                      Stephanie Snell D.O.     Emergency Medicine      3/17/2025 11:56 PM      NOTE: This report was transcribed using voice recognition software. Every effort was made to ensure accuracy; however, inadvertent computerized transcription errors may be present           Stephanie Snell DO  03/17/25 6144

## 2025-03-17 NOTE — ED NOTES
RN saw patient walking out front door. RN called after patient patient continued to walk out front door. Charge RN called to inform that patient was no longer in there room. RN went outside to locate patient. Per bystander patient got into car with someone and left. Charge RN notified.

## 2025-03-17 NOTE — ED NOTES
Called patient regarding IV still being in her arm. She states that she took it out at home and that she is there with family members who will be with her for the evening.

## 2025-05-02 ENCOUNTER — APPOINTMENT (OUTPATIENT)
Dept: CT IMAGING | Age: 45
End: 2025-05-02
Payer: MEDICAID

## 2025-05-02 ENCOUNTER — APPOINTMENT (OUTPATIENT)
Dept: GENERAL RADIOLOGY | Age: 45
End: 2025-05-02
Payer: MEDICAID

## 2025-05-02 ENCOUNTER — HOSPITAL ENCOUNTER (EMERGENCY)
Age: 45
Discharge: HOME | End: 2025-05-02
Attending: STUDENT IN AN ORGANIZED HEALTH CARE EDUCATION/TRAINING PROGRAM
Payer: MEDICAID

## 2025-05-02 VITALS
OXYGEN SATURATION: 100 % | BODY MASS INDEX: 37.22 KG/M2 | WEIGHT: 244.8 LBS | SYSTOLIC BLOOD PRESSURE: 118 MMHG | DIASTOLIC BLOOD PRESSURE: 90 MMHG | TEMPERATURE: 97.4 F | RESPIRATION RATE: 17 BRPM | HEART RATE: 85 BPM
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
## 2025-05-02 DIAGNOSIS — M79.605 LEFT LEG PAIN: ICD-10-CM

## 2025-05-02 DIAGNOSIS — F10.10 ETOH ABUSE: Primary | ICD-10-CM

## 2025-05-02 LAB
HCG, URINE, POC: NEGATIVE
Lab: NORMAL
NEGATIVE QC PASS/FAIL: NORMAL
POSITIVE QC PASS/FAIL: NORMAL

## 2025-05-02 PROCEDURE — 99284 EMERGENCY DEPT VISIT MOD MDM: CPT

## 2025-05-02 PROCEDURE — 6370000000 HC RX 637 (ALT 250 FOR IP): Performed by: STUDENT IN AN ORGANIZED HEALTH CARE EDUCATION/TRAINING PROGRAM

## 2025-05-02 PROCEDURE — 72125 CT NECK SPINE W/O DYE: CPT

## 2025-05-02 PROCEDURE — 73552 X-RAY EXAM OF FEMUR 2/>: CPT

## 2025-05-02 PROCEDURE — 70450 CT HEAD/BRAIN W/O DYE: CPT

## 2025-05-02 PROCEDURE — 73502 X-RAY EXAM HIP UNI 2-3 VIEWS: CPT

## 2025-05-02 RX ORDER — CYCLOBENZAPRINE HCL 10 MG
10 TABLET ORAL ONCE
Status: COMPLETED | OUTPATIENT
Start: 2025-05-02 | End: 2025-05-02

## 2025-05-02 RX ORDER — ACETAMINOPHEN 325 MG/1
650 TABLET ORAL ONCE
Status: COMPLETED | OUTPATIENT
Start: 2025-05-02 | End: 2025-05-02

## 2025-05-02 RX ADMIN — CYCLOBENZAPRINE 10 MG: 10 TABLET, FILM COATED ORAL at 21:39

## 2025-05-02 RX ADMIN — ACETAMINOPHEN 650 MG: 325 TABLET ORAL at 21:39

## 2025-05-02 ASSESSMENT — PAIN SCALES - GENERAL
PAINLEVEL_OUTOF10: 10
PAINLEVEL_OUTOF10: 6

## 2025-05-02 ASSESSMENT — PAIN DESCRIPTION - LOCATION: LOCATION: HIP;LEG

## 2025-05-02 ASSESSMENT — PAIN DESCRIPTION - ORIENTATION: ORIENTATION: LEFT

## 2025-05-02 ASSESSMENT — PAIN - FUNCTIONAL ASSESSMENT: PAIN_FUNCTIONAL_ASSESSMENT: 0-10

## 2025-05-02 NOTE — ED PROVIDER NOTES
Department of Emergency Medicine   ED  Provider Note  Admit Date/RoomTime: 5/2/2025  6:01 PM  ED Room: 19/19              [facility]     Katie Camara is a 44 y.o. female with a PMHx significant for  anxiety, depression, GERD, EtOH use  who presents for evaluation of left-sided hip, femur pain, beginning prior to arrival. The complaint has been persistent, moderate in severity, and worsened by nothing.   The patient states that about 2 hours prior to arrival she was in the kitchen doing dishes when she slipped on water landing on her left hip.  Notes she has been having hard time getting back up.  States she did not hit her head, no loss of consciousness.  Notes that today's been the best day, she was arrested for DUI earlier today, does have history of alcohol use.  Notes that she was an upset and drinks 750 mL of vodka.  She currently is in no acute distress.  She is awake, alert, oriented answering questions appropriately.  Notes pain to her left femur.  Review of Systems     Physical Exam     Procedures    MDM             Differential diagnosis: ***  Review of ED/ Outpatient Records: ***  Historians that case was discussed with: None ***  My EKG interpretation: See ED course  Imaging Non-plain film images such as CT, Ultrasound and MRI are read by the radiologist. Plain radiographic images are visualized and preliminarily interpreted by the ED provider: {cxr:59300::\"none\"}  Discussed with other providers: {Discussed:49514::\"none\"}  Tests Considered but not ordered: None  Decision making tools/risks stratification / MDM / Independent Interpretation of labs: Katie Camara presents to the ED for ***.  History from{history:96118::\"patient / EMR\"} , with {limit:87814::\"no\"} limitations. Workup in the ED revealed  ***  Social Determinants of health impacting treatment or disposition: {limitations:41275::\"none\"}  CODE status and Discussions: none

## 2025-05-03 NOTE — ED PROVIDER NOTES
Patient signed out to me.  Pending femur x-ray.  X-ray read as normal.  She has a sober ride here plan to discharge.     James Arana MD  05/02/25 8270

## 2025-05-03 NOTE — DISCHARGE INSTR - COC
Continuity of Care Form    Patient Name: Katie Camara   :  1980  MRN:  [id number]    Admit date:  2025  Discharge date:  ***    Code Status Order: Prior   Advance Directives:     Admitting Physician:  No admitting provider for patient encounter.  PCP: Yaw Sesay DO    Discharging Nurse: ***  Discharging Hospital Unit/Room#:   Discharging Unit Phone Number: ***    Emergency Contact:   Extended Emergency Contact Information  Primary Emergency Contact: Ned Uriarte  Address: [address] [place] [address]  Home Phone: [phone]  Mobile Phone: [phone]  Relation: Child   needed? No  Secondary Emergency Contact: Luis M Adames  Home Phone: [phone]  Relation: Brother/Sister    Past Surgical History:  Past Surgical History:   Procedure Laterality Date    BREAST LUMPECTOMY Right     Benign    COLONOSCOPY  2011    Sayed Yossef-normal    ORBITAL FRACTURE SURGERY Right 3/3/2025    RIGHT ORBITAL FRACTURE OPEN REDUCTION INTERNAL FIXATION performed by Familia [facility] DO at [facility] OR    PILONIDAL CYST EXCISION      St     TONSILLECTOMY      TRANSESOPHAGEAL ECHOCARDIOGRAM      showed MVP    UPPER GASTROINTESTINAL ENDOSCOPY N/A 2018    EGD BIOPSY performed by Scout Morris MD at [facility] ENDOSCOPY       Immunization History:   Immunization History   Administered Date(s) Administered    Influenza, FLUARIX, FLULAVAL, FLUZONE (age 6 mo+) and AFLURIA, (age 3 y+), Quadv PF, 0.5mL 2016    PPD Test 2016, 2017, 2017    TDaP, ADACEL (age 10y-64y), BOOSTRIX (age 10y+), IM, 0.5mL 2016, 10/14/2021       Active Problems:  Patient Active Problem List   Diagnosis Code    Tobacco abuse Z72.0    Anxiety and depression F41.9, F32.A    Sinus tachycardia R00.0    Morbid obesity with BMI of 40.0-44.9, adult (HCC) E66.01, Z68.41    Alcohol abuse F10.10    GERD (gastroesophageal reflux disease) K21.9       Colostomy/Ileostomy/Ileal Conduit: {YES / NO:14588}       Date of Last BM: ***  No intake or output data in the 24 hours ending 25 2221  No intake/output data recorded.    Safety Concerns:     { TYSHAWN Safety Concerns:906525797}    Impairments/Disabilities:      { TYSHAWN Impairments/Disabilities:430650749}    Nutrition Therapy:  Current Nutrition Therapy:   { TYSHAWN Diet List:564096245}    Routes of Feeding: {[facility] DME Other Feedings:447767721}  Liquids: {Slp liquid thickness:24177}  Daily Fluid Restriction: {[facility] DME Yes amt example:686650365}  Last Modified Barium Swallow with Video (Video Swallowing Test): {Done Not Done Date:347722997}    Treatments at the Time of Hospital Discharge:   Respiratory Treatments: ***  Oxygen Therapy:  {Therapy; copd oxygen:89316}  Ventilator:    { CC Vent List:733184757}    Rehab Therapies: {THERAPEUTIC INTERVENTION:1438590851}  Weight Bearing Status/Restrictions: {[facility] Weight Bearin}  Other Medical Equipment (for information only, NOT a DME order):  {EQUIPMENT:417052652}  Other Treatments: ***    Patient's personal belongings (please select all that are sent with patient):  {[facility] DME Belongings:041596433}    RN SIGNATURE:  {Esignature:937937690}    CASE MANAGEMENT/SOCIAL WORK SECTION    Inpatient Status Date: ***    Readmission Risk Assessment Score:  [facility] RISK OF UNPLANNED READMISSION 2.0             0 Total Score        Discharging to Facility/ Agency   Name:   Address:  Phone:  Fax:    Dialysis Facility (if applicable)   Name:  Address:  Dialysis Schedule:  Phone:  Fax:    / signature: {Esignature:389219334}    PHYSICIAN SECTION    Prognosis: {Prognosis:2191997878}    Condition at Discharge: { Patient Condition:836677245}    Rehab Potential (if transferring to Rehab): {Prognosis:0091402825}    Recommended Labs or Other Treatments After Discharge: ***    Physician Certification: I certify the above information and transfer of Katie MCINTYRE

## 2025-05-12 ASSESSMENT — ENCOUNTER SYMPTOMS
SHORTNESS OF BREATH: 0
BACK PAIN: 1
SORE THROAT: 0
ABDOMINAL DISTENTION: 0
EYE REDNESS: 0
DIARRHEA: 0
EYE DISCHARGE: 0
SINUS PRESSURE: 0
WHEEZING: 0
VOMITING: 0
COUGH: 0
NAUSEA: 0
EYE PAIN: 0

## 2025-05-13 ENCOUNTER — PROCEDURE VISIT (OUTPATIENT)
Dept: AUDIOLOGY | Age: 45
End: 2025-05-13
Payer: MEDICAID

## 2025-05-13 ENCOUNTER — OFFICE VISIT (OUTPATIENT)
Dept: ENT CLINIC | Age: 45
End: 2025-05-13
Payer: MEDICAID

## 2025-05-13 VITALS
DIASTOLIC BLOOD PRESSURE: 81 MMHG | HEIGHT: 68 IN | OXYGEN SATURATION: 96 % | TEMPERATURE: 98.6 F | BODY MASS INDEX: 38.95 KG/M2 | SYSTOLIC BLOOD PRESSURE: 121 MMHG | HEART RATE: 101 BPM | WEIGHT: 257 LBS

## 2025-05-13 DIAGNOSIS — S02.30XA ORBITAL FLOOR (BLOW-OUT) CLOSED FRACTURE (HCC): Primary | ICD-10-CM

## 2025-05-13 DIAGNOSIS — H69.93 DYSFUNCTION OF BOTH EUSTACHIAN TUBES: Primary | ICD-10-CM

## 2025-05-13 PROCEDURE — G8417 CALC BMI ABV UP PARAM F/U: HCPCS | Performed by: OTOLARYNGOLOGY

## 2025-05-13 PROCEDURE — 4004F PT TOBACCO SCREEN RCVD TLK: CPT | Performed by: OTOLARYNGOLOGY

## 2025-05-13 PROCEDURE — 92567 TYMPANOMETRY: CPT

## 2025-05-13 PROCEDURE — 3079F DIAST BP 80-89 MM HG: CPT | Performed by: OTOLARYNGOLOGY

## 2025-05-13 PROCEDURE — 3074F SYST BP LT 130 MM HG: CPT | Performed by: OTOLARYNGOLOGY

## 2025-05-13 PROCEDURE — 99213 OFFICE O/P EST LOW 20 MIN: CPT | Performed by: OTOLARYNGOLOGY

## 2025-05-13 PROCEDURE — G8427 DOCREV CUR MEDS BY ELIG CLIN: HCPCS | Performed by: OTOLARYNGOLOGY

## 2025-05-13 RX ORDER — AZELASTINE 1 MG/ML
1 SPRAY, METERED NASAL 2 TIMES DAILY
Qty: 30 ML | Refills: 5 | Status: SHIPPED | OUTPATIENT
Start: 2025-05-13

## 2025-05-13 RX ORDER — FLUTICASONE PROPIONATE 50 MCG
2 SPRAY, SUSPENSION (ML) NASAL DAILY
Qty: 16 G | Refills: 5 | Status: SHIPPED | OUTPATIENT
Start: 2025-05-13

## 2025-05-13 ASSESSMENT — ENCOUNTER SYMPTOMS
EYE REDNESS: 1
ABDOMINAL PAIN: 0
APNEA: 0
CHEST TIGHTNESS: 0
EYE DISCHARGE: 0
GASTROINTESTINAL NEGATIVE: 1
COLOR CHANGE: 0
EYE PAIN: 0
SHORTNESS OF BREATH: 0
RESPIRATORY NEGATIVE: 1
DIARRHEA: 0
PHOTOPHOBIA: 0
VOMITING: 0

## 2025-05-13 NOTE — PROGRESS NOTES
Subjective:      Patient ID:  Katie Camara is a 44 y.o. female.    HPI:    Pt returns for check of right eye s/p ORIF orbital floor fracture. Doing well. No complaints. Cleared by optho.     From a chronic sinus standpoint she is using claritin, flonase and astelin with good relief.     Past Medical History:   Diagnosis Date    Acute alcohol intoxication     Acute bronchitis     Alcohol abuse     Anxiety and depression     Benzodiazepine abuse (HCC)     Breast lump in female     Right    Corneal abrasion     Ear ache     Elevated glucose level 11/3/2016    Elevated liver enzymes     Fatty liver 11/23/2016    From ultrasound of 11/23/16    Fatty liver disease, nonalcoholic 11/23/2016    Gastroenteritis     GERD (gastroesophageal reflux disease) 2010    Heart palpitations     History of stress test 02/08/2018    Hypokalemia     Irregular heartbeat     Migraine     Migraines     With Menstruation cycles    Miscarriage 2012    Morbid obesity with BMI of 40.0-44.9, adult (HCC)     MRSA infection of labia 2011    MVP (mitral valve prolapse) 2004    per DILIA    Panic attacks     PCOS (polycystic ovarian syndrome) 2008    Pilonidal cyst     Rapid heart beat     Sinus tachycardia     SOBOE (shortness of breath on exertion)     Tobacco abuse     Vitamin D deficiency      Past Surgical History:   Procedure Laterality Date    BREAST LUMPECTOMY Right 1995    Benign    COLONOSCOPY  11/02/2011    Sayed Yossef-normal    ORBITAL FRACTURE SURGERY Right 3/3/2025    RIGHT ORBITAL FRACTURE OPEN REDUCTION INTERNAL FIXATION performed by Familia Gandhi DO at [facility] OR    PILONIDAL CYST EXCISION  2000    St     TONSILLECTOMY  1991    TRANSESOPHAGEAL ECHOCARDIOGRAM  2004    showed MVP    UPPER GASTROINTESTINAL ENDOSCOPY N/A 12/28/2018    EGD BIOPSY performed by Scout Morris MD at [facility] ENDOSCOPY     Family History   Problem Relation Age of Onset    Heart Attack Father 55    Cancer Father 63        Lung    Heart Attack

## 2025-05-14 NOTE — PROGRESS NOTES
This patient was referred for tympanometric testing by Dr. Gandhi due to ear fullness.     Tympanometry revealed normal middle ear peak pressure and compliance, bilaterally.    The results were reviewed with the patient and ordering provider.     Recommendations for follow up will be made pending ordering provider consult.    Jere Prather/CCC-HIRAM  [place] Lic [id number]  Electronically signed by Jere Prather on 5/14/2025 at 8:10 AM

## 2025-06-09 ENCOUNTER — HOSPITAL ENCOUNTER (OUTPATIENT)
Age: 45
Setting detail: OBSERVATION
Discharge: HOME | End: 2025-06-11
Attending: STUDENT IN AN ORGANIZED HEALTH CARE EDUCATION/TRAINING PROGRAM | Admitting: INTERNAL MEDICINE
Payer: MEDICAID

## 2025-06-09 DIAGNOSIS — F10.920 ACUTE ALCOHOLIC INTOXICATION WITHOUT COMPLICATION: Primary | ICD-10-CM

## 2025-06-09 LAB
ALBUMIN SERPL-MCNC: 4.3 G/DL (ref 3.5–5.2)
ALP SERPL-CCNC: 78 U/L (ref 35–104)
ALT SERPL-CCNC: 14 U/L (ref 0–32)
AMPHET UR QL SCN: NEGATIVE
ANION GAP SERPL CALCULATED.3IONS-SCNC: 18 MMOL/L (ref 7–16)
AST SERPL-CCNC: 27 U/L (ref 0–31)
BARBITURATES UR QL SCN: NEGATIVE
BASOPHILS # BLD: 0.04 K/UL (ref 0–0.2)
BASOPHILS NFR BLD: 1 % (ref 0–2)
BENZODIAZ UR QL: NEGATIVE
BILIRUB SERPL-MCNC: 0.7 MG/DL (ref 0–1.2)
BUN SERPL-MCNC: 9 MG/DL (ref 6–20)
BUPRENORPHINE UR QL: NEGATIVE
CALCIUM SERPL-MCNC: 8.8 MG/DL (ref 8.6–10.2)
CANNABINOIDS UR QL SCN: NEGATIVE
CHLORIDE SERPL-SCNC: 101 MMOL/L (ref 98–107)
CO2 SERPL-SCNC: 22 MMOL/L (ref 22–29)
COCAINE UR QL SCN: NEGATIVE
CREAT SERPL-MCNC: 0.7 MG/DL (ref 0.5–1)
EOSINOPHIL # BLD: 0.04 K/UL (ref 0.05–0.5)
EOSINOPHILS RELATIVE PERCENT: 1 % (ref 0–6)
ERYTHROCYTE [DISTWIDTH] IN BLOOD BY AUTOMATED COUNT: 14.9 % (ref 11.5–15)
FENTANYL UR QL: NEGATIVE
GFR, ESTIMATED: >90 ML/MIN/1.73M2
GLUCOSE SERPL-MCNC: 99 MG/DL (ref 74–99)
HCG, URINE, POC: NEGATIVE
HCT VFR BLD AUTO: 44.3 % (ref 34–48)
HGB BLD-MCNC: 16.3 G/DL (ref 11.5–15.5)
IMM GRANULOCYTES # BLD AUTO: 0.03 K/UL (ref 0–0.58)
IMM GRANULOCYTES NFR BLD: 0 % (ref 0–5)
LYMPHOCYTES NFR BLD: 2.13 K/UL (ref 1.5–4)
LYMPHOCYTES RELATIVE PERCENT: 28 % (ref 20–42)
Lab: NORMAL
MCH RBC QN AUTO: 36 PG (ref 26–35)
MCHC RBC AUTO-ENTMCNC: 36.8 G/DL (ref 32–34.5)
MCV RBC AUTO: 97.8 FL (ref 80–99.9)
METHADONE UR QL: NEGATIVE
MONOCYTES NFR BLD: 0.9 K/UL (ref 0.1–0.95)
MONOCYTES NFR BLD: 12 % (ref 2–12)
NEGATIVE QC PASS/FAIL: NORMAL
NEUTROPHILS NFR BLD: 58 % (ref 43–80)
NEUTS SEG NFR BLD: 4.39 K/UL (ref 1.8–7.3)
OPIATES UR QL SCN: NEGATIVE
OXYCODONE UR QL SCN: NEGATIVE
PCP UR QL SCN: NEGATIVE
PLATELET # BLD AUTO: 211 K/UL (ref 130–450)
PMV BLD AUTO: 9.3 FL (ref 7–12)
POSITIVE QC PASS/FAIL: NORMAL
POTASSIUM SERPL-SCNC: 3.6 MMOL/L (ref 3.5–5)
PROT SERPL-MCNC: 7.2 G/DL (ref 6.4–8.3)
RBC # BLD AUTO: 4.53 M/UL (ref 3.5–5.5)
SODIUM SERPL-SCNC: 141 MMOL/L (ref 132–146)
TEST INFORMATION: NORMAL
WBC OTHER # BLD: 7.5 K/UL (ref 4.5–11.5)

## 2025-06-09 PROCEDURE — 80307 DRUG TEST PRSMV CHEM ANLYZR: CPT

## 2025-06-09 PROCEDURE — 96361 HYDRATE IV INFUSION ADD-ON: CPT

## 2025-06-09 PROCEDURE — 6360000002 HC RX W HCPCS

## 2025-06-09 PROCEDURE — 2580000003 HC RX 258

## 2025-06-09 PROCEDURE — 80143 DRUG ASSAY ACETAMINOPHEN: CPT

## 2025-06-09 PROCEDURE — 6370000000 HC RX 637 (ALT 250 FOR IP)

## 2025-06-09 PROCEDURE — 80053 COMPREHEN METABOLIC PANEL: CPT

## 2025-06-09 PROCEDURE — 85025 COMPLETE CBC W/AUTO DIFF WBC: CPT

## 2025-06-09 PROCEDURE — 99285 EMERGENCY DEPT VISIT HI MDM: CPT

## 2025-06-09 PROCEDURE — 80179 DRUG ASSAY SALICYLATE: CPT

## 2025-06-09 PROCEDURE — G0480 DRUG TEST DEF 1-7 CLASSES: HCPCS

## 2025-06-09 PROCEDURE — 96374 THER/PROPH/DIAG INJ IV PUSH: CPT

## 2025-06-09 RX ORDER — THIAMINE HYDROCHLORIDE 100 MG/ML
100 INJECTION, SOLUTION INTRAMUSCULAR; INTRAVENOUS ONCE
Status: COMPLETED | OUTPATIENT
Start: 2025-06-09 | End: 2025-06-09

## 2025-06-09 RX ORDER — 0.9 % SODIUM CHLORIDE 0.9 %
1000 INTRAVENOUS SOLUTION INTRAVENOUS ONCE
Status: COMPLETED | OUTPATIENT
Start: 2025-06-09 | End: 2025-06-10

## 2025-06-09 RX ORDER — FOLIC ACID 1 MG/1
1 TABLET ORAL DAILY
Status: DISCONTINUED | OUTPATIENT
Start: 2025-06-09 | End: 2025-06-11 | Stop reason: HOSPADM

## 2025-06-09 RX ADMIN — SODIUM CHLORIDE 1000 ML: 9 INJECTION, SOLUTION INTRAVENOUS at 23:11

## 2025-06-09 RX ADMIN — THIAMINE HYDROCHLORIDE 100 MG: 100 INJECTION, SOLUTION INTRAMUSCULAR; INTRAVENOUS at 23:15

## 2025-06-09 RX ADMIN — FOLIC ACID 1 MG: 1 TABLET ORAL at 23:15

## 2025-06-09 ASSESSMENT — PAIN - FUNCTIONAL ASSESSMENT: PAIN_FUNCTIONAL_ASSESSMENT: NONE - DENIES PAIN

## 2025-06-09 ASSESSMENT — LIFESTYLE VARIABLES
HOW OFTEN DO YOU HAVE A DRINK CONTAINING ALCOHOL: 4 OR MORE TIMES A WEEK
HOW MANY STANDARD DRINKS CONTAINING ALCOHOL DO YOU HAVE ON A TYPICAL DAY: 10 OR MORE

## 2025-06-10 PROBLEM — F10.930 ALCOHOL WITHDRAWAL SYNDROME WITHOUT COMPLICATION (HCC): Status: ACTIVE | Noted: 2025-06-10

## 2025-06-10 LAB
APAP SERPL-MCNC: <5 UG/ML (ref 10–30)
ETHANOLAMINE SERPL-MCNC: 365 MG/DL (ref 0–0.08)
SALICYLATES SERPL-MCNC: <0.3 MG/DL (ref 0–30)
TOXIC TRICYCLIC SC,BLOOD: NEGATIVE

## 2025-06-10 PROCEDURE — 2580000003 HC RX 258

## 2025-06-10 PROCEDURE — 2500000003 HC RX 250 WO HCPCS

## 2025-06-10 PROCEDURE — G0378 HOSPITAL OBSERVATION PER HR: HCPCS

## 2025-06-10 PROCEDURE — 96361 HYDRATE IV INFUSION ADD-ON: CPT

## 2025-06-10 PROCEDURE — 6370000000 HC RX 637 (ALT 250 FOR IP)

## 2025-06-10 PROCEDURE — 6360000002 HC RX W HCPCS

## 2025-06-10 PROCEDURE — 96372 THER/PROPH/DIAG INJ SC/IM: CPT

## 2025-06-10 PROCEDURE — 96375 TX/PRO/DX INJ NEW DRUG ADDON: CPT

## 2025-06-10 RX ORDER — SODIUM CHLORIDE 0.9 % (FLUSH) 0.9 %
5-40 SYRINGE (ML) INJECTION PRN
Status: DISCONTINUED | OUTPATIENT
Start: 2025-06-10 | End: 2025-06-11 | Stop reason: HOSPADM

## 2025-06-10 RX ORDER — SODIUM CHLORIDE 9 MG/ML
INJECTION, SOLUTION INTRAVENOUS PRN
Status: DISCONTINUED | OUTPATIENT
Start: 2025-06-10 | End: 2025-06-11 | Stop reason: HOSPADM

## 2025-06-10 RX ORDER — ALPRAZOLAM 1 MG/1
1 TABLET ORAL NIGHTLY PRN
Status: DISCONTINUED | OUTPATIENT
Start: 2025-06-10 | End: 2025-06-11 | Stop reason: HOSPADM

## 2025-06-10 RX ORDER — ACETAMINOPHEN 325 MG/1
650 TABLET ORAL EVERY 6 HOURS PRN
Status: DISCONTINUED | OUTPATIENT
Start: 2025-06-10 | End: 2025-06-11 | Stop reason: HOSPADM

## 2025-06-10 RX ORDER — MONTELUKAST SODIUM 10 MG/1
10 TABLET ORAL NIGHTLY
Status: DISCONTINUED | OUTPATIENT
Start: 2025-06-10 | End: 2025-06-11 | Stop reason: HOSPADM

## 2025-06-10 RX ORDER — POTASSIUM CHLORIDE 1500 MG/1
40 TABLET, EXTENDED RELEASE ORAL PRN
Status: DISCONTINUED | OUTPATIENT
Start: 2025-06-10 | End: 2025-06-11 | Stop reason: HOSPADM

## 2025-06-10 RX ORDER — METOPROLOL TARTRATE 50 MG
100 TABLET ORAL 2 TIMES DAILY
Status: DISCONTINUED | OUTPATIENT
Start: 2025-06-10 | End: 2025-06-11 | Stop reason: HOSPADM

## 2025-06-10 RX ORDER — ALPRAZOLAM 1 MG/1
1 TABLET ORAL 2 TIMES DAILY PRN
Status: DISCONTINUED | OUTPATIENT
Start: 2025-06-10 | End: 2025-06-11 | Stop reason: HOSPADM

## 2025-06-10 RX ORDER — PROCHLORPERAZINE EDISYLATE 5 MG/ML
10 INJECTION INTRAMUSCULAR; INTRAVENOUS ONCE
Status: COMPLETED | OUTPATIENT
Start: 2025-06-10 | End: 2025-06-10

## 2025-06-10 RX ORDER — PHENOBARBITAL 32.4 MG/1
32.4 TABLET ORAL 4 TIMES DAILY
Status: COMPLETED | OUTPATIENT
Start: 2025-06-10 | End: 2025-06-10

## 2025-06-10 RX ORDER — PHENOBARBITAL 32.4 MG/1
32.4 TABLET ORAL EVERY 6 HOURS PRN
Status: DISCONTINUED | OUTPATIENT
Start: 2025-06-10 | End: 2025-06-11 | Stop reason: HOSPADM

## 2025-06-10 RX ORDER — ONDANSETRON 4 MG/1
4 TABLET, ORALLY DISINTEGRATING ORAL EVERY 8 HOURS PRN
Status: DISCONTINUED | OUTPATIENT
Start: 2025-06-10 | End: 2025-06-11 | Stop reason: HOSPADM

## 2025-06-10 RX ORDER — PHENOBARBITAL 32.4 MG/1
32.4 TABLET ORAL 2 TIMES DAILY
Status: DISCONTINUED | OUTPATIENT
Start: 2025-06-11 | End: 2025-06-11 | Stop reason: HOSPADM

## 2025-06-10 RX ORDER — MULTIVITAMIN WITH IRON
1 TABLET ORAL DAILY
Status: DISCONTINUED | OUTPATIENT
Start: 2025-06-10 | End: 2025-06-11 | Stop reason: HOSPADM

## 2025-06-10 RX ORDER — POLYETHYLENE GLYCOL 3350 17 G/17G
17 POWDER, FOR SOLUTION ORAL DAILY PRN
Status: DISCONTINUED | OUTPATIENT
Start: 2025-06-10 | End: 2025-06-11 | Stop reason: HOSPADM

## 2025-06-10 RX ORDER — POTASSIUM CHLORIDE 7.45 MG/ML
10 INJECTION INTRAVENOUS PRN
Status: DISCONTINUED | OUTPATIENT
Start: 2025-06-10 | End: 2025-06-11 | Stop reason: HOSPADM

## 2025-06-10 RX ORDER — ENOXAPARIN SODIUM 100 MG/ML
30 INJECTION SUBCUTANEOUS 2 TIMES DAILY
Status: DISCONTINUED | OUTPATIENT
Start: 2025-06-10 | End: 2025-06-11 | Stop reason: HOSPADM

## 2025-06-10 RX ORDER — ONDANSETRON 2 MG/ML
4 INJECTION INTRAMUSCULAR; INTRAVENOUS EVERY 6 HOURS PRN
Status: DISCONTINUED | OUTPATIENT
Start: 2025-06-10 | End: 2025-06-11 | Stop reason: HOSPADM

## 2025-06-10 RX ORDER — MAGNESIUM SULFATE IN WATER 40 MG/ML
2000 INJECTION, SOLUTION INTRAVENOUS PRN
Status: DISCONTINUED | OUTPATIENT
Start: 2025-06-10 | End: 2025-06-11 | Stop reason: HOSPADM

## 2025-06-10 RX ORDER — ERGOCALCIFEROL 1.25 MG/1
50000 CAPSULE, LIQUID FILLED ORAL WEEKLY
Status: DISCONTINUED | OUTPATIENT
Start: 2025-06-10 | End: 2025-06-11 | Stop reason: HOSPADM

## 2025-06-10 RX ORDER — LANOLIN ALCOHOL/MO/W.PET/CERES
100 CREAM (GRAM) TOPICAL DAILY
Status: DISCONTINUED | OUTPATIENT
Start: 2025-06-10 | End: 2025-06-11 | Stop reason: HOSPADM

## 2025-06-10 RX ORDER — PHENOBARBITAL 32.4 MG/1
16.2 TABLET ORAL 2 TIMES DAILY
Status: DISCONTINUED | OUTPATIENT
Start: 2025-06-12 | End: 2025-06-11 | Stop reason: HOSPADM

## 2025-06-10 RX ORDER — SODIUM CHLORIDE 9 MG/ML
INJECTION, SOLUTION INTRAVENOUS CONTINUOUS
Status: DISCONTINUED | OUTPATIENT
Start: 2025-06-10 | End: 2025-06-10

## 2025-06-10 RX ORDER — PHENOBARBITAL 32.4 MG/1
16.2 TABLET ORAL EVERY 6 HOURS PRN
Status: DISCONTINUED | OUTPATIENT
Start: 2025-06-12 | End: 2025-06-11 | Stop reason: HOSPADM

## 2025-06-10 RX ORDER — ACETAMINOPHEN 650 MG/1
650 SUPPOSITORY RECTAL EVERY 6 HOURS PRN
Status: DISCONTINUED | OUTPATIENT
Start: 2025-06-10 | End: 2025-06-11 | Stop reason: HOSPADM

## 2025-06-10 RX ORDER — PANTOPRAZOLE SODIUM 40 MG/1
40 TABLET, DELAYED RELEASE ORAL
Status: DISCONTINUED | OUTPATIENT
Start: 2025-06-10 | End: 2025-06-11 | Stop reason: HOSPADM

## 2025-06-10 RX ORDER — SODIUM CHLORIDE 0.9 % (FLUSH) 0.9 %
5-40 SYRINGE (ML) INJECTION EVERY 12 HOURS SCHEDULED
Status: DISCONTINUED | OUTPATIENT
Start: 2025-06-10 | End: 2025-06-11 | Stop reason: HOSPADM

## 2025-06-10 RX ADMIN — PHENOBARBITAL 32.4 MG: 32.4 TABLET ORAL at 12:58

## 2025-06-10 RX ADMIN — PROCHLORPERAZINE EDISYLATE 10 MG: 5 INJECTION INTRAMUSCULAR; INTRAVENOUS at 02:12

## 2025-06-10 RX ADMIN — ALPRAZOLAM 1 MG: 1 TABLET ORAL at 22:15

## 2025-06-10 RX ADMIN — PHENOBARBITAL 32.4 MG: 32.4 TABLET ORAL at 08:43

## 2025-06-10 RX ADMIN — FOLIC ACID 1 MG: 1 TABLET ORAL at 08:42

## 2025-06-10 RX ADMIN — PHENOBARBITAL 32.4 MG: 32.4 TABLET ORAL at 17:01

## 2025-06-10 RX ADMIN — ERGOCALCIFEROL 50000 UNITS: 1.25 CAPSULE ORAL at 08:56

## 2025-06-10 RX ADMIN — SODIUM CHLORIDE, PRESERVATIVE FREE 10 ML: 5 INJECTION INTRAVENOUS at 20:31

## 2025-06-10 RX ADMIN — ENOXAPARIN SODIUM 30 MG: 100 INJECTION SUBCUTANEOUS at 20:31

## 2025-06-10 RX ADMIN — ENOXAPARIN SODIUM 30 MG: 100 INJECTION SUBCUTANEOUS at 08:41

## 2025-06-10 RX ADMIN — MULTIVITAMIN TABLET 1 TABLET: TABLET at 08:42

## 2025-06-10 RX ADMIN — METOPROLOL TARTRATE 100 MG: 50 TABLET, FILM COATED ORAL at 08:42

## 2025-06-10 RX ADMIN — METOPROLOL TARTRATE 100 MG: 50 TABLET, FILM COATED ORAL at 20:32

## 2025-06-10 RX ADMIN — SODIUM CHLORIDE: 0.9 INJECTION, SOLUTION INTRAVENOUS at 03:35

## 2025-06-10 RX ADMIN — PHENOBARBITAL 32.4 MG: 32.4 TABLET ORAL at 20:33

## 2025-06-10 RX ADMIN — Medication 100 MG: at 08:43

## 2025-06-10 ASSESSMENT — ENCOUNTER SYMPTOMS
SHORTNESS OF BREATH: 0
EYES NEGATIVE: 1
DIARRHEA: 0
COUGH: 0
ABDOMINAL PAIN: 0
VOMITING: 0

## 2025-06-10 NOTE — PLAN OF CARE
Problem: Discharge Planning  Goal: Discharge to home or other facility with appropriate resources  Outcome: Progressing     Problem: Safety - Adult  Goal: Free from fall injury  6/10/2025 1116 by Neetu Freeman RN  Outcome: Progressing  6/10/2025 0316 by Sravani Best, RN  Outcome: Progressing     Problem: Skin/Tissue Integrity - Adult  Goal: Skin integrity remains intact  Reactivated     Problem: Skin/Tissue Integrity - Adult  Goal: Skin integrity remains intact  Reactivated

## 2025-06-10 NOTE — PROGRESS NOTES
4 Eyes Skin Assessment     NAME:  Katie Camara  YOB: 1980  MEDICAL RECORD NUMBER:  [id number]    The patient is being assessed for  Admission    I agree that at least one RN has performed a thorough Head to Toe Skin Assessment on the patient. ALL assessment sites listed below have been assessed.      Areas assessed by both nurses:    Head, Face, Ears, Shoulders, Back, Chest, Arms, Elbows, Hands, Sacrum. Buttock, Coccyx, Ischium, and Legs. Feet and Heels        Does the Patient have a Wound? Yes wound(s) were present on assessment. LDA wound assessment was Initiated and completed by RN       Keith Prevention initiated by RN: No  Wound Care Orders initiated by RN: No    Pressure Injury (Stage 3,4, Unstageable, DTI, NWPT, and Complex wounds) if present, place Wound referral order by RN under : No    New Ostomies, if present place, Ostomy referral order under : No     Nurse 1 eSignature: Electronically signed by Sravani Best RN on 6/10/25 at 3:42 AM EDT    **SHARE this note so that the co-signing nurse can place an eSignature**    Nurse 2 eSignature: Electronically signed by Sarah Holguin on 6/10/25 at 3:43 AM EDT

## 2025-06-10 NOTE — CARE COORDINATION
Chart reviewed for transition of care at discharge. Admitted with alcohol withdrawal syndrome. Presented to ER for vaginal bleeding and alcohol withdrawal. Concerns for miscarriage. Patient states she was detox from alcohol. States that she drinks vodka daily. Currently on a CIWA protocol with phenobarb. She stated her son Ned lives with her in a 2 story home, with her bed on the first floor and bath on the first floor. She stated that she was independent, and uses no AD. Her PCP is Dr Sesay and her pharmacy is [facility] [address] Offered Peer Recovery services, and she declined. She stated that she goes to counseling at [facility], and is also planning to get her Vivitrol shots again. She declines any other assistance. Her d/c plan is home when medically stable, and son Ned will transfer. CM will follow.  Electronically signed by Morgan Quintanilla RN on 6/10/2025 at 12:23 PM

## 2025-06-10 NOTE — H&P
facility.  Possible d/c in 1-2 days.  Unsure if d/c on phenobarb, but more likely d/c on librium taper if patient is actually going to quit drinking.  Cont thiamine.    Smoker - nicotine patch if desired.  HTN  -BP stable.   Cont BB      Code- full  DVT prophylaxis - lovenox  PT OT  Discharge plan - home.        Willem Norris MD  6/10/2025  4:22 PM

## 2025-06-10 NOTE — ED PROVIDER NOTES
MEDICATIONS:  New Prescriptions    No medications on file            (Please note that portions of this note were completed with a voice recognition program.  Efforts were made to edit the dictations but occasionally words are mis-transcribed.)    Josefina Patel,  (electronically signed)

## 2025-06-10 NOTE — ED NOTES
ED to Inpatient Handoff Report    Notified [facility] that electronic handoff available and patient ready for transport to room 542.    Safety Risks: None identified    Patient in Restraints: no    Constant Observer or Patient : no    Telemetry Monitoring Ordered: No          Order to transfer to unit without monitor: NA    Last MEWS:  Time completed:     Deterioration Index: 15.38    Vitals:    06/09/25 2256 06/09/25 2257 06/10/25 0126   BP: 108/79 108/79 116/84   Pulse: 80 80 89   Resp: 16  17   Temp: 97.8 °F (36.6 °C)     TempSrc: Oral     SpO2: 97%  96%   Weight: 113.4 kg (250 lb)     Height: 1.727 m (5' 8\")         Opportunity for questions and clarification was provided.

## 2025-06-11 VITALS
OXYGEN SATURATION: 100 % | DIASTOLIC BLOOD PRESSURE: 93 MMHG | BODY MASS INDEX: 41.22 KG/M2 | WEIGHT: 272 LBS | HEIGHT: 68 IN | RESPIRATION RATE: 16 BRPM | TEMPERATURE: 97.9 F | HEART RATE: 77 BPM | SYSTOLIC BLOOD PRESSURE: 143 MMHG

## 2025-06-11 LAB
ANION GAP SERPL CALCULATED.3IONS-SCNC: 17 MMOL/L (ref 7–16)
BUN SERPL-MCNC: 6 MG/DL (ref 6–20)
CALCIUM SERPL-MCNC: 8.8 MG/DL (ref 8.6–10.2)
CHLORIDE SERPL-SCNC: 103 MMOL/L (ref 98–107)
CO2 SERPL-SCNC: 23 MMOL/L (ref 22–29)
CREAT SERPL-MCNC: 0.6 MG/DL (ref 0.5–1)
GFR, ESTIMATED: >90 ML/MIN/1.73M2
GLUCOSE SERPL-MCNC: 83 MG/DL (ref 74–99)
MAGNESIUM SERPL-MCNC: 1.9 MG/DL (ref 1.6–2.6)
POTASSIUM SERPL-SCNC: 3.3 MMOL/L (ref 3.5–5)
SODIUM SERPL-SCNC: 143 MMOL/L (ref 132–146)

## 2025-06-11 PROCEDURE — 6370000000 HC RX 637 (ALT 250 FOR IP)

## 2025-06-11 PROCEDURE — 36415 COLL VENOUS BLD VENIPUNCTURE: CPT

## 2025-06-11 PROCEDURE — 80048 BASIC METABOLIC PNL TOTAL CA: CPT

## 2025-06-11 PROCEDURE — 2500000003 HC RX 250 WO HCPCS

## 2025-06-11 PROCEDURE — 83735 ASSAY OF MAGNESIUM: CPT

## 2025-06-11 PROCEDURE — 96372 THER/PROPH/DIAG INJ SC/IM: CPT

## 2025-06-11 PROCEDURE — G0378 HOSPITAL OBSERVATION PER HR: HCPCS

## 2025-06-11 PROCEDURE — 6360000002 HC RX W HCPCS

## 2025-06-11 RX ORDER — FOLIC ACID 1 MG/1
1 TABLET ORAL DAILY
Qty: 30 TABLET | Refills: 0 | Status: SHIPPED | OUTPATIENT
Start: 2025-06-12

## 2025-06-11 RX ORDER — MULTIVITAMIN WITH IRON
1 TABLET ORAL DAILY
Qty: 30 TABLET | Refills: 0 | Status: SHIPPED | OUTPATIENT
Start: 2025-06-12

## 2025-06-11 RX ORDER — LANOLIN ALCOHOL/MO/W.PET/CERES
100 CREAM (GRAM) TOPICAL DAILY
Qty: 30 TABLET | Refills: 0 | Status: SHIPPED | OUTPATIENT
Start: 2025-06-12

## 2025-06-11 RX ADMIN — PANTOPRAZOLE SODIUM 40 MG: 40 TABLET, DELAYED RELEASE ORAL at 06:25

## 2025-06-11 RX ADMIN — FOLIC ACID 1 MG: 1 TABLET ORAL at 09:50

## 2025-06-11 RX ADMIN — ENOXAPARIN SODIUM 30 MG: 100 INJECTION SUBCUTANEOUS at 09:51

## 2025-06-11 RX ADMIN — SODIUM CHLORIDE, PRESERVATIVE FREE 10 ML: 5 INJECTION INTRAVENOUS at 09:53

## 2025-06-11 RX ADMIN — Medication 100 MG: at 09:51

## 2025-06-11 RX ADMIN — MULTIVITAMIN TABLET 1 TABLET: TABLET at 09:51

## 2025-06-11 RX ADMIN — METOPROLOL TARTRATE 100 MG: 50 TABLET, FILM COATED ORAL at 09:50

## 2025-06-11 NOTE — PLAN OF CARE
Problem: Discharge Planning  Goal: Discharge to home or other facility with appropriate resources  Outcome: Progressing     Problem: Safety - Adult  Goal: Free from fall injury  Outcome: Progressing     Problem: Skin/Tissue Integrity - Adult  Goal: Skin integrity remains intact  Outcome: Progressing     Problem: Seizure Precautions  Goal: Remains free of injury related to seizures activity  Outcome: Progressing

## 2025-06-11 NOTE — PROGRESS NOTES
Spiritual Health History and Assessment/Progress Note  [facility]    Loneliness/Social Isolation,  ,  ,      Name: Katie Camara MRN: [id number]    Age: 44 y.o.     Sex: female   Language: English   Spiritism: Presbyterian   Alcohol withdrawal syndrome without complication (HCC)     Date: 6/10/2025                           Spiritual Assessment began in [facility] MED SURG/TELE        Referral/Consult From: Nurse   Encounter Overview/Reason: Loneliness/Social Isolation  Service Provided For: Patient    Tiffany, Belief, Meaning:   Patient has beliefs or practices that help with coping during difficult times  Family/Friends No family/friends present      Importance and Influence:  Patient has no beliefs influential to healthcare decision-making identified during this visit  Family/Friends No family/friends present    Community:  Patient is connected with a spiritual community  Family/Friends No family/friends present    Assessment and Plan of Care:     Patient Interventions include: Facilitated expression of thoughts and feelings and Provided sacramental/Christian ritual  Family/Friends Interventions include: No family/friends present    Patient Plan of Care: No spiritual needs identified for follow-up  Family/Friends Plan of Care: No family/friends present    Electronically signed by Chaplain Avelino on 6/10/2025 at 8:00 PM

## 2025-06-11 NOTE — DISCHARGE SUMMARY
ST - P [phone] - F [phone]  [address] [facility] [address]      Phone: [phone]   folic acid 1 MG tablet  multivitamin Tabs tablet  thiamine 100 MG tablet       Activity: {discharge activity:49882}  Diet: {diet:48745}    Pt has been advised to:    Follow-up with Yaw Sesay DO in 1 week.  Follow-up with consultants as recommended by them      Signed:  KORY Bearden CNP  6/11/2025  11:38 AM

## (undated) DEVICE — SHIELD EYE W3XL2.5IN UNIV CLR PLAS LTWT

## (undated) DEVICE — ELECTRODE PT RET AD L9FT HI MOIST COND ADH HYDRGEL CORDED

## (undated) DEVICE — LUBRICANT SURG JELLY ST BACTER TUBE 4.25OZ

## (undated) DEVICE — TOWEL,OR,DSP,ST,BLUE,STD,6/PK,12PK/CS: Brand: MEDLINE

## (undated) DEVICE — MAGNETIC INSTR DRAPE 20X16: Brand: MEDLINE INDUSTRIES, INC.

## (undated) DEVICE — GLOVE ORANGE PI 8 1/2   MSG9085

## (undated) DEVICE — Device

## (undated) DEVICE — TUBING SUCT 12FR MAL ALUM SHFT FN CAP VENT UNIV CONN W/ OBT

## (undated) DEVICE — SOLUTION IRRIG 1000ML 0.9% SOD CHL USP POUR PLAS BTL

## (undated) DEVICE — ADHESIVE LIQ MED 2/3 CC VI DEV REL INJ LO RISK MASTISOL

## (undated) DEVICE — Device: Brand: DEFENDO VALVE AND CONNECTOR KIT

## (undated) DEVICE — BASIC SINGLE BASIN 1-LF: Brand: MEDLINE INDUSTRIES, INC.

## (undated) DEVICE — SPONGE GZ 4IN 4IN 4 PLY N WVN AVANT

## (undated) DEVICE — MARKER,SKIN,WI/RULER AND LABELS: Brand: MEDLINE

## (undated) DEVICE — GAUZE,SPONGE,4"X4",8PLY,STRL,LF,10/TRAY: Brand: MEDLINE

## (undated) DEVICE — BLADE, TONGUE, 6", STERILE: Brand: MEDLINE

## (undated) DEVICE — SYRINGE MED 10ML TRNSLUC BRL PLUNG BLK MRK POLYPR CTRL

## (undated) DEVICE — MASK,FACE,MAXFLUIDPROTECT,SHIELD/ERLPS: Brand: MEDLINE

## (undated) DEVICE — MEDI-VAC NON-CONDUCTIVE SUCTION TUBING: Brand: CARDINAL HEALTH

## (undated) DEVICE — SPONGE,NEURO,0.5"X3",XR,STRL,LF,10/PK: Brand: MEDLINE

## (undated) DEVICE — STRIP,CLOSURE,WOUND,MEDI-STRIP,1/2X4: Brand: MEDLINE

## (undated) DEVICE — BLOCK BITE 60FR CAREGUARD

## (undated) DEVICE — 6 X 9  1.75MIL 4-WALL LABGUARD: Brand: MINIGRIP COMMERCIAL LLC

## (undated) DEVICE — CANNULA IRRIGATION DOME 45 DEG 27 GAX7/8 IN SMOOTH RYCROFT

## (undated) DEVICE — BATTERY PACK FOR VARISPEED: Brand: STRYKER VARISPEED

## (undated) DEVICE — TAPE ADH W1INXL10YD WHT PAPR GENTLE BRTH FLX COMFORTABLE

## (undated) DEVICE — MEDI-VAC YANKAUER SUCTION HANDLE W/BULBOUS TIP: Brand: CARDINAL HEALTH

## (undated) DEVICE — 4-PORT MANIFOLD: Brand: NEPTUNE 2

## (undated) DEVICE — CROUCH CORNEAL PROTECTOR: Brand: BAUSCH + LOMB

## (undated) DEVICE — CONTAINER SPEC COLL 960ML POLYPR TRIANG GRAD INTAKE/OUTPUT

## (undated) DEVICE — SOLUTION IRRIGATION BAL SALT SOLUTION 15 ML STRL BSS

## (undated) DEVICE — FORCEPS BX L240CM JAW DIA2.8MM L CAP W/ NDL MIC MESH TOOTH

## (undated) DEVICE — KENDALL 450 SERIES MONITORING FOAM ELECTRODE - RECTANGULAR SHAPE ( 3/PK): Brand: KENDALL

## (undated) DEVICE — BLADE,STAINLESS-STEEL,15,STRL,DISPOSABLE: Brand: MEDLINE

## (undated) DEVICE — GOWN ISOLATN REG YEL M WT MULTIPLY SIDETIE LEV 2

## (undated) DEVICE — MICRODISSECTION NEEDLE STRAIGHT SLEEVE: Brand: COLORADO

## (undated) DEVICE — KIT BEDSIDE REVITAL OX 500ML

## (undated) DEVICE — PAD,EYE,1-5/8X2 5/8,STERILE,LF,1/PK: Brand: MEDLINE